# Patient Record
Sex: MALE | Race: WHITE | Employment: OTHER | URBAN - METROPOLITAN AREA
[De-identification: names, ages, dates, MRNs, and addresses within clinical notes are randomized per-mention and may not be internally consistent; named-entity substitution may affect disease eponyms.]

---

## 2017-09-28 ENCOUNTER — HOSPITAL ENCOUNTER (EMERGENCY)
Age: 62
Discharge: HOME | End: 2017-09-28
Attending: EMERGENCY MEDICINE
Payer: COMMERCIAL

## 2017-09-28 ENCOUNTER — APPOINTMENT (OUTPATIENT)
Dept: GENERAL RADIOLOGY | Age: 62
End: 2017-09-28
Attending: NURSE PRACTITIONER
Payer: COMMERCIAL

## 2017-09-28 VITALS
HEART RATE: 95 BPM | RESPIRATION RATE: 26 BRPM | TEMPERATURE: 98.9 F | WEIGHT: 218 LBS | SYSTOLIC BLOOD PRESSURE: 98 MMHG | HEIGHT: 68 IN | OXYGEN SATURATION: 95 % | DIASTOLIC BLOOD PRESSURE: 53 MMHG | BODY MASS INDEX: 33.04 KG/M2

## 2017-09-28 DIAGNOSIS — B34.9 VIRAL SYNDROME: Primary | ICD-10-CM

## 2017-09-28 DIAGNOSIS — M79.10 MYALGIA: ICD-10-CM

## 2017-09-28 LAB
ALBUMIN SERPL-MCNC: 4 G/DL (ref 3.2–4.6)
ALBUMIN/GLOB SERPL: 0.9 {RATIO} (ref 1.2–3.5)
ALP SERPL-CCNC: 92 U/L (ref 50–136)
ALT SERPL-CCNC: 22 U/L (ref 12–65)
ANION GAP SERPL CALC-SCNC: 8 MMOL/L (ref 7–16)
AST SERPL-CCNC: 24 U/L (ref 15–37)
ATRIAL RATE: 101 BPM
BACTERIA URNS QL MICRO: 0 /HPF
BASOPHILS # BLD: 0 K/UL (ref 0–0.2)
BASOPHILS NFR BLD: 0 % (ref 0–2)
BILIRUB SERPL-MCNC: 0.7 MG/DL (ref 0.2–1.1)
BUN SERPL-MCNC: 15 MG/DL (ref 8–23)
CALCIUM SERPL-MCNC: 9.5 MG/DL (ref 8.3–10.4)
CALCULATED P AXIS, ECG09: 47 DEGREES
CALCULATED R AXIS, ECG10: 54 DEGREES
CALCULATED T AXIS, ECG11: 41 DEGREES
CASTS URNS QL MICRO: 0 /LPF
CHLORIDE SERPL-SCNC: 103 MMOL/L (ref 98–107)
CO2 SERPL-SCNC: 29 MMOL/L (ref 21–32)
CREAT SERPL-MCNC: 1.14 MG/DL (ref 0.8–1.5)
DIAGNOSIS, 93000: NORMAL
DIFFERENTIAL METHOD BLD: ABNORMAL
EOSINOPHIL # BLD: 0.5 K/UL (ref 0–0.8)
EOSINOPHIL NFR BLD: 2 % (ref 0.5–7.8)
EPI CELLS #/AREA URNS HPF: NORMAL /HPF
ERYTHROCYTE [DISTWIDTH] IN BLOOD BY AUTOMATED COUNT: 14.2 % (ref 11.9–14.6)
FLUAV AG NPH QL IA: NEGATIVE
FLUBV AG NPH QL IA: NEGATIVE
GLOBULIN SER CALC-MCNC: 4.4 G/DL (ref 2.3–3.5)
GLUCOSE SERPL-MCNC: 100 MG/DL (ref 65–100)
HCT VFR BLD AUTO: 43.1 % (ref 41.1–50.3)
HGB BLD-MCNC: 14 G/DL (ref 13.6–17.2)
IMM GRANULOCYTES # BLD: 0 K/UL (ref 0–0.5)
IMM GRANULOCYTES NFR BLD: 0 % (ref 0–5)
LYMPHOCYTES # BLD: 1.9 K/UL (ref 0.5–4.6)
LYMPHOCYTES NFR BLD: 8 % (ref 13–44)
MCH RBC QN AUTO: 30.5 PG (ref 26.1–32.9)
MCHC RBC AUTO-ENTMCNC: 32.5 G/DL (ref 31.4–35)
MCV RBC AUTO: 93.9 FL (ref 79.6–97.8)
MONOCYTES # BLD: 0 K/UL (ref 0.1–1.3)
MONOCYTES NFR BLD: 0 % (ref 4–12)
NEUTS SEG # BLD: 21.6 K/UL (ref 1.7–8.2)
NEUTS SEG NFR BLD: 90 % (ref 43–78)
P-R INTERVAL, ECG05: 210 MS
PLATELET # BLD AUTO: 252 K/UL (ref 150–450)
PLATELET COMMENTS,PCOM: ADEQUATE
PMV BLD AUTO: 10.2 FL (ref 10.8–14.1)
POTASSIUM SERPL-SCNC: 4.4 MMOL/L (ref 3.5–5.1)
PROT SERPL-MCNC: 8.4 G/DL (ref 6.3–8.2)
Q-T INTERVAL, ECG07: 336 MS
QRS DURATION, ECG06: 76 MS
QTC CALCULATION (BEZET), ECG08: 435 MS
RBC # BLD AUTO: 4.59 M/UL (ref 4.23–5.67)
RBC #/AREA URNS HPF: 0 /HPF
RBC MORPH BLD: ABNORMAL
SODIUM SERPL-SCNC: 140 MMOL/L (ref 136–145)
TROPONIN I SERPL-MCNC: <0.02 NG/ML (ref 0.02–0.05)
VENTRICULAR RATE, ECG03: 101 BPM
WBC # BLD AUTO: 24 K/UL (ref 4.3–11.1)
WBC MORPH BLD: ABNORMAL
WBC URNS QL MICRO: NORMAL /HPF

## 2017-09-28 PROCEDURE — 81015 MICROSCOPIC EXAM OF URINE: CPT | Performed by: NURSE PRACTITIONER

## 2017-09-28 PROCEDURE — 99284 EMERGENCY DEPT VISIT MOD MDM: CPT | Performed by: EMERGENCY MEDICINE

## 2017-09-28 PROCEDURE — 71020 XR CHEST PA LAT: CPT

## 2017-09-28 PROCEDURE — 93005 ELECTROCARDIOGRAM TRACING: CPT | Performed by: NURSE PRACTITIONER

## 2017-09-28 PROCEDURE — 84484 ASSAY OF TROPONIN QUANT: CPT | Performed by: NURSE PRACTITIONER

## 2017-09-28 PROCEDURE — 81003 URINALYSIS AUTO W/O SCOPE: CPT | Performed by: EMERGENCY MEDICINE

## 2017-09-28 PROCEDURE — 85025 COMPLETE CBC W/AUTO DIFF WBC: CPT | Performed by: NURSE PRACTITIONER

## 2017-09-28 PROCEDURE — 87804 INFLUENZA ASSAY W/OPTIC: CPT | Performed by: EMERGENCY MEDICINE

## 2017-09-28 PROCEDURE — 80053 COMPREHEN METABOLIC PANEL: CPT | Performed by: NURSE PRACTITIONER

## 2017-09-28 NOTE — ED PROVIDER NOTES
HPI Comments: 58-year-old white male presents with generalized aching which involves his chest, back, arms onset this morning. Symptoms have resolved at this time. He does report that he has had intermittent chest pain for years. He states the pain usually feels like a muscle cramp. It is not specifically related to activity, eating, breathing or position. Currently he denies chest pain. No shortness of breath. This morning he did have some chills. He has had no measured fever. No nausea or vomiting. Patient is a 58 y.o. male presenting with chest pain. The history is provided by the patient. Chest Pain (Angina)    Associated symptoms include back pain. Pertinent negatives include no abdominal pain, no cough, no fever, no headaches, no nausea, no shortness of breath and no vomiting.         Past Medical History:   Diagnosis Date    Anxiety     takes alprazolam daily    Arthritis     Edema     BLE; takes spironolactone daily    GERD (gastroesophageal reflux disease)     managed w/med    Hypertension     managed w/med    Inguinal hernia     left per pt    PUD (peptic ulcer disease) 40 yrs ago    pt states no current issues    Status post left hip replacement 10/14/2015    [facility] 2002    pt states still limps w/right leg, has problems with speech & problems writing at times       Past Surgical History:   Procedure Laterality Date    HX ADENOIDECTOMY      HX APPENDECTOMY      HX CAROTID ENDARTERECTOMY Bilateral     HX ENDOSCOPY      EGD    HX GI      esophageal dilation    HX ORTHOPAEDIC Right     knee    HX ORTHOPAEDIC Left     thumb; bone removed    HX ORTHOPAEDIC Right     middle finger sx    HX TONSILLECTOMY      HX UROLOGICAL      testicular sx due to injury as a child         Family History:   Problem Relation Age of Onset    Breast Cancer Neg Hx        Social History     Social History    Marital status:      Spouse name: N/A    Number of children: N/A    Years of education: N/A     Occupational History    Not on file. Social History Main Topics    Smoking status: Former Smoker     Packs/day: 1.50     Years: 20.00    Smokeless tobacco: Never Used      Comment: quit smoking in 2002    Alcohol use No      Comment: pt was drinking wine in the evening but stopped last week due to surgery    Drug use: No    Sexual activity: Not on file     Other Topics Concern    Not on file     Social History Narrative         ALLERGIES: Other food    Review of Systems   Constitutional: Positive for chills. Negative for fever. HENT: Negative for congestion. Respiratory: Negative for cough and shortness of breath. Cardiovascular: Positive for chest pain. Gastrointestinal: Negative for abdominal pain, nausea and vomiting. Genitourinary: Negative for dysuria. Musculoskeletal: Positive for back pain and myalgias. Negative for neck pain. Skin: Negative for rash. Neurological: Negative for headaches. Vitals:    09/28/17 0948   BP: 143/52   Pulse: 93   Resp: 26   Temp: 99.3 °F (37.4 °C)   SpO2: 94%   Weight: 98.9 kg (218 lb)   Height: 5' 8\" (1.727 m)            Physical Exam   Constitutional: He is oriented to person, place, and time. He appears well-developed and well-nourished. No distress. HENT:   Head: Normocephalic and atraumatic. Mouth/Throat: Oropharynx is clear and moist.   Eyes: Conjunctivae are normal. Pupils are equal, round, and reactive to light. Neck: Normal range of motion. Neck supple. Cardiovascular: Normal rate and regular rhythm. No murmur heard. Pulmonary/Chest: Effort normal and breath sounds normal. He has no wheezes. He has no rales. He exhibits no tenderness. Abdominal: Soft. There is no tenderness. Musculoskeletal: Normal range of motion. He exhibits no edema. Neurological: He is alert and oriented to person, place, and time. Skin: Skin is warm and dry. Psychiatric: He has a normal mood and affect.    Nursing note and vitals reviewed. MDM  Number of Diagnoses or Management Options  Diagnosis management comments: EKG shows no acute ST changes. Chest x-ray shows no evidence of heart failure or pneumonia. Lab work is unremarkable except for mild leukocytosis 24,000. Urinalysis shows no infection. Reticulocyte is negative. He has a nonsurgical abdominal exam.  Etiology for his chills and leukocytosis is not clear but at this time it appears most likely to be a viral syndrome. No findings to suggest acute coronary syndrome on EKG. Clinically the patient appears very well and does not appear septic. I do not feel antibiotics or further workup or necessary at this time. He has been advised to use Motrin for myalgias and fever. Follow-up with primary care for recheck tomorrow and return if worsening.        Amount and/or Complexity of Data Reviewed  Clinical lab tests: ordered and reviewed  Tests in the radiology section of CPT®: ordered and reviewed  Independent visualization of images, tracings, or specimens: yes    Risk of Complications, Morbidity, and/or Mortality  Presenting problems: moderate  Diagnostic procedures: moderate  Management options: moderate      ED Course       Procedures

## 2017-09-28 NOTE — ED TRIAGE NOTES
Pt states chest pain that comes and goes for the past 14 years. Pt states the pain this morning was like a cramp. Denies any nausea or vomiting.

## 2017-09-28 NOTE — DISCHARGE INSTRUCTIONS
Muscle Aches: Care Instructions  Your Care Instructions  Muscle aches have many possible causes. Some common ones are overuse, tension, and injuries such as a strained muscle. An infection such as the flu can cause muscle aches. Or the aches may be caused by some medicines, such as statins. Muscle aches may also be a symptom of a disease like lupus or fibromyalgia. Myalgia is the medical term for muscle aches. The doctor will do a physical exam and ask questions to try to find what is causing your pain. You may also have tests such as blood tests or imaging tests like X-rays. These can help find or rule out serious problems. The doctor has checked you carefully, but problems can develop later. If you notice any problems or new symptoms, get medical treatment right away. Follow-up care is a key part of your treatment and safety. Be sure to make and go to all appointments, and call your doctor if you are having problems. It's also a good idea to know your test results and keep a list of the medicines you take. How can you care for yourself at home? · Rest the area that hurts. You may need to stop or reduce the activity that causes your symptoms. Then you can return to it slowly. · Put ice or a cold pack on the area for 10 to 20 minutes at a time to ease pain. Put a thin cloth between the ice and your skin. · Take an over-the-counter pain medicine, such as acetaminophen (Tylenol), ibuprofen (Advil, Motrin), or naproxen (Aleve). Be safe with medicines. Read and follow all instructions on the label. When should you call for help? Call your doctor now or seek immediate medical care if:  · Your pain gets worse. · You have new symptoms, such as a fever, swelling, or a rash. Watch closely for changes in your health, and be sure to contact your doctor if:  · You do not get better as expected. Where can you learn more? Go to http://bessy-mimi.info/.   Enter G355 in the search box to learn more about \"Muscle Aches: Care Instructions. \"  Current as of: October 14, 2016  Content Version: 11.3  © 4188-0877 [employer]. Care instructions adapted under license by [employer] (which disclaims liability or warranty for this information). If you have questions about a medical condition or this instruction, always ask your healthcare professional. [address] any warranty or liability for your use of this information. Viral Infections: Care Instructions  Your Care Instructions  You don't feel well, but it's not clear what's causing it. You may have a viral infection. Viruses cause many illnesses, such as the common cold, influenza, fever, rashes, and the diarrhea, nausea, and vomiting that are often called \"stomach flu. \" You may wonder if antibiotic medicines could make you feel better. But antibiotics only treat infections caused by bacteria. They don't work on viruses. The good news is that viral infections usually aren't serious. Most will go away in a few days without medical treatment. In the meantime, there are a few things you can do to make yourself more comfortable. Follow-up care is a key part of your treatment and safety. Be sure to make and go to all appointments, and call your doctor if you are having problems. It's also a good idea to know your test results and keep a list of the medicines you take. How can you care for yourself at home? · Get plenty of rest if you feel tired. · Take an over-the-counter pain medicine if needed, such as acetaminophen (Tylenol), ibuprofen (Advil, Motrin), or naproxen (Aleve). Read and follow all instructions on the label. · Be careful when taking over-the-counter cold or flu medicines and Tylenol at the same time. Many of these medicines have acetaminophen, which is Tylenol. Read the labels to make sure that you are not taking more than the recommended dose.  Too much acetaminophen (Tylenol) can be harmful. · Drink plenty of fluids, enough so that your urine is light yellow or clear like water. If you have kidney, heart, or liver disease and have to limit fluids, talk with your doctor before you increase the amount of fluids you drink. · Stay home from work, school, and other public places while you have a fever. When should you call for help? Call 911 anytime you think you may need emergency care. For example, call if:  · You have severe trouble breathing. · You passed out (lost consciousness). Call your doctor now or seek immediate medical care if:  · You seem to be getting much sicker. · You have a new or higher fever. · You have blood in your stools. · You have new belly pain, or your pain gets worse. · You have a new rash. Watch closely for changes in your health, and be sure to contact your doctor if:  · You start to get better and then get worse. · You do not get better as expected. Where can you learn more? Go to http://bessy-mimi.info/. Enter U062 in the search box to learn more about \"Viral Infections: Care Instructions. \"  Current as of: March 3, 2017  Content Version: 11.3  © 9892-7864 [employer]. Care instructions adapted under license by [employer] (which disclaims liability or warranty for this information). If you have questions about a medical condition or this instruction, always ask your healthcare professional. [place] [address] any warranty or liability for your use of this information.

## 2017-09-28 NOTE — ED TRIAGE NOTES
Micheal Maya is a 58 y.o. male here for chest pain that has been on and off for the past 14 years. Female visitor with patient states patient woke up this morning and had chills and was complaining of chest pain. He states minimal shortness of breath. He denies nausea and vomiting. He is alert and oriented at this time. He is in no acute distress. . Rapid assessment performed. --- Orders were placed. --- Patient will be placed in the waiting room.      Signed By: DEJON Vinson     September 28, 2017

## 2017-11-08 ENCOUNTER — HOSPITAL ENCOUNTER (EMERGENCY)
Age: 62
Discharge: HOME | End: 2017-11-08
Attending: STUDENT IN AN ORGANIZED HEALTH CARE EDUCATION/TRAINING PROGRAM
Payer: COMMERCIAL

## 2017-11-08 VITALS
OXYGEN SATURATION: 98 % | HEIGHT: 68 IN | WEIGHT: 223 LBS | HEART RATE: 69 BPM | BODY MASS INDEX: 33.8 KG/M2 | SYSTOLIC BLOOD PRESSURE: 113 MMHG | DIASTOLIC BLOOD PRESSURE: 56 MMHG | RESPIRATION RATE: 18 BRPM | TEMPERATURE: 98 F

## 2017-11-08 DIAGNOSIS — R19.7 DIARRHEA, UNSPECIFIED TYPE: Primary | ICD-10-CM

## 2017-11-08 LAB
ALBUMIN SERPL-MCNC: 3.6 G/DL (ref 3.2–4.6)
ALBUMIN/GLOB SERPL: 0.7 {RATIO} (ref 1.2–3.5)
ALP SERPL-CCNC: 89 U/L (ref 50–136)
ALT SERPL-CCNC: 24 U/L (ref 12–65)
ANION GAP SERPL CALC-SCNC: 10 MMOL/L (ref 7–16)
AST SERPL-CCNC: 38 U/L (ref 15–37)
BACTERIA SPEC CULT: NEGATIVE
BACTERIA SPEC CULT: NORMAL
BASOPHILS # BLD: 0 K/UL (ref 0–0.2)
BASOPHILS NFR BLD: 0 % (ref 0–2)
BILIRUB SERPL-MCNC: 0.6 MG/DL (ref 0.2–1.1)
BUN SERPL-MCNC: 25 MG/DL (ref 8–23)
CALCIUM SERPL-MCNC: 9.2 MG/DL (ref 8.3–10.4)
CHLORIDE SERPL-SCNC: 105 MMOL/L (ref 98–107)
CO2 SERPL-SCNC: 24 MMOL/L (ref 21–32)
CREAT SERPL-MCNC: 1.02 MG/DL (ref 0.8–1.5)
DIFFERENTIAL METHOD BLD: ABNORMAL
EOSINOPHIL # BLD: 0.3 K/UL (ref 0–0.8)
EOSINOPHIL NFR BLD: 2 % (ref 0.5–7.8)
ERYTHROCYTE [DISTWIDTH] IN BLOOD BY AUTOMATED COUNT: 15.6 % (ref 11.9–14.6)
GLOBULIN SER CALC-MCNC: 5.2 G/DL (ref 2.3–3.5)
GLUCOSE SERPL-MCNC: 94 MG/DL (ref 65–100)
HCT VFR BLD AUTO: 44.2 % (ref 41.1–50.3)
HGB BLD-MCNC: 14.4 G/DL (ref 13.6–17.2)
IMM GRANULOCYTES # BLD: 0 K/UL (ref 0–0.5)
IMM GRANULOCYTES NFR BLD: 0 % (ref 0–5)
LYMPHOCYTES # BLD: 1.6 K/UL (ref 0.5–4.6)
LYMPHOCYTES NFR BLD: 15 % (ref 13–44)
MAGNESIUM SERPL-MCNC: 2.1 MG/DL (ref 1.8–2.4)
MCH RBC QN AUTO: 29.3 PG (ref 26.1–32.9)
MCHC RBC AUTO-ENTMCNC: 32.6 G/DL (ref 31.4–35)
MCV RBC AUTO: 89.8 FL (ref 79.6–97.8)
MONOCYTES # BLD: 0.7 K/UL (ref 0.1–1.3)
MONOCYTES NFR BLD: 7 % (ref 4–12)
NEUTS SEG # BLD: 7.8 K/UL (ref 1.7–8.2)
NEUTS SEG NFR BLD: 76 % (ref 43–78)
PLATELET # BLD AUTO: 301 K/UL (ref 150–450)
PMV BLD AUTO: 9.5 FL (ref 10.8–14.1)
POTASSIUM SERPL-SCNC: 4.7 MMOL/L (ref 3.5–5.1)
PROT SERPL-MCNC: 8.8 G/DL (ref 6.3–8.2)
RBC # BLD AUTO: 4.92 M/UL (ref 4.23–5.67)
SERVICE CMNT-IMP: NORMAL
SODIUM SERPL-SCNC: 139 MMOL/L (ref 136–145)
WBC # BLD AUTO: 10.5 K/UL (ref 4.3–11.1)

## 2017-11-08 PROCEDURE — 96360 HYDRATION IV INFUSION INIT: CPT | Performed by: STUDENT IN AN ORGANIZED HEALTH CARE EDUCATION/TRAINING PROGRAM

## 2017-11-08 PROCEDURE — 81003 URINALYSIS AUTO W/O SCOPE: CPT | Performed by: STUDENT IN AN ORGANIZED HEALTH CARE EDUCATION/TRAINING PROGRAM

## 2017-11-08 PROCEDURE — 80053 COMPREHEN METABOLIC PANEL: CPT | Performed by: STUDENT IN AN ORGANIZED HEALTH CARE EDUCATION/TRAINING PROGRAM

## 2017-11-08 PROCEDURE — 85025 COMPLETE CBC W/AUTO DIFF WBC: CPT | Performed by: STUDENT IN AN ORGANIZED HEALTH CARE EDUCATION/TRAINING PROGRAM

## 2017-11-08 PROCEDURE — 99284 EMERGENCY DEPT VISIT MOD MDM: CPT | Performed by: STUDENT IN AN ORGANIZED HEALTH CARE EDUCATION/TRAINING PROGRAM

## 2017-11-08 PROCEDURE — 87493 C DIFF AMPLIFIED PROBE: CPT | Performed by: STUDENT IN AN ORGANIZED HEALTH CARE EDUCATION/TRAINING PROGRAM

## 2017-11-08 PROCEDURE — 74011250636 HC RX REV CODE- 250/636: Performed by: STUDENT IN AN ORGANIZED HEALTH CARE EDUCATION/TRAINING PROGRAM

## 2017-11-08 PROCEDURE — 83735 ASSAY OF MAGNESIUM: CPT | Performed by: STUDENT IN AN ORGANIZED HEALTH CARE EDUCATION/TRAINING PROGRAM

## 2017-11-08 RX ORDER — LOPERAMIDE HCL 2 MG
2 TABLET ORAL
Qty: 15 TAB | Refills: 0 | Status: SHIPPED | OUTPATIENT
Start: 2017-11-08 | End: 2017-11-18

## 2017-11-08 RX ADMIN — SODIUM CHLORIDE 1000 ML: 900 INJECTION, SOLUTION INTRAVENOUS at 11:13

## 2017-11-08 NOTE — ED PROVIDER NOTES
HPI Comments: 60-year-old male patient presents with reports of 2 days of loose watery diarrhea. Patient reports multiple episodes of loose stool daily for approximate 48 hours. Of note patient suffers from chronic lower extremity pain for which he was prescribed several different narcotic pain medications in the past.  He is struggled for a long time with chronic constipation as a result. He was taking laxatives prior to the onset of his symptoms and his spouse at bedside states she feels he may have taken extra doses inadvertently. In addition however, the patient was recently treated with oral antibiotics for a cough. He finished these antibiotics 2 days prior to the onset of his symptoms. He describes loose, runny stool without evidence of black or bloody stool. Patient denies any quantified fever, chills, chest pain, shortness of breath, significant abdominal pain or distention  Or changes in bladder habits. Patient is a 58 y.o. male presenting with diarrhea. The history is provided by the patient and the spouse. Diarrhea    This is a new problem. The current episode started more than 2 days ago. The problem occurs constantly. The problem has not changed since onset. The pain is located in the generalized abdominal region. The quality of the pain is aching and dull. The pain is mild. Associated symptoms include diarrhea. Pertinent negatives include no anorexia, no fever, no belching, no flatus, no hematochezia, no melena, no nausea, no vomiting, no constipation, no dysuria, no frequency, no hematuria, no headaches, no arthralgias, no myalgias, no trauma, no chest pain, no testicular pain and no back pain.         Past Medical History:   Diagnosis Date    Anxiety     takes alprazolam daily    Arthritis     Edema     BLE; takes spironolactone daily    GERD (gastroesophageal reflux disease)     managed w/med    Hypertension     managed w/med    Inguinal hernia     left per pt    PUD (peptic ulcer disease) 40 yrs ago    pt states no current issues    Status post left hip replacement 10/14/2015    Stroke [facility]) 2002    pt states still limps w/right leg, has problems with speech & problems writing at times       Past Surgical History:   Procedure Laterality Date    HX ADENOIDECTOMY      HX APPENDECTOMY      HX CAROTID ENDARTERECTOMY Bilateral     HX ENDOSCOPY      EGD    HX GI      esophageal dilation    HX ORTHOPAEDIC Right     knee    HX ORTHOPAEDIC Left     thumb; bone removed    HX ORTHOPAEDIC Right     middle finger sx    HX TONSILLECTOMY      HX UROLOGICAL      testicular sx due to injury as a child         Family History:   Problem Relation Age of Onset    Breast Cancer Neg Hx        Social History     Social History    Marital status:      Spouse name: N/A    Number of children: N/A    Years of education: N/A     Occupational History    Not on file. Social History Main Topics    Smoking status: Former Smoker     Packs/day: 1.50     Years: 20.00    Smokeless tobacco: Never Used      Comment: quit smoking in 2002    Alcohol use No      Comment: pt was drinking wine in the evening but stopped last week due to surgery    Drug use: No    Sexual activity: Not on file     Other Topics Concern    Not on file     Social History Narrative         ALLERGIES: Other food    Review of Systems   Constitutional: Negative for chills, diaphoresis and fever. HENT: Negative for congestion, sneezing and sore throat. Eyes: Negative for visual disturbance. Respiratory: Negative for cough, chest tightness, shortness of breath and wheezing. Cardiovascular: Negative for chest pain and leg swelling. Gastrointestinal: Positive for diarrhea. Negative for abdominal pain, anorexia, blood in stool, constipation, flatus, hematochezia, melena, nausea and vomiting. Endocrine: Negative for polyuria.    Genitourinary: Negative for difficulty urinating, dysuria, flank pain, frequency, hematuria, testicular pain and urgency. Musculoskeletal: Negative for arthralgias, back pain, myalgias, neck pain and neck stiffness. Skin: Negative for color change and rash. Neurological: Negative for dizziness, syncope, speech difficulty, weakness, light-headedness, numbness and headaches. Psychiatric/Behavioral: Negative for behavioral problems. All other systems reviewed and are negative. Vitals:    11/08/17 0856 11/08/17 0922 11/08/17 1113 11/08/17 1207   BP: 114/67 126/60 134/70 113/56   Pulse: 78 70 69 69   Resp: 18 20 18 18   Temp: 97.5 °F (36.4 °C)   98 °F (36.7 °C)   SpO2: 98% 96% 98% 98%   Weight: 101.2 kg (223 lb)      Height: 5' 8\" (1.727 m)               Physical Exam   Constitutional: He is oriented to person, place, and time. He appears well-developed and well-nourished. No distress. Alert and oriented to person, place and time. No acute distress. Speaks in slurred though fluent sentences. This is baseline for patient secondary to previous stroke. HENT:   Head: Normocephalic and atraumatic. Nose: Nose normal.   Eyes: Conjunctivae and EOM are normal. Pupils are equal, round, and reactive to light. Neck: Normal range of motion. Neck supple. No JVD present. No tracheal deviation present. Cardiovascular: Normal rate, regular rhythm, S1 normal, S2 normal, normal heart sounds and intact distal pulses. Exam reveals no gallop, no distant heart sounds and no friction rub. No murmur heard. Pulmonary/Chest: Effort normal and breath sounds normal. No accessory muscle usage or stridor. No tachypnea and no bradypnea. No respiratory distress. He has no decreased breath sounds. He has no wheezes. He has no rhonchi. He has no rales. He exhibits no tenderness. Abdominal: Soft. Normal appearance. He exhibits no distension and no mass. There is no hepatosplenomegaly, splenomegaly or hepatomegaly. There is no tenderness.  There is no rigidity, no rebound, no guarding, no CVA tenderness, no tenderness at McBurney's point and negative Gómez's sign. Soft, nontender, no distention. Normal bowel sounds throughout. Musculoskeletal: Normal range of motion. He exhibits no edema, tenderness or deformity. Neurological: He is alert and oriented to person, place, and time. No cranial nerve deficit. Skin: Skin is warm and dry. No rash noted. He is not diaphoretic. Psychiatric: He has a normal mood and affect. His behavior is normal.   Nursing note and vitals reviewed. MDM  Number of Diagnoses or Management Options  Diarrhea, unspecified type: new and requires workup  Diagnosis management comments: Patient's laboratory evaluation is unremarkable,patient's abdominal exam is unremarkable, normal bowel sounds are present and there is no significant distention or pain, I do not feel the patient requires imaging at this time. Patient's C. Difficile screen is negative today. We will treat patient symptomatically and advise close outpatient follow-up and continued by mouth hydration. Advised patient and family to return if symptoms worsen or he is unable to tolerate his medications at home.        Amount and/or Complexity of Data Reviewed  Clinical lab tests: ordered and reviewed  Tests in the radiology section of CPT®: ordered and reviewed  Tests in the medicine section of CPT®: ordered and reviewed  Independent visualization of images, tracings, or specimens: yes    Risk of Complications, Morbidity, and/or Mortality  Presenting problems: moderate  Diagnostic procedures: low  Management options: moderate    Patient Progress  Patient progress: stable    ED Course       Procedures

## 2017-11-08 NOTE — ED TRIAGE NOTES
Pt arrived ambulatory via POV with spouse and stated diarrhea X2 days, pt started imodium last night. Pt has been on a laxative X3 months d/t new pain management medications.

## 2017-11-08 NOTE — ED NOTES
I have reviewed discharge instructions with the patient. The patient verbalized understanding. Patient left ED via Discharge Method: ambulatory to Home with wife). Opportunity for questions and clarification provided. Patient given 1 scripts.

## 2017-11-13 PROBLEM — G89.4 CHRONIC PAIN SYNDROME: Status: ACTIVE | Noted: 2017-11-13

## 2017-12-13 PROBLEM — G89.4 CHRONIC PAIN SYNDROME: Chronic | Status: ACTIVE | Noted: 2017-11-13

## 2018-04-24 PROBLEM — E66.01 SEVERE OBESITY (BMI 35.0-39.9) WITH COMORBIDITY (HCC): Status: ACTIVE | Noted: 2018-04-24

## 2018-04-24 PROBLEM — M48.061 SPINAL STENOSIS OF LUMBAR REGION: Status: ACTIVE | Noted: 2018-04-24

## 2018-04-24 PROBLEM — M48.061 SPINAL STENOSIS OF LUMBAR REGION: Chronic | Status: ACTIVE | Noted: 2018-04-24

## 2018-05-04 ENCOUNTER — HOSPITAL ENCOUNTER (OUTPATIENT)
Dept: MRI IMAGING | Age: 63
Discharge: HOME | End: 2018-05-04
Attending: FAMILY MEDICINE
Payer: COMMERCIAL

## 2018-05-04 DIAGNOSIS — M48.061 SPINAL STENOSIS OF LUMBAR REGION, UNSPECIFIED WHETHER NEUROGENIC CLAUDICATION PRESENT: ICD-10-CM

## 2018-05-04 PROCEDURE — 72148 MRI LUMBAR SPINE W/O DYE: CPT

## 2018-05-07 NOTE — PROGRESS NOTES
MRI Lumbar spine: Heriated disc at L5-S1 with severe central canal stenosis and foraminal narrowing. L4-5 moderate-severe spinal stenosis. Incidental avascular necrosis right femoral head. Will make referral to neurosurgery.   [email or web address]

## 2018-05-07 NOTE — PROGRESS NOTES
Notify patient. MRI shows herniated disc that may be causing patient's pain. Will make referral to neurosurgery. Order has been placed.   [email or web address]

## 2018-05-22 PROBLEM — M48.062 LUMBAR STENOSIS WITH NEUROGENIC CLAUDICATION: Chronic | Status: ACTIVE | Noted: 2018-04-24

## 2018-05-24 ENCOUNTER — HOSPITAL ENCOUNTER (OUTPATIENT)
Dept: SURGERY | Age: 63
Discharge: HOME | End: 2018-05-24
Payer: COMMERCIAL

## 2018-05-24 VITALS
DIASTOLIC BLOOD PRESSURE: 67 MMHG | HEIGHT: 68 IN | TEMPERATURE: 97.7 F | SYSTOLIC BLOOD PRESSURE: 121 MMHG | BODY MASS INDEX: 35.46 KG/M2 | OXYGEN SATURATION: 97 % | RESPIRATION RATE: 18 BRPM | WEIGHT: 234 LBS | HEART RATE: 67 BPM

## 2018-05-24 LAB
ANION GAP SERPL CALC-SCNC: 6 MMOL/L (ref 7–16)
APPEARANCE UR: CLEAR
BACTERIA SPEC CULT: NORMAL
BASOPHILS # BLD: 0.1 K/UL (ref 0–0.2)
BASOPHILS NFR BLD: 1 % (ref 0–2)
BILIRUB UR QL: NEGATIVE
BUN SERPL-MCNC: 51 MG/DL (ref 8–23)
CALCIUM SERPL-MCNC: 9.8 MG/DL (ref 8.3–10.4)
CHLORIDE SERPL-SCNC: 104 MMOL/L (ref 98–107)
CO2 SERPL-SCNC: 30 MMOL/L (ref 21–32)
COLOR UR: YELLOW
CREAT SERPL-MCNC: 1.81 MG/DL (ref 0.8–1.5)
DIFFERENTIAL METHOD BLD: ABNORMAL
EOSINOPHIL # BLD: 0.6 K/UL (ref 0–0.8)
EOSINOPHIL NFR BLD: 6 % (ref 0.5–7.8)
ERYTHROCYTE [DISTWIDTH] IN BLOOD BY AUTOMATED COUNT: 14.5 % (ref 11.9–14.6)
GLUCOSE SERPL-MCNC: 89 MG/DL (ref 65–100)
GLUCOSE UR STRIP.AUTO-MCNC: NEGATIVE MG/DL
HCT VFR BLD AUTO: 39.2 % (ref 41.1–50.3)
HGB BLD-MCNC: 13.1 G/DL (ref 13.6–17.2)
HGB UR QL STRIP: NEGATIVE
IMM GRANULOCYTES # BLD: 0.1 K/UL (ref 0–0.5)
IMM GRANULOCYTES NFR BLD AUTO: 0 % (ref 0–5)
KETONES UR QL STRIP.AUTO: NEGATIVE MG/DL
LEUKOCYTE ESTERASE UR QL STRIP.AUTO: NEGATIVE
LYMPHOCYTES # BLD: 3.7 K/UL (ref 0.5–4.6)
LYMPHOCYTES NFR BLD: 33 % (ref 13–44)
MCH RBC QN AUTO: 31.9 PG (ref 26.1–32.9)
MCHC RBC AUTO-ENTMCNC: 33.4 G/DL (ref 31.4–35)
MCV RBC AUTO: 95.4 FL (ref 79.6–97.8)
MONOCYTES # BLD: 0.5 K/UL (ref 0.1–1.3)
MONOCYTES NFR BLD: 5 % (ref 4–12)
NEUTS SEG # BLD: 6.3 K/UL (ref 1.7–8.2)
NEUTS SEG NFR BLD: 55 % (ref 43–78)
NITRITE UR QL STRIP.AUTO: NEGATIVE
PH UR STRIP: 5 [PH] (ref 5–9)
PLATELET # BLD AUTO: 244 K/UL (ref 150–450)
PMV BLD AUTO: 9.9 FL (ref 10.8–14.1)
POTASSIUM SERPL-SCNC: 4.4 MMOL/L (ref 3.5–5.1)
PROT UR STRIP-MCNC: NEGATIVE MG/DL
RBC # BLD AUTO: 4.11 M/UL (ref 4.23–5.67)
SERVICE CMNT-IMP: NORMAL
SODIUM SERPL-SCNC: 140 MMOL/L (ref 136–145)
SP GR UR REFRACTOMETRY: 1.02 (ref 1–1.02)
UROBILINOGEN UR QL STRIP.AUTO: 0.2 EU/DL (ref 0.2–1)
WBC # BLD AUTO: 11.2 K/UL (ref 4.3–11.1)

## 2018-05-24 PROCEDURE — 87641 MR-STAPH DNA AMP PROBE: CPT | Performed by: NEUROLOGICAL SURGERY

## 2018-05-24 PROCEDURE — 80048 BASIC METABOLIC PNL TOTAL CA: CPT | Performed by: NEUROLOGICAL SURGERY

## 2018-05-24 PROCEDURE — 85025 COMPLETE CBC W/AUTO DIFF WBC: CPT | Performed by: NEUROLOGICAL SURGERY

## 2018-05-24 PROCEDURE — 81003 URINALYSIS AUTO W/O SCOPE: CPT | Performed by: NEUROLOGICAL SURGERY

## 2018-05-24 NOTE — PERIOP NOTES
Patient verified name, , and surgery as listed in [facility]. Patient provided medical/health information and PTA medications to the best of their ability. TYPE  CASE: 2  Orders per surgeon: yes received and dated yes  Labs per surgeon: cbc,bmp,urine mrsa swab. Results: -  Labs per anesthesia protocol: none. Results -  EKG:  na     Nasal Swab collected per MD order and instructions for Mupirocin nasal ointment if required. Patient provided with and instructed on education handouts including Guide to Surgery, blood transfusions, pain management, and hand hygiene for the family and community, and [facility] brochure. Road to Recovery Spine surgery patient guide given. Instructed on incentive spirometry with return demonstration. Long handled prehab sponge given with instructions for use. Patient viewed spine prehab video. Hibiclens and instructions given per hospital policy. Instructed patient to continue previous medications as prescribed prior to surgery unless otherwise directed and to take the following medications the day of surgery according to anesthesia guidelines : xanax,atorvastatin,hydrocodone as needed,metoprolol,prilosec . Instructed patient to hold  the following medications on the day of surgery: multivitamin,naprosyn. Original medication prescription bottles none visualized during patient appointment. Patient teach back successful and patient demonstrates knowledge of instruction.

## 2018-05-25 NOTE — PERIOP NOTES
Labs noted. Dr. Radha Jo has reviewed labs. Reviewed BMP results with Dr. Milton Freire. Per Dr. Milton Freire, have patient come a little earlier on DOS and repeat BMP. Order entered into [facility], signed and held. Notified Ana Major in Main preop. Note placed on front of chart.

## 2018-05-29 ENCOUNTER — ANESTHESIA EVENT (OUTPATIENT)
Dept: SURGERY | Age: 63
End: 2018-05-29
Payer: COMMERCIAL

## 2018-05-30 ENCOUNTER — ANESTHESIA (OUTPATIENT)
Dept: SURGERY | Age: 63
End: 2018-05-30
Payer: COMMERCIAL

## 2018-05-30 ENCOUNTER — HOSPITAL ENCOUNTER (OUTPATIENT)
Age: 63
Setting detail: OUTPATIENT SURGERY
Discharge: HOME | End: 2018-05-30
Attending: NEUROLOGICAL SURGERY | Admitting: NEUROLOGICAL SURGERY
Payer: COMMERCIAL

## 2018-05-30 ENCOUNTER — APPOINTMENT (OUTPATIENT)
Dept: GENERAL RADIOLOGY | Age: 63
End: 2018-05-30
Attending: NEUROLOGICAL SURGERY
Payer: COMMERCIAL

## 2018-05-30 VITALS
HEIGHT: 68 IN | TEMPERATURE: 97.9 F | RESPIRATION RATE: 16 BRPM | HEART RATE: 63 BPM | OXYGEN SATURATION: 100 % | BODY MASS INDEX: 35.68 KG/M2 | WEIGHT: 235.4 LBS | SYSTOLIC BLOOD PRESSURE: 100 MMHG | DIASTOLIC BLOOD PRESSURE: 55 MMHG

## 2018-05-30 DIAGNOSIS — M48.062 LUMBAR STENOSIS WITH NEUROGENIC CLAUDICATION: Primary | Chronic | ICD-10-CM

## 2018-05-30 LAB
ANION GAP SERPL CALC-SCNC: 13 MMOL/L (ref 7–16)
BUN SERPL-MCNC: 45 MG/DL (ref 8–23)
CALCIUM SERPL-MCNC: 9.6 MG/DL (ref 8.3–10.4)
CHLORIDE SERPL-SCNC: 104 MMOL/L (ref 98–107)
CO2 SERPL-SCNC: 28 MMOL/L (ref 21–32)
CREAT SERPL-MCNC: 1.4 MG/DL (ref 0.8–1.5)
GLUCOSE SERPL-MCNC: 97 MG/DL (ref 65–100)
POTASSIUM SERPL-SCNC: 4.1 MMOL/L (ref 3.5–5.1)
SODIUM SERPL-SCNC: 145 MMOL/L (ref 136–145)

## 2018-05-30 PROCEDURE — 74011250636 HC RX REV CODE- 250/636

## 2018-05-30 PROCEDURE — 77030013951 HC SEAL TISS ADH DURASL KT INLC -G1: Performed by: NEUROLOGICAL SURGERY

## 2018-05-30 PROCEDURE — 74011250637 HC RX REV CODE- 250/637: Performed by: ANESTHESIOLOGY

## 2018-05-30 PROCEDURE — 77030020782 HC GWN BAIR PAWS FLX 3M -B: Performed by: ANESTHESIOLOGY

## 2018-05-30 PROCEDURE — 76010000162 HC OR TIME 1.5 TO 2 HR INTENSV-TIER 1: Performed by: NEUROLOGICAL SURGERY

## 2018-05-30 PROCEDURE — 76060000034 HC ANESTHESIA 1.5 TO 2 HR: Performed by: NEUROLOGICAL SURGERY

## 2018-05-30 PROCEDURE — 77030014647 HC SEAL FBRN TISSL BAXT -D: Performed by: NEUROLOGICAL SURGERY

## 2018-05-30 PROCEDURE — 74011000272 HC RX REV CODE- 272: Performed by: NEUROLOGICAL SURGERY

## 2018-05-30 PROCEDURE — 80048 BASIC METABOLIC PNL TOTAL CA: CPT | Performed by: ANESTHESIOLOGY

## 2018-05-30 PROCEDURE — 77030019908 HC STETH ESOPH SIMS -A: Performed by: ANESTHESIOLOGY

## 2018-05-30 PROCEDURE — 77030011640 HC PAD GRND REM COVD -A: Performed by: NEUROLOGICAL SURGERY

## 2018-05-30 PROCEDURE — 76210000006 HC OR PH I REC 0.5 TO 1 HR: Performed by: NEUROLOGICAL SURGERY

## 2018-05-30 PROCEDURE — 77030003029 HC SUT VCRL J&J -B: Performed by: NEUROLOGICAL SURGERY

## 2018-05-30 PROCEDURE — 77030029372 HC ADH SKN CLSR PRINEO J&J -C: Performed by: NEUROLOGICAL SURGERY

## 2018-05-30 PROCEDURE — 77030019940 HC BLNKT HYPOTHRM STRY -B: Performed by: ANESTHESIOLOGY

## 2018-05-30 PROCEDURE — 74011250636 HC RX REV CODE- 250/636: Performed by: NEUROLOGICAL SURGERY

## 2018-05-30 PROCEDURE — 77030032490 HC SLV COMPR SCD KNE COVD -B: Performed by: NEUROLOGICAL SURGERY

## 2018-05-30 PROCEDURE — 77030030163 HC BN WAX J&J -A: Performed by: NEUROLOGICAL SURGERY

## 2018-05-30 PROCEDURE — 77030012935 HC DRSG AQUACEL BMS -B: Performed by: NEUROLOGICAL SURGERY

## 2018-05-30 PROCEDURE — 76210000022 HC REC RM PH II 1.5 TO 2 HR: Performed by: NEUROLOGICAL SURGERY

## 2018-05-30 PROCEDURE — 74011000250 HC RX REV CODE- 250

## 2018-05-30 PROCEDURE — 77030008477 HC STYL SATN SLP COVD -A: Performed by: ANESTHESIOLOGY

## 2018-05-30 PROCEDURE — 74011000250 HC RX REV CODE- 250: Performed by: NEUROLOGICAL SURGERY

## 2018-05-30 PROCEDURE — 74011250636 HC RX REV CODE- 250/636: Performed by: ANESTHESIOLOGY

## 2018-05-30 PROCEDURE — 77030018390 HC SPNG HEMSTAT2 J&J -B: Performed by: NEUROLOGICAL SURGERY

## 2018-05-30 PROCEDURE — 77030019557 HC ELECTRD VES SEAL MEDT -F: Performed by: NEUROLOGICAL SURGERY

## 2018-05-30 PROCEDURE — 77030008703 HC TU ET UNCUF COVD -A: Performed by: ANESTHESIOLOGY

## 2018-05-30 PROCEDURE — 77030002916 HC SUT ETHLN J&J -A: Performed by: NEUROLOGICAL SURGERY

## 2018-05-30 PROCEDURE — 72020 X-RAY EXAM OF SPINE 1 VIEW: CPT

## 2018-05-30 PROCEDURE — 77030018836 HC SOL IRR NACL ICUM -A: Performed by: NEUROLOGICAL SURGERY

## 2018-05-30 RX ORDER — HYDROMORPHONE HYDROCHLORIDE 2 MG/ML
0.5 INJECTION, SOLUTION INTRAMUSCULAR; INTRAVENOUS; SUBCUTANEOUS
Status: DISCONTINUED | OUTPATIENT
Start: 2018-05-30 | End: 2018-05-30 | Stop reason: HOSPADM

## 2018-05-30 RX ORDER — PROPOFOL 10 MG/ML
INJECTION, EMULSION INTRAVENOUS AS NEEDED
Status: DISCONTINUED | OUTPATIENT
Start: 2018-05-30 | End: 2018-05-30 | Stop reason: HOSPADM

## 2018-05-30 RX ORDER — GLYCOPYRROLATE 0.2 MG/ML
INJECTION INTRAMUSCULAR; INTRAVENOUS AS NEEDED
Status: DISCONTINUED | OUTPATIENT
Start: 2018-05-30 | End: 2018-05-30 | Stop reason: HOSPADM

## 2018-05-30 RX ORDER — ACETAMINOPHEN 500 MG
1000 TABLET ORAL ONCE
Status: COMPLETED | OUTPATIENT
Start: 2018-05-30 | End: 2018-05-30

## 2018-05-30 RX ORDER — LIDOCAINE HYDROCHLORIDE 10 MG/ML
0.1 INJECTION INFILTRATION; PERINEURAL AS NEEDED
Status: DISCONTINUED | OUTPATIENT
Start: 2018-05-30 | End: 2018-05-30 | Stop reason: HOSPADM

## 2018-05-30 RX ORDER — DEXAMETHASONE SODIUM PHOSPHATE 4 MG/ML
INJECTION, SOLUTION INTRA-ARTICULAR; INTRALESIONAL; INTRAMUSCULAR; INTRAVENOUS; SOFT TISSUE AS NEEDED
Status: DISCONTINUED | OUTPATIENT
Start: 2018-05-30 | End: 2018-05-30 | Stop reason: HOSPADM

## 2018-05-30 RX ORDER — CEFAZOLIN SODIUM/WATER 2 G/20 ML
2 SYRINGE (ML) INTRAVENOUS
Status: COMPLETED | OUTPATIENT
Start: 2018-05-30 | End: 2018-05-30

## 2018-05-30 RX ORDER — LIDOCAINE HYDROCHLORIDE 20 MG/ML
INJECTION, SOLUTION EPIDURAL; INFILTRATION; INTRACAUDAL; PERINEURAL AS NEEDED
Status: DISCONTINUED | OUTPATIENT
Start: 2018-05-30 | End: 2018-05-30 | Stop reason: HOSPADM

## 2018-05-30 RX ORDER — FLUMAZENIL 0.1 MG/ML
0.2 INJECTION INTRAVENOUS
Status: DISCONTINUED | OUTPATIENT
Start: 2018-05-30 | End: 2018-05-30 | Stop reason: HOSPADM

## 2018-05-30 RX ORDER — FENTANYL CITRATE 50 UG/ML
INJECTION, SOLUTION INTRAMUSCULAR; INTRAVENOUS AS NEEDED
Status: DISCONTINUED | OUTPATIENT
Start: 2018-05-30 | End: 2018-05-30 | Stop reason: HOSPADM

## 2018-05-30 RX ORDER — SODIUM CHLORIDE 0.9 % (FLUSH) 0.9 %
5-10 SYRINGE (ML) INJECTION AS NEEDED
Status: DISCONTINUED | OUTPATIENT
Start: 2018-05-30 | End: 2018-05-30 | Stop reason: HOSPADM

## 2018-05-30 RX ORDER — EPHEDRINE SULFATE 50 MG/ML
INJECTION, SOLUTION INTRAVENOUS AS NEEDED
Status: DISCONTINUED | OUTPATIENT
Start: 2018-05-30 | End: 2018-05-30 | Stop reason: HOSPADM

## 2018-05-30 RX ORDER — ONDANSETRON 2 MG/ML
INJECTION INTRAMUSCULAR; INTRAVENOUS AS NEEDED
Status: DISCONTINUED | OUTPATIENT
Start: 2018-05-30 | End: 2018-05-30 | Stop reason: HOSPADM

## 2018-05-30 RX ORDER — OXYCODONE HYDROCHLORIDE 5 MG/1
5 TABLET ORAL
Status: COMPLETED | OUTPATIENT
Start: 2018-05-30 | End: 2018-05-30

## 2018-05-30 RX ORDER — SODIUM CHLORIDE, SODIUM LACTATE, POTASSIUM CHLORIDE, CALCIUM CHLORIDE 600; 310; 30; 20 MG/100ML; MG/100ML; MG/100ML; MG/100ML
100 INJECTION, SOLUTION INTRAVENOUS CONTINUOUS
Status: DISCONTINUED | OUTPATIENT
Start: 2018-05-30 | End: 2018-05-30 | Stop reason: HOSPADM

## 2018-05-30 RX ORDER — NALBUPHINE HYDROCHLORIDE 20 MG/ML
5 INJECTION, SOLUTION INTRAMUSCULAR; INTRAVENOUS; SUBCUTANEOUS
Status: DISCONTINUED | OUTPATIENT
Start: 2018-05-30 | End: 2018-05-30 | Stop reason: HOSPADM

## 2018-05-30 RX ORDER — SODIUM CHLORIDE, SODIUM LACTATE, POTASSIUM CHLORIDE, CALCIUM CHLORIDE 600; 310; 30; 20 MG/100ML; MG/100ML; MG/100ML; MG/100ML
75 INJECTION, SOLUTION INTRAVENOUS CONTINUOUS
Status: DISCONTINUED | OUTPATIENT
Start: 2018-05-30 | End: 2018-05-30 | Stop reason: HOSPADM

## 2018-05-30 RX ORDER — NALOXONE HYDROCHLORIDE 0.4 MG/ML
0.1 INJECTION, SOLUTION INTRAMUSCULAR; INTRAVENOUS; SUBCUTANEOUS
Status: DISCONTINUED | OUTPATIENT
Start: 2018-05-30 | End: 2018-05-30 | Stop reason: HOSPADM

## 2018-05-30 RX ORDER — SODIUM CHLORIDE 0.9 % (FLUSH) 0.9 %
5-10 SYRINGE (ML) INJECTION EVERY 8 HOURS
Status: DISCONTINUED | OUTPATIENT
Start: 2018-05-30 | End: 2018-05-30 | Stop reason: HOSPADM

## 2018-05-30 RX ORDER — BUPIVACAINE HYDROCHLORIDE AND EPINEPHRINE 2.5; 5 MG/ML; UG/ML
INJECTION, SOLUTION EPIDURAL; INFILTRATION; INTRACAUDAL; PERINEURAL AS NEEDED
Status: DISCONTINUED | OUTPATIENT
Start: 2018-05-30 | End: 2018-05-30 | Stop reason: HOSPADM

## 2018-05-30 RX ORDER — MIDAZOLAM HYDROCHLORIDE 1 MG/ML
2 INJECTION, SOLUTION INTRAMUSCULAR; INTRAVENOUS
Status: DISCONTINUED | OUTPATIENT
Start: 2018-05-30 | End: 2018-05-30 | Stop reason: HOSPADM

## 2018-05-30 RX ORDER — NEOSTIGMINE METHYLSULFATE 1 MG/ML
INJECTION INTRAVENOUS AS NEEDED
Status: DISCONTINUED | OUTPATIENT
Start: 2018-05-30 | End: 2018-05-30 | Stop reason: HOSPADM

## 2018-05-30 RX ORDER — OXYCODONE AND ACETAMINOPHEN 7.5; 325 MG/1; MG/1
1 TABLET ORAL
Qty: 21 TAB | Refills: 0 | Status: SHIPPED | OUTPATIENT
Start: 2018-05-30 | End: 2018-06-08

## 2018-05-30 RX ORDER — ROCURONIUM BROMIDE 10 MG/ML
INJECTION, SOLUTION INTRAVENOUS AS NEEDED
Status: DISCONTINUED | OUTPATIENT
Start: 2018-05-30 | End: 2018-05-30 | Stop reason: HOSPADM

## 2018-05-30 RX ADMIN — ROCURONIUM BROMIDE 10 MG: 10 INJECTION, SOLUTION INTRAVENOUS at 10:33

## 2018-05-30 RX ADMIN — ACETAMINOPHEN 1000 MG: 500 TABLET, FILM COATED ORAL at 09:20

## 2018-05-30 RX ADMIN — LIDOCAINE HYDROCHLORIDE 60 MG: 20 INJECTION, SOLUTION EPIDURAL; INFILTRATION; INTRACAUDAL; PERINEURAL at 09:54

## 2018-05-30 RX ADMIN — Medication 2 G: at 09:49

## 2018-05-30 RX ADMIN — ONDANSETRON 4 MG: 2 INJECTION INTRAMUSCULAR; INTRAVENOUS at 10:35

## 2018-05-30 RX ADMIN — EPHEDRINE SULFATE 10 MG: 50 INJECTION, SOLUTION INTRAVENOUS at 10:55

## 2018-05-30 RX ADMIN — ROCURONIUM BROMIDE 40 MG: 10 INJECTION, SOLUTION INTRAVENOUS at 09:54

## 2018-05-30 RX ADMIN — EPHEDRINE SULFATE 5 MG: 50 INJECTION, SOLUTION INTRAVENOUS at 09:56

## 2018-05-30 RX ADMIN — SODIUM CHLORIDE, SODIUM LACTATE, POTASSIUM CHLORIDE, AND CALCIUM CHLORIDE 100 ML/HR: 600; 310; 30; 20 INJECTION, SOLUTION INTRAVENOUS at 09:20

## 2018-05-30 RX ADMIN — FENTANYL CITRATE 100 MCG: 50 INJECTION, SOLUTION INTRAMUSCULAR; INTRAVENOUS at 09:54

## 2018-05-30 RX ADMIN — SODIUM CHLORIDE, SODIUM LACTATE, POTASSIUM CHLORIDE, AND CALCIUM CHLORIDE: 600; 310; 30; 20 INJECTION, SOLUTION INTRAVENOUS at 10:43

## 2018-05-30 RX ADMIN — PROPOFOL 200 MG: 10 INJECTION, EMULSION INTRAVENOUS at 09:54

## 2018-05-30 RX ADMIN — OXYCODONE HYDROCHLORIDE 5 MG: 5 TABLET ORAL at 12:20

## 2018-05-30 RX ADMIN — NEOSTIGMINE METHYLSULFATE 3 MG: 1 INJECTION INTRAVENOUS at 11:33

## 2018-05-30 RX ADMIN — GLYCOPYRROLATE 0.4 MG: 0.2 INJECTION INTRAMUSCULAR; INTRAVENOUS at 11:33

## 2018-05-30 RX ADMIN — DEXAMETHASONE SODIUM PHOSPHATE 10 MG: 4 INJECTION, SOLUTION INTRA-ARTICULAR; INTRALESIONAL; INTRAMUSCULAR; INTRAVENOUS; SOFT TISSUE at 10:35

## 2018-05-30 NOTE — PERIOP NOTES
Patient's family identified by name and access code and given update with surgical start time. THAIS Vivas @ [id number].

## 2018-05-30 NOTE — ANESTHESIA POSTPROCEDURE EVALUATION
Post-Anesthesia Evaluation and Assessment    Patient: Fabiano Vaca MRN: [id number]  SSN: [id number]    YOB: 1955  Age: 58 y.o. Sex: male       Cardiovascular Function/Vital Signs  Visit Vitals    /55    Pulse 63    Temp 36.6 °C (97.9 °F)    Resp 16    Ht 5' 8\" (1.727 m)    Wt 106.8 kg (235 lb 6.4 oz)    SpO2 100%    BMI 35.79 kg/m2       Patient is status post general anesthesia for Procedure(s):  BILATERAL LAMINECTOMY AND FACECTOMY L4-S1. Nausea/Vomiting: None    Postoperative hydration reviewed and adequate. Pain:  Pain Scale 1: Numeric (0 - 10) (05/30/18 1220)  Pain Intensity 1: 6 (05/30/18 1220)   Managed    Neurological Status:   Neuro (WDL): Within Defined Limits (05/30/18 1220)  Neuro  Neurologic State: Alert (05/30/18 1220)  Orientation Level: Oriented X4 (05/30/18 1220)  Cognition: Follows commands (05/30/18 1220)  LUE Motor Response: Purposeful (05/30/18 1220)  LLE Motor Response: Purposeful (05/30/18 1220)  RUE Motor Response: Purposeful (05/30/18 1220)  RLE Motor Response: Purposeful (05/30/18 1220)   At baseline    Mental Status and Level of Consciousness: Arousable    Pulmonary Status:   O2 Device: Room air (05/30/18 1220)   Adequate oxygenation and airway patent    Complications related to anesthesia: None    Post-anesthesia assessment completed.  No concerns    Signed By: Stephen Shepherd MD     May 30, 2018

## 2018-05-30 NOTE — IP AVS SNAPSHOT
[address] [address] 
[phone] Patient: Shirley Puentes MRN: [id number] KEYSHA:[id number] About your hospitalization You were admitted on:  May 30, 2018 You last received care in the:  [facility] PACU You were discharged on:  May 30, 2018 Why you were hospitalized Your primary diagnosis was:  Not on File Follow-up Information Follow up With Details Comments Contact Info Zakiya Quiros MD Go on 6/13/2018 Follow up appointment at 9:30 am [address] [facility] [address] 
[phone] Your Scheduled Appointments Wednesday June 13, 2018  9:30 AM EDT  
WOUND CHECK with [facility] NURSE  
[facility] ([facility]) [address] [address] [place] [address] 40  
[phone] Wednesday June 20, 2018 10:00 AM EDT Extended Office Visit with Libertad Rg MD  
[facility] ([facility] [address]  
[phone] Discharge Orders None A check jordy indicates which time of day the medication should be taken. My Medications START taking these medications Instructions Each Dose to Equal  
 Morning Noon Evening Bedtime  
 oxyCODONE-acetaminophen 7.5-325 mg per tablet Commonly known as:  PERCOCET 7.5 Your last dose was: Your next dose is: Take 1 Tab by mouth every eight (8) hours as needed for Pain. Max Daily Amount: 3 Tabs. 1 Tab CHANGE how you take these medications Instructions Each Dose to Equal  
 Morning Noon Evening Bedtime  
 atorvastatin 80 mg tablet Commonly known as:  LIPITOR What changed:   
- how much to take 
- how to take this - when to take this 
- additional instructions Your last dose was: Your next dose is:    
   
   
 1 every day for cholesterol  Indications: mixed hyperlipidemia  
     
   
   
   
  
 furosemide 40 mg tablet Commonly known as:  LASIX What changed:   
- how much to take 
- how to take this - when to take this 
- additional instructions Your last dose was: Your next dose is:    
   
   
 1 every day for fluid  
     
   
   
   
  
 lubiPROStone 8 mcg capsule Commonly known as:  Nohemi Saab What changed:   
- how much to take 
- how to take this - when to take this 
- additional instructions Your last dose was: Your next dose is:    
   
   
 1 qam for opioid induced constipation. If another product preferred on formulary please let me know from which alternatives to select. naproxen 500 mg tablet Commonly known as:  NAPROSYN What changed:   
- how much to take 
- how to take this - when to take this 
- additional instructions Your last dose was: Your next dose is:    
   
   
 1 tid for pain  Indications: Pain CONTINUE taking these medications Instructions Each Dose to Equal  
 Morning Noon Evening Bedtime ALPRAZolam 2 mg tablet Commonly known as:  Lukas Thomson Your last dose was: Your next dose is:    
   
   
 1/2 to 1 qid prn anxiety  Indications: anxiety  
     
   
   
   
  
 fluocinoNIDE 0.05 % topical cream  
Commonly known as:  LIDEX Your last dose was: Your next dose is:    
   
   
 Apply  to affected area two (2) times a day. HYDROcodone-acetaminophen 5-325 mg per tablet Commonly known as:  Carlo Plum Your last dose was: Your next dose is:    
   
   
 1 q6h prn severe pain  Indications: Pain  
     
   
   
   
  
 metoprolol succinate 200 mg XL tablet Commonly known as:  TOPROL-XL Your last dose was: Your next dose is:    
   
   
 1 every day for BP  Indications: hypertension multivitamin tablet Commonly known as:  ONE A DAY Your last dose was: Your next dose is: Take 1 Tab by mouth daily. 1 Tab Omeprazole delayed release 20 mg tablet Commonly known as:  PRILOSEC D/R Your last dose was: Your next dose is:    
   
   
 1 bid for stomach acid  
     
   
   
   
  
 valsartan-hydroCHLOROthiazide 320-25 mg per tablet Commonly known as:  DIOVAN-HCT Your last dose was: Your next dose is:    
   
   
 1 every day for BP Where to Get Your Medications Information on where to get these meds will be given to you by the nurse or doctor. ! Ask your nurse or doctor about these medications  
  oxyCODONE-acetaminophen 7.5-325 mg per tablet Opioid Education Prescription Opioids: What You Need to Know: 
 
Prescription opioids can be used to help relieve moderate-to-severe pain and are often prescribed following a surgery or injury, or for certain health conditions. These medications can be an important part of treatment but also come with serious risks. Opioids are strong pain medicines. Examples include hydrocodone, oxycodone, fentanyl, and morphine. Heroin is an example of an illegal opioid. It is important to work with your health care provider to make sure you are getting the safest, most effective care. WHAT ARE THE RISKS AND SIDE EFFECTS OF OPIOID USE? Prescription opioids carry serious risks of addiction and overdose, especially with prolonged use. An opioid overdose, often marked by slow breathing, can cause sudden death. The use of prescription opioids can have a number of side effects as well, even when taken as directed. · Tolerance-meaning you might need to take more of a medication for the same pain relief · Physical dependence-meaning you have symptoms of withdrawal when the medication is stopped. Withdrawal symptoms can include nausea, sweating, chills, diarrhea, stomach cramps, and muscle aches. Withdrawal can last up to several weeks, depending on which drug you took and how long you took it. · Increased sensitivity to pain · Constipation · Nausea, vomiting, and dry mouth · Sleepiness and dizziness · Confusion · Depression · Low levels of testosterone that can result in lower sex drive, energy, and strength · Itching and sweating RISKS ARE GREATER WITH:      
· History of drug misuse, substance use disorder, or overdose · Mental health conditions (such as depression or anxiety) · Sleep apnea · Older age (72 years or older) · Pregnancy Avoid alcohol while taking prescription opioids. Also, unless specifically advised by your health care provider, medications to avoid include: · Benzodiazepines (such as Xanax or Valium) · Muscle relaxants (such as Soma or Flexeril) · Hypnotics (such as Ambien or Lunesta) · Other prescription opioids KNOW YOUR OPTIONS Talk to your health care provider about ways to manage your pain that don't involve prescription opioids. Some of these options may actually work better and have fewer risks and side effects. Options may include: 
· Pain relievers such as acetaminophen, ibuprofen, and naproxen · Some medications that are also used for depression or seizures · Physical therapy and exercise · Counseling to help patients learn how to cope better with triggers of pain and stress. · Application of heat or cold compress · Massage therapy · Relaxation techniques Be Informed Make sure you know the name of your medication, how much and how often to take it, and its potential risks & side effects. IF YOU ARE PRESCRIBED OPIOIDS FOR PAIN: 
· Never take opioids in greater amounts or more often than prescribed. Remember the goal is not to be pain-free but to manage your pain at a tolerable level. · Follow up with your primary care provider to: · Work together to create a plan on how to manage your pain. · Talk about ways to help manage your pain that don't involve prescription opioids. · Talk about any and all concerns and side effects. · Help prevent misuse and abuse. · Never sell or share prescription opioids · Help prevent misuse and abuse. · Store prescription opioids in a secure place and out of reach of others (this may include visitors, children, friends, and family). · Safely dispose of unused/unwanted prescription opioids: Find your community drug take-back program or your pharmacy mail-back program, or flush them down the toilet, following guidance from the Food and Drug Administration (www.fda.gov/Drugs/ResourcesForYou). · Visit www.cdc.gov/drugoverdose to learn about the risks of opioid abuse and overdose. · If you believe you may be struggling with addiction, tell your health care provider and ask for guidance or call 1EQ at 0-160-303-AOTT. Discharge Instructions [facility] 
[address] 
[phone] Postoperative Home Instructions Lumbar (Back) Surgery · Showering: You may shower the first day you are home with the dressing on. If the dressing becomes saturated, change it completely to a similar dressing. Leave the steri-strips or glue in place. After 3 days, you may take the dressing off and leave it off. If you have the brown aquacel dressing, leave it alone for 5 days and then you may remove the dressing. Do not soak in a tub, and use only soap and water on the wound. · Wound Care: A small to moderate amount of reddish drainage on the dressing is normal the first 1-2 days after surgery. If the dressing becomes saturated, change it.   Once the steri-strips begin to peel up on their own, you may gently take them off. Large amounts of clear, watery drainage is not normal and you should call our office immediately. · Signs of Infection: Extreme tenderness at the wound, excessive redness and/or swelling, or ugly yellowish-greenish drainage from the wound. Fever greater than 100.5 may be present. If you think you have a wound infection, call our office immediately. · Driving: You may not begin driving until after your visit to our office for a wound and suture check which is normally 7-10 days after you come home from the hospital. 
 
· Medications: You should take anti-inflammatory medications such as Motrin, Celebrex for 30 days after surgery, every day, on a regular schedule only if prescribed by your physician. The pain medicine prescribed may be taken as needed. You should take a stool softener (Colace) twice a day, drink lots of water and eat high fiber foods to avoid constipation (this is a common problem with pain medicine.) · Deep Breathing Exercises: Continue to do your incentive spirometry and/or deep breathing exercises to prevent risk of pneumonia. · Smoking: YOU MAY NOT SMOKE! Smoking will interfere with your healing. If you smoke, you may end up with having another surgery or more problems! · Activity: No heavy lifting for 4 weeks after surgery. This means anything heavier than a coffee cup or newspaper. After 4 weeks, you may gradually begin lifting heavier things. Avoid bending, stooping, or twisting at the waist.  Do not lie on your stomach to sleep. · You may remove your [place] hose when consistently walking. You may do steps and inclines in moderation. · Sexual Relations: You may resume sexual relations 2 weeks after your surgery. · Walking Program: You should begin walking every day on the first day after surgery.   Start for short distances, then go a little farther each day.  You should eventually walk 1-2 miles every day for the long term. This is very important in your recovery period because walking strengthens the spinal muscles and will help protect your disc and vertebrae. · Symptoms after Surgery: Dont be alarmed if you still have some symptoms after surgery. The nerves often require time to heal after the pressure has been taken off. Be patient, you should see improvement with time. · Follow-up: You will need to call our office for an appointment to see a nurse one week after surgery for a wound check. An appointment will be made then for you to see your surgeon about 4 weeks after surgery. Please call our office if you have any other questions or problems. Listen to your body; it will tell you if you are overdoing it. Use common sense and take care of yourself! After general anesthesia or intravenous sedation, for 24 hours or while taking prescription Narcotics: · Limit your activities · Do not drive and operate hazardous machinery · Do not make important personal or business decisions · Do  not drink alcoholic beverages · If you have not urinated within 8 hours after discharge, please contact your surgeon on call. *  Please give a list of your current medications to your Primary Care Provider. *  Please update this list whenever your medications are discontinued, doses are 
    changed, or new medications (including over-the-counter products) are added. *  Please carry medication information at all times in case of emergency situations. These are general instructions for a healthy lifestyle: No smoking/ No tobacco products/ Avoid exposure to second hand smoke Surgeon General's Warning:  Quitting smoking now greatly reduces serious risk to your health. Obesity, smoking, and sedentary lifestyle greatly increases your risk for illness A healthy diet, regular physical exercise & weight monitoring are important for maintaining a healthy lifestyle Recognize signs and symptoms of STROKE: 
F-face looks uneven A-arms unable to move or move unevenly S-speech slurred or non-existent T-time-call 911 as soon as signs and symptoms begin-DO NOT go Back to bed or wait to see if you get better-TIME IS BRAIN. Introducing [facility]! [facility] introduces Onformonics patient portal. Now you can access parts of your medical record, email your doctor's office, and request medication refills online. 1. In your internet browser, go to [email or web address] 2. Click on the First Time User? Click Here link in the Sign In box. You will see the New Member Sign Up page. 3. Enter your Onformonics Access Code exactly as it appears below. You will not need to use this code after youve completed the sign-up process. If you do not sign up before the expiration date, you must request a new code. · Onform[employer]ics Access Code: [email or web address] Expires: 7/1/2018  4:09 PM 
 
4. Enter the last four digits of your Social Security Number (xxxx) and Date of Birth (mm/dd/yyyy) as indicated and click Submit. You will be taken to the next sign-up page. 5. Create a Onformonics ID. This will be your Onformonics login ID and cannot be changed, so think of one that is secure and easy to remember. 6. Create a Onformonics password. You can change your password at any time. 7. Enter your Password Reset Question and Answer. This can be used at a later time if you forget your password. 8. Enter your e-mail address. You will receive e-mail notification when new information is available in [address] 9. Click Sign Up. You can now view and download portions of your medical record. 10. Click the Download Summary menu link to download a portable copy of your medical information. If you have questions, please visit the Frequently Asked Questions section of the Onformonics website.  Remember, Onformonics is NOT to be used for urgent needs. For medical emergencies, dial 911. Now available from your iPhone and Android! Introducing Lauro Gardiner As a [facility] patient, I wanted to make you aware of our electronic visit tool called Lauro Gardiner. [employer] allows you to connect within minutes with a medical provider 24 hours a day, seven days a week via a mobile device or tablet or logging into a secure website from your computer. You can access Lauro Gardiner from anywhere in the [place]. A virtual visit might be right for you when you have a simple condition and feel like you just dont want to get out of bed, or cant get away from work for an appointment, when your regular [facility] provider is not available (evenings, weekends or holidays), or when youre out of town and need minor care. Electronic visits cost only $49 and if the Joyhound/Capse[employer] provider determines a prescription is needed to treat your condition, one can be electronically transmitted to a nearby pharmacy*. Please take a moment to enroll today if you have not already done so. The enrollment process is free and takes just a few minutes. To enroll, please download the Joyhound/Capseo mary to your tablet or phone, or visit [email or web address] to enroll on your computer. And, as an [address] [place] [address] patient with a [employer] account, the results of your visits will be scanned into your electronic medical record and your primary care provider will be able to view the scanned results. We urge you to continue to see your regular [facility] provider for your ongoing medical care. And while your primary care provider may not be the one available when you seek a Lauro Gardiner virtual visit, the peace of mind you get from getting a real diagnosis real time can be priceless. For more information on Lauro Gardiner, view our Frequently Asked Questions (FAQs) at [email or web address].  
 
Sincerely, 
 
 Lion Corley MD 
Chief Medical Officer [address] *:  certain medications cannot be prescribed via Lauro Gardiner Providers Seen During Your Hospitalization Provider Specialty Primary office phone Ashwin Aguirre MD Neurosurgery [phone] Your Primary Care Physician (PCP) Primary Care Physician Office Phone Office Fax Cori Patino [phone] You are allergic to the following Allergen Reactions Other Food Rash Beets allergy. Recent Documentation Height Weight BMI Smoking Status 1.727 m 106.8 kg 35.79 kg/m2 Former Smoker Emergency Contacts Name Discharge Info Relation Home Work Mobile Collette Cunas  Spouse [3] [phone] Patient Belongings The following personal items are in your possession at time of discharge: 
  Dental Appliances: Lowers, Uppers         Home Medications: None   Jewelry: Other (comment) (instructed patient to remove all jewelry, informed patient hospital is not responsible for personal items, patient understands and gives wife all jewelry)  Clothing: Shirt, Pants, Footwear, Undergarments    Other Valuables: None Please provide this summary of care documentation to your next provider. Signatures-by signing, you are acknowledging that this After Visit Summary has been reviewed with you and you have received a copy. Patient Signature:  ____________________________________________________________ Date:  ____________________________________________________________  
  
[facility] Provider Signature:  ____________________________________________________________ Date:  ____________________________________________________________

## 2018-05-30 NOTE — OP NOTES
[facility] REPORT    Patricia Lopez  MR#: [id number]  : 1955  ACCOUNT #: [de-identified]   DATE OF SERVICE: 2018    PREOPERATIVE DIAGNOSIS:  Lumbar stenosis and neurogenic claudication L4 through S1.    POSTOPERATIVE DIAGNOSIS:  Lumbar stenosis and neurogenic claudication L4 through S1.    OPERATION/PROCEDURES:  Bilateral laminectomy, medial facetectomy, L4-5, L5-S1. SURGEON:  Delphine Lezama MD    FIRST ASSISTANT:  None. ANESTHESIA:  General endotracheal.    ESTIMATED BLOOD LOSS:  Minimal.    PREPARATION:  ChloraPrep. COMPLICATIONS:  None. SPECIMENS REMOVED:  None. IMPLANTS:  None. HISTORY OF PRESENT ILLNESS:  A 57-year-old man with neurogenic claudication refractory to conservative measures. MRI was positive for severe spinal stenosis L4 through S1 with calcified disks ventrally and significant lamina thickening and ligamentous thickening posteriorly. The patient was admitted for surgery as conservative measures have failed. OPERATIVE NOTE:  The patient was brought to the operating room, carefully placed under general endotracheal anesthesia without complications, carefully turned prone on the Cloward frame and the posterior aspect of the back was shaved and prepped in the usual sterile fashion. An incision was made from the top of L4 through the bottom of S1 in the midline. The muscles were stripped in the bilateral subperiosteal plane with cautery and elevators and deep retractors were placed. Lateral lumbar spine x-ray confirmed the instrument correctly pointing at the L4-5 disk space. Next, laminectomy and facetectomy were carried out with Leksell rongeurs and 3 and 4 mm Kerrison rongeurs. Significant bony ligamentous hypertrophy were present throughout. Bone edges were waxed.   After completion of the decompression, a ball-tip probe was passed along the nerve roots rostrally and caudally bilaterally without further compression. The wound was irrigated copiously until clear. DuraSeal was placed for epidural hemostasis and dural protection. FloSeal was placed. The wound was dry, no further bleeding was noted, and the wound was closed. The fascia was closed tightly with interrupted 0 Vicryl. 0.5% Marcaine with epinephrine was used to infiltrate the muscle for postoperative analgesia. Subcutaneous tissues were closed with interrupted 3-0 Vicryl. Skin was closed with running locked 3-0 nylon suture and sterile dressings were placed. The patient tolerated the procedure well and was turned supine, awakened, extubated, and taken to PACU in stable condition. There were no obvious complications. DISCHARGE INSTRUCTIONS:  DIET:  Regular. ACTIVITY:  Up as tolerated. No heavy lifting, bending or automobile driving. Showers are permissible but no baths. The patient will contact the physician if he develops any fevers, drainage, swelling, cellulitis or neurological change. Return visit in 10-14 days for suture removal.    DISCHARGE MEDICATIONS:  Included admission medicines plus Percocet 7.5/325 every 8  hours p.r.n. DISCHARGE CONDITION:  Satisfactory.       MD NURIA Dean / RN  D: 05/30/2018 11:29     T: 05/30/2018 11:53  JOB #: [id number]

## 2018-05-30 NOTE — BRIEF OP NOTE
BRIEF OPERATIVE NOTE    Date of Procedure: 5/30/2018   Preoperative Diagnosis: Bulging lumbar disc [M51.26]  Herniated nucleus pulposus, L5-S1 [M51.27]  Lumbar stenosis with neurogenic claudication [M48.062]  Postoperative Diagnosis: Bulging lumbar disc [M51.26]  Herniated nucleus pulposus, L5-S1 [M51.27]  Lumbar stenosis with neurogenic claudication [M48.062]    Procedure(s):  BILATERAL LAMINECTOMY AND FACECTOMY L4-S1  Surgeon(s) and Role:     * Rodrigue Kilgore MD - Primary         Surgical Assistant: NONE    Surgical Staff:  Circ-1: Manasa Trinidad RN  Radiology Technician: Victorina Bradford RT, R, CT  Scrub Tech-1: Grisel Villarreal  Scrub Private/Assistant: Preethi Hall Time In   Incision Start 1016   Incision Close      Anesthesia: General   Estimated Blood Loss: MINIMAL  Specimens: * No specimens in log *   Findings: STENOSIS   Complications: NONE  Implants: * No implants in log *

## 2018-06-19 ENCOUNTER — HOME HEALTH ADMISSION (OUTPATIENT)
Dept: HOME HEALTH SERVICES | Facility: HOME HEALTH | Age: 63
End: 2018-06-19

## 2018-06-19 ENCOUNTER — HOSPITAL ENCOUNTER (OUTPATIENT)
Dept: LAB | Age: 63
Discharge: HOME | End: 2018-06-19
Payer: COMMERCIAL

## 2018-06-19 DIAGNOSIS — M48.062 LUMBAR STENOSIS WITH NEUROGENIC CLAUDICATION: ICD-10-CM

## 2018-06-19 PROCEDURE — 87186 SC STD MICRODIL/AGAR DIL: CPT | Performed by: NEUROLOGICAL SURGERY

## 2018-06-19 PROCEDURE — 87205 SMEAR GRAM STAIN: CPT | Performed by: NEUROLOGICAL SURGERY

## 2018-06-19 PROCEDURE — 87077 CULTURE AEROBIC IDENTIFY: CPT | Performed by: NEUROLOGICAL SURGERY

## 2018-06-20 ENCOUNTER — HOME CARE VISIT (OUTPATIENT)
Dept: SCHEDULING | Facility: HOME HEALTH | Age: 63
End: 2018-06-20

## 2018-06-21 ENCOUNTER — ANESTHESIA EVENT (OUTPATIENT)
Dept: SURGERY | Age: 63
DRG: 857 | End: 2018-06-21
Payer: COMMERCIAL

## 2018-06-21 PROBLEM — T81.49XA POSTOPERATIVE WOUND INFECTION: Status: ACTIVE | Noted: 2018-06-21

## 2018-06-22 ENCOUNTER — HOSPITAL ENCOUNTER (INPATIENT)
Age: 63
LOS: 5 days | Discharge: HOME HEALTH | DRG: 857 | End: 2018-06-27
Attending: NEUROLOGICAL SURGERY | Admitting: NEUROLOGICAL SURGERY
Payer: COMMERCIAL

## 2018-06-22 ENCOUNTER — ANESTHESIA (OUTPATIENT)
Dept: SURGERY | Age: 63
DRG: 857 | End: 2018-06-22
Payer: COMMERCIAL

## 2018-06-22 LAB
BACTERIA SPEC CULT: ABNORMAL
BACTERIA SPEC CULT: ABNORMAL
GRAM STN SPEC: ABNORMAL
GRAM STN SPEC: ABNORMAL
SERVICE CMNT-IMP: ABNORMAL

## 2018-06-22 PROCEDURE — 87077 CULTURE AEROBIC IDENTIFY: CPT | Performed by: NEUROLOGICAL SURGERY

## 2018-06-22 PROCEDURE — 87186 SC STD MICRODIL/AGAR DIL: CPT | Performed by: NEUROLOGICAL SURGERY

## 2018-06-22 PROCEDURE — 74011250636 HC RX REV CODE- 250/636

## 2018-06-22 PROCEDURE — 76060000032 HC ANESTHESIA 0.5 TO 1 HR: Performed by: NEUROLOGICAL SURGERY

## 2018-06-22 PROCEDURE — 77030018390 HC SPNG HEMSTAT2 J&J -B: Performed by: NEUROLOGICAL SURGERY

## 2018-06-22 PROCEDURE — 76210000016 HC OR PH I REC 1 TO 1.5 HR: Performed by: NEUROLOGICAL SURGERY

## 2018-06-22 PROCEDURE — 77030019940 HC BLNKT HYPOTHRM STRY -B: Performed by: NURSE ANESTHETIST, CERTIFIED REGISTERED

## 2018-06-22 PROCEDURE — 77030012894: Performed by: NEUROLOGICAL SURGERY

## 2018-06-22 PROCEDURE — 74011250636 HC RX REV CODE- 250/636: Performed by: NEUROLOGICAL SURGERY

## 2018-06-22 PROCEDURE — 77030003029 HC SUT VCRL J&J -B: Performed by: NEUROLOGICAL SURGERY

## 2018-06-22 PROCEDURE — 0K9F0ZZ DRAINAGE OF RIGHT TRUNK MUSCLE, OPEN APPROACH: ICD-10-PCS | Performed by: NEUROLOGICAL SURGERY

## 2018-06-22 PROCEDURE — 87075 CULTR BACTERIA EXCEPT BLOOD: CPT | Performed by: NEUROLOGICAL SURGERY

## 2018-06-22 PROCEDURE — 74011250636 HC RX REV CODE- 250/636: Performed by: ANESTHESIOLOGY

## 2018-06-22 PROCEDURE — 77030030163 HC BN WAX J&J -A: Performed by: NEUROLOGICAL SURGERY

## 2018-06-22 PROCEDURE — 77030008703 HC TU ET UNCUF COVD -A: Performed by: NURSE ANESTHETIST, CERTIFIED REGISTERED

## 2018-06-22 PROCEDURE — 77030020782 HC GWN BAIR PAWS FLX 3M -B: Performed by: NURSE ANESTHETIST, CERTIFIED REGISTERED

## 2018-06-22 PROCEDURE — 74011000250 HC RX REV CODE- 250

## 2018-06-22 PROCEDURE — 65270000029 HC RM PRIVATE

## 2018-06-22 PROCEDURE — 77030019908 HC STETH ESOPH SIMS -A: Performed by: NURSE ANESTHETIST, CERTIFIED REGISTERED

## 2018-06-22 PROCEDURE — 87070 CULTURE OTHR SPECIMN AEROBIC: CPT | Performed by: NEUROLOGICAL SURGERY

## 2018-06-22 PROCEDURE — 74011250637 HC RX REV CODE- 250/637: Performed by: NEUROLOGICAL SURGERY

## 2018-06-22 PROCEDURE — 77030008477 HC STYL SATN SLP COVD -A: Performed by: NURSE ANESTHETIST, CERTIFIED REGISTERED

## 2018-06-22 PROCEDURE — 76010000160 HC OR TIME 0.5 TO 1 HR INTENSV-TIER 1: Performed by: NEUROLOGICAL SURGERY

## 2018-06-22 RX ORDER — HYDROMORPHONE HYDROCHLORIDE 2 MG/ML
1 INJECTION, SOLUTION INTRAMUSCULAR; INTRAVENOUS; SUBCUTANEOUS
Status: DISCONTINUED | OUTPATIENT
Start: 2018-06-22 | End: 2018-06-27 | Stop reason: HOSPADM

## 2018-06-22 RX ORDER — SODIUM CHLORIDE 0.9 % (FLUSH) 0.9 %
5-10 SYRINGE (ML) INJECTION AS NEEDED
Status: DISCONTINUED | OUTPATIENT
Start: 2018-06-22 | End: 2018-06-27 | Stop reason: HOSPADM

## 2018-06-22 RX ORDER — OXYCODONE AND ACETAMINOPHEN 7.5; 325 MG/1; MG/1
1 TABLET ORAL
Status: DISCONTINUED | OUTPATIENT
Start: 2018-06-22 | End: 2018-06-27 | Stop reason: HOSPADM

## 2018-06-22 RX ORDER — NALOXONE HYDROCHLORIDE 4 MG/.1ML
1 SPRAY NASAL
Status: DISCONTINUED | OUTPATIENT
Start: 2018-06-22 | End: 2018-06-27 | Stop reason: HOSPADM

## 2018-06-22 RX ORDER — NAPROXEN 250 MG/1
500 TABLET ORAL 2 TIMES DAILY WITH MEALS
Status: DISCONTINUED | OUTPATIENT
Start: 2018-06-22 | End: 2018-06-27 | Stop reason: HOSPADM

## 2018-06-22 RX ORDER — SODIUM CHLORIDE, SODIUM LACTATE, POTASSIUM CHLORIDE, CALCIUM CHLORIDE 600; 310; 30; 20 MG/100ML; MG/100ML; MG/100ML; MG/100ML
75 INJECTION, SOLUTION INTRAVENOUS CONTINUOUS
Status: DISCONTINUED | OUTPATIENT
Start: 2018-06-22 | End: 2018-06-27 | Stop reason: HOSPADM

## 2018-06-22 RX ORDER — ROCURONIUM BROMIDE 10 MG/ML
INJECTION, SOLUTION INTRAVENOUS AS NEEDED
Status: DISCONTINUED | OUTPATIENT
Start: 2018-06-22 | End: 2018-06-22 | Stop reason: HOSPADM

## 2018-06-22 RX ORDER — FLUOCINONIDE 0.5 MG/G
CREAM TOPICAL 2 TIMES DAILY
Status: DISCONTINUED | OUTPATIENT
Start: 2018-06-22 | End: 2018-06-23

## 2018-06-22 RX ORDER — CEFAZOLIN SODIUM/WATER 2 G/20 ML
2 SYRINGE (ML) INTRAVENOUS ONCE
Status: COMPLETED | OUTPATIENT
Start: 2018-06-22 | End: 2018-06-22

## 2018-06-22 RX ORDER — ONDANSETRON 2 MG/ML
INJECTION INTRAMUSCULAR; INTRAVENOUS AS NEEDED
Status: DISCONTINUED | OUTPATIENT
Start: 2018-06-22 | End: 2018-06-22 | Stop reason: HOSPADM

## 2018-06-22 RX ORDER — METOPROLOL SUCCINATE 100 MG/1
100 TABLET, EXTENDED RELEASE ORAL DAILY
Status: DISCONTINUED | OUTPATIENT
Start: 2018-06-23 | End: 2018-06-24

## 2018-06-22 RX ORDER — ALPRAZOLAM 0.5 MG/1
2 TABLET ORAL
Status: DISCONTINUED | OUTPATIENT
Start: 2018-06-22 | End: 2018-06-27 | Stop reason: HOSPADM

## 2018-06-22 RX ORDER — EPHEDRINE SULFATE 50 MG/ML
INJECTION, SOLUTION INTRAVENOUS AS NEEDED
Status: DISCONTINUED | OUTPATIENT
Start: 2018-06-22 | End: 2018-06-22 | Stop reason: HOSPADM

## 2018-06-22 RX ORDER — ZOLPIDEM TARTRATE 5 MG/1
10 TABLET ORAL
Status: DISCONTINUED | OUTPATIENT
Start: 2018-06-22 | End: 2018-06-27 | Stop reason: HOSPADM

## 2018-06-22 RX ORDER — LIDOCAINE HYDROCHLORIDE 10 MG/ML
0.3 INJECTION INFILTRATION; PERINEURAL ONCE
Status: ACTIVE | OUTPATIENT
Start: 2018-06-22 | End: 2018-06-22

## 2018-06-22 RX ORDER — CEFAZOLIN SODIUM/WATER 2 G/20 ML
2 SYRINGE (ML) INTRAVENOUS EVERY 8 HOURS
Status: DISCONTINUED | OUTPATIENT
Start: 2018-06-23 | End: 2018-06-23

## 2018-06-22 RX ORDER — HYDROMORPHONE HYDROCHLORIDE 1 MG/ML
0.5 INJECTION, SOLUTION INTRAMUSCULAR; INTRAVENOUS; SUBCUTANEOUS
Status: DISCONTINUED | OUTPATIENT
Start: 2018-06-22 | End: 2018-06-27 | Stop reason: HOSPADM

## 2018-06-22 RX ORDER — OXYCODONE HYDROCHLORIDE 5 MG/1
10 TABLET ORAL
Status: DISCONTINUED | OUTPATIENT
Start: 2018-06-22 | End: 2018-06-22 | Stop reason: HOSPADM

## 2018-06-22 RX ORDER — ACETAMINOPHEN 325 MG/1
650 TABLET ORAL
Status: DISCONTINUED | OUTPATIENT
Start: 2018-06-22 | End: 2018-06-27 | Stop reason: HOSPADM

## 2018-06-22 RX ORDER — SILDENAFIL 100 MG/1
50-100 TABLET, FILM COATED ORAL DAILY PRN
Status: DISCONTINUED | OUTPATIENT
Start: 2018-06-22 | End: 2018-06-27 | Stop reason: HOSPADM

## 2018-06-22 RX ORDER — GLYCOPYRROLATE 0.2 MG/ML
INJECTION INTRAMUSCULAR; INTRAVENOUS AS NEEDED
Status: DISCONTINUED | OUTPATIENT
Start: 2018-06-22 | End: 2018-06-22 | Stop reason: HOSPADM

## 2018-06-22 RX ORDER — FENTANYL CITRATE 50 UG/ML
INJECTION, SOLUTION INTRAMUSCULAR; INTRAVENOUS AS NEEDED
Status: DISCONTINUED | OUTPATIENT
Start: 2018-06-22 | End: 2018-06-22 | Stop reason: HOSPADM

## 2018-06-22 RX ORDER — SODIUM CHLORIDE, SODIUM LACTATE, POTASSIUM CHLORIDE, CALCIUM CHLORIDE 600; 310; 30; 20 MG/100ML; MG/100ML; MG/100ML; MG/100ML
100 INJECTION, SOLUTION INTRAVENOUS CONTINUOUS
Status: DISPENSED | OUTPATIENT
Start: 2018-06-22 | End: 2018-06-23

## 2018-06-22 RX ORDER — SODIUM CHLORIDE 0.9 % (FLUSH) 0.9 %
5-10 SYRINGE (ML) INJECTION EVERY 8 HOURS
Status: DISCONTINUED | OUTPATIENT
Start: 2018-06-22 | End: 2018-06-24

## 2018-06-22 RX ORDER — SODIUM CHLORIDE, SODIUM LACTATE, POTASSIUM CHLORIDE, CALCIUM CHLORIDE 600; 310; 30; 20 MG/100ML; MG/100ML; MG/100ML; MG/100ML
100 INJECTION, SOLUTION INTRAVENOUS CONTINUOUS
Status: DISCONTINUED | OUTPATIENT
Start: 2018-06-22 | End: 2018-06-22 | Stop reason: HOSPADM

## 2018-06-22 RX ORDER — LIDOCAINE HYDROCHLORIDE 20 MG/ML
INJECTION, SOLUTION EPIDURAL; INFILTRATION; INTRACAUDAL; PERINEURAL AS NEEDED
Status: DISCONTINUED | OUTPATIENT
Start: 2018-06-22 | End: 2018-06-22 | Stop reason: HOSPADM

## 2018-06-22 RX ORDER — FUROSEMIDE 40 MG/1
40 TABLET ORAL DAILY
Status: DISCONTINUED | OUTPATIENT
Start: 2018-06-23 | End: 2018-06-27 | Stop reason: HOSPADM

## 2018-06-22 RX ORDER — ATORVASTATIN CALCIUM 40 MG/1
80 TABLET, FILM COATED ORAL DAILY
Status: DISCONTINUED | OUTPATIENT
Start: 2018-06-23 | End: 2018-06-27 | Stop reason: HOSPADM

## 2018-06-22 RX ORDER — NEOSTIGMINE METHYLSULFATE 1 MG/ML
INJECTION INTRAVENOUS AS NEEDED
Status: DISCONTINUED | OUTPATIENT
Start: 2018-06-22 | End: 2018-06-22 | Stop reason: HOSPADM

## 2018-06-22 RX ORDER — PANTOPRAZOLE SODIUM 40 MG/1
40 TABLET, DELAYED RELEASE ORAL
Status: DISCONTINUED | OUTPATIENT
Start: 2018-06-23 | End: 2018-06-27 | Stop reason: HOSPADM

## 2018-06-22 RX ORDER — LUBIPROSTONE 8 UG/1
8 CAPSULE, GELATIN COATED ORAL
Status: DISCONTINUED | OUTPATIENT
Start: 2018-06-23 | End: 2018-06-27 | Stop reason: HOSPADM

## 2018-06-22 RX ORDER — MIDAZOLAM HYDROCHLORIDE 1 MG/ML
2 INJECTION, SOLUTION INTRAMUSCULAR; INTRAVENOUS
Status: COMPLETED | OUTPATIENT
Start: 2018-06-22 | End: 2018-06-22

## 2018-06-22 RX ORDER — HYDROMORPHONE HYDROCHLORIDE 2 MG/ML
0.5 INJECTION, SOLUTION INTRAMUSCULAR; INTRAVENOUS; SUBCUTANEOUS
Status: DISCONTINUED | OUTPATIENT
Start: 2018-06-22 | End: 2018-06-22 | Stop reason: HOSPADM

## 2018-06-22 RX ORDER — SODIUM CHLORIDE 0.9 % (FLUSH) 0.9 %
5-10 SYRINGE (ML) INJECTION AS NEEDED
Status: DISCONTINUED | OUTPATIENT
Start: 2018-06-22 | End: 2018-06-22 | Stop reason: HOSPADM

## 2018-06-22 RX ORDER — PROPOFOL 10 MG/ML
INJECTION, EMULSION INTRAVENOUS AS NEEDED
Status: DISCONTINUED | OUTPATIENT
Start: 2018-06-22 | End: 2018-06-22 | Stop reason: HOSPADM

## 2018-06-22 RX ORDER — DEXAMETHASONE SODIUM PHOSPHATE 4 MG/ML
INJECTION, SOLUTION INTRA-ARTICULAR; INTRALESIONAL; INTRAMUSCULAR; INTRAVENOUS; SOFT TISSUE AS NEEDED
Status: DISCONTINUED | OUTPATIENT
Start: 2018-06-22 | End: 2018-06-22 | Stop reason: HOSPADM

## 2018-06-22 RX ADMIN — FENTANYL CITRATE 100 MCG: 50 INJECTION, SOLUTION INTRAMUSCULAR; INTRAVENOUS at 15:40

## 2018-06-22 RX ADMIN — DEXAMETHASONE SODIUM PHOSPHATE 10 MG: 4 INJECTION, SOLUTION INTRA-ARTICULAR; INTRALESIONAL; INTRAMUSCULAR; INTRAVENOUS; SOFT TISSUE at 15:55

## 2018-06-22 RX ADMIN — HYDROMORPHONE HYDROCHLORIDE 0.5 MG: 1 INJECTION, SOLUTION INTRAMUSCULAR; INTRAVENOUS; SUBCUTANEOUS at 14:30

## 2018-06-22 RX ADMIN — ONDANSETRON 4 MG: 2 INJECTION INTRAMUSCULAR; INTRAVENOUS at 16:10

## 2018-06-22 RX ADMIN — LIDOCAINE HYDROCHLORIDE 100 MG: 20 INJECTION, SOLUTION EPIDURAL; INFILTRATION; INTRACAUDAL; PERINEURAL at 15:45

## 2018-06-22 RX ADMIN — SODIUM CHLORIDE, SODIUM LACTATE, POTASSIUM CHLORIDE, AND CALCIUM CHLORIDE 75 ML/HR: 600; 310; 30; 20 INJECTION, SOLUTION INTRAVENOUS at 18:22

## 2018-06-22 RX ADMIN — ROCURONIUM BROMIDE 30 MG: 10 INJECTION, SOLUTION INTRAVENOUS at 15:45

## 2018-06-22 RX ADMIN — ALPRAZOLAM 2 MG: 0.5 TABLET ORAL at 19:29

## 2018-06-22 RX ADMIN — MIDAZOLAM HYDROCHLORIDE 2 MG: 1 INJECTION, SOLUTION INTRAMUSCULAR; INTRAVENOUS at 15:17

## 2018-06-22 RX ADMIN — GLYCOPYRROLATE 0.2 MG: 0.2 INJECTION INTRAMUSCULAR; INTRAVENOUS at 16:15

## 2018-06-22 RX ADMIN — NEOSTIGMINE METHYLSULFATE 3 MG: 1 INJECTION INTRAVENOUS at 16:15

## 2018-06-22 RX ADMIN — ZOLPIDEM TARTRATE 10 MG: 5 TABLET ORAL at 23:09

## 2018-06-22 RX ADMIN — HYDROMORPHONE HYDROCHLORIDE 1 MG: 2 INJECTION, SOLUTION INTRAMUSCULAR; INTRAVENOUS; SUBCUTANEOUS at 21:11

## 2018-06-22 RX ADMIN — EPHEDRINE SULFATE 10 MG: 50 INJECTION, SOLUTION INTRAVENOUS at 15:56

## 2018-06-22 RX ADMIN — ROCURONIUM BROMIDE 10 MG: 10 INJECTION, SOLUTION INTRAVENOUS at 16:00

## 2018-06-22 RX ADMIN — GLYCOPYRROLATE 0.2 MG: 0.2 INJECTION INTRAMUSCULAR; INTRAVENOUS at 15:40

## 2018-06-22 RX ADMIN — Medication 10 ML: at 22:50

## 2018-06-22 RX ADMIN — Medication 2 G: at 22:49

## 2018-06-22 RX ADMIN — SODIUM CHLORIDE, SODIUM LACTATE, POTASSIUM CHLORIDE, AND CALCIUM CHLORIDE: 600; 310; 30; 20 INJECTION, SOLUTION INTRAVENOUS at 16:10

## 2018-06-22 RX ADMIN — SODIUM CHLORIDE, SODIUM LACTATE, POTASSIUM CHLORIDE, AND CALCIUM CHLORIDE 100 ML/HR: 600; 310; 30; 20 INJECTION, SOLUTION INTRAVENOUS at 11:47

## 2018-06-22 RX ADMIN — PROPOFOL 150 MG: 10 INJECTION, EMULSION INTRAVENOUS at 15:45

## 2018-06-22 RX ADMIN — Medication 10 ML: at 21:11

## 2018-06-22 RX ADMIN — Medication 2 G: at 15:55

## 2018-06-22 NOTE — PROGRESS NOTES
06/22/18 1800   Dual Skin Pressure Injury Assessment   Dual Skin Pressure Injury Assessment WDL   Second Care Provider (Based on [address] [place] [address]) [facility] RN       Incision lower back from wound debridement

## 2018-06-22 NOTE — BRIEF OP NOTE
BRIEF OPERATIVE NOTE    Date of Procedure: 6/22/2018   Preoperative Diagnosis: Postoperative infection, subsequent encounter [T81. 4XXD]  Postoperative Diagnosis: * No post-op diagnosis entered *    Procedure(s):  INCISION AND DRAINAGE OF LUMBAR WOUND  Surgeon(s) and Role:     * Valarie Dukes MD - Primary         Surgical Assistant: NONE    Surgical Staff:  Circ-1: John Boston RN  Scrub Tech-1: Travis Hernandez  Scrub Private/Assistant: Duong Martin  Event Time In   Incision Start 1600   Incision Close      Anesthesia: General   Estimated Blood Loss: MINIMAL  Specimens:   ID Type Source Tests Collected by Time Destination   1 : lumbar wound  Wound Lumbar CULTURE, ANAEROBIC, CULTURE, WOUND W Vivianne Dakin, GRAM STAIN Valarie Dukes MD 6/22/2018 1602 Microbiology      Findings: INFECTION     Complications: NONE  Implants: * No implants in log *

## 2018-06-22 NOTE — PERIOP NOTES
TRANSFER - OUT REPORT:    Verbal report given to [place] on [facility]  being transferred to  for routine post - op       Report consisted of patients Situation, Background, Assessment and   Recommendations(SBAR). Information from the following report(s) SBAR, OR Summary, Procedure Summary, Intake/Output and Cardiac Rhythm NSR was reviewed with the receiving nurse. Lines:   Peripheral IV 06/22/18 Left Hand (Active)   Site Assessment Clean, dry, & intact 6/22/2018 11:49 AM   Phlebitis Assessment 0 6/22/2018 11:49 AM   Infiltration Assessment 0 6/22/2018 11:49 AM   Dressing Status Clean, dry, & intact 6/22/2018 11:49 AM   Dressing Type Transparent 6/22/2018 11:49 AM   Hub Color/Line Status Infusing 6/22/2018 11:49 AM        Opportunity for questions and clarification was provided. Patient transported with:   Tech    VTE prophylaxis orders have been written for [address] [place] [address]. Patient and family given floor number and nurses name. Family updated re: pt status after security code verified.

## 2018-06-22 NOTE — IP AVS SNAPSHOT
[facility] 
 
 
 [address] [place] [address] 
[phone] Patient: Sharon Bernheim MRN: [id number] AUSTIN:[birth year] About your hospitalization You were admitted on:  June 22, 2018 You last received care in the:  [facility] 7 MED SURG You were discharged on:  June 27, 2018 Why you were hospitalized Your primary diagnosis was:  Not on File Follow-up Information Follow up With Details Comments Contact Info [address]  They will contact you within 48 hours of discharge to schedule initial visit. [address] [facility] [address] [place] [id number] 
[phone] Adams Soler MD Call As needed [address] [facility] [address] 
[phone] Tanya Ureña MD On 7/31/2018 11:15 am [facility] [place] [address] 
[phone] Your Scheduled Appointments Thursday June 28, 2018 11:45 AM EDT Follow Up with [facility] ([facility]) [address] [place] [address] 40  
[phone] Thursday July 05, 2018  8:45 AM EDT Follow Up with MD JENNIFER [facility] ([address] [facility]) [address] [employer], [place] [address] [facility] 40  
[phone] Thursday July 05, 2018  4:00 PM EDT Office Visit with Adams Soler MD  
[facility] ([address]) [address] [place] [address]  
[phone] Tuesday July 31, 2018 11:15 AM EDT Follow Up with MD JENNIFER [facility] ([address] [facility]) [address] [place] [address] 740 [facility] 40  
[phone] Discharge Orders None A check jordy indicates which time of day the medication should be taken. My Medications START taking these medications Instructions Each Dose to Equal  
 Morning Noon Evening Bedtime  
 oxyCODONE-acetaminophen 7.5-325 mg per tablet Commonly known as:  PERCOCET 7.5 Your last dose was:  6/27 at 5:26 a.m. Your next dose is:  1:26 p.m. Take 1 Tab by mouth every eight (8) hours as needed for Pain. Max Daily Amount: 3 Tabs. 1 Tab CONTINUE taking these medications Instructions Each Dose to Equal  
 Morning Noon Evening Bedtime ALPRAZolam 2 mg tablet Commonly known as:  Ace Sa Your next dose is: Take on as needed schedule 1/2 to 1 qid prn anxiety  Indications: anxiety AMITIZA 8 mcg capsule Generic drug:  lubiPROStone Your next dose is:  Tomorrow Morning Take 8 mcg by mouth daily (with breakfast). 8 mcg  
    
  
   
   
   
  
 atorvastatin 80 mg tablet Commonly known as:  LIPITOR Your next dose is:  Tomorrow Morning Take 80 mg by mouth daily. 80 mg  
    
  
   
   
   
  
 cephALEXin 500 mg capsule Commonly known as:  Mary Screws Your next dose is:  TODAY, afternoon, evening, and bedtime Notes to Patient:  Continue taking 4 x a day until you have taken all of the medication Take 1 Cap by mouth four (4) times daily. 500 mg HYDROcodone-acetaminophen 5-325 mg per tablet Commonly known as:  Unique Oyster Your next dose is: Take on as needed schedule, use caution as you are also prescribed Percocet (see above instructions) 1 q6h prn severe pain  Indications: Pain LASIX 40 mg tablet Generic drug:  furosemide Your next dose is:  Tomorrow Morning Take 40 mg by mouth daily. 40 mg  
    
  
   
   
   
  
 metoprolol succinate 200 mg XL tablet Commonly known as:  TOPROL-XL Your next dose is:  Tomorrow Morning Take 100 mg by mouth daily.   
 100 mg  
    
  
   
 multivitamin tablet Commonly known as:  ONE A DAY Your next dose is:  Tomorrow Morning Take 1 Tab by mouth daily. 1 Tab  
    
  
   
   
   
  
 naloxone 4 mg/actuation nasal spray Commonly known as:  [employer] Use 1 spray intranasally into 1 nostril. Use a new Narcan nasal spray for subsequent doses and administer into alternating nostrils. May repeat every 2 to 3 minutes as needed. Indications: Opioid Toxicity NAPROSYN 500 mg tablet Generic drug:  naproxen Your next dose is: This evening Take 500 mg by mouth two (2) times daily (with meals). 500 mg Omeprazole delayed release 20 mg tablet Commonly known as:  PRILOSEC D/R Your next dose is: This evening 1 bid for stomach acid  
     
  
   
   
  
   
  
 sildenafil citrate 100 mg tablet Commonly known as:  VIAGRA Your next dose is: Take on as needed schedule 1/2 to 1 q 24h prn  
     
   
   
   
  
 valsartan-hydroCHLOROthiazide 320-12.5 mg per tablet Commonly known as:  DIOVAN-HCT Your next dose is:  Tomorrow Morning Take 0.5 Tabs by mouth daily. 0.5 Tab Where to Get Your Medications Information on where to get these meds will be given to you by the nurse or doctor. ! Ask your nurse or doctor about these medications  
  oxyCODONE-acetaminophen 7.5-325 mg per tablet Opioid Education Prescription Opioids: What You Need to Know: 
 
Prescription opioids can be used to help relieve moderate-to-severe pain and are often prescribed following a surgery or injury, or for certain health conditions. These medications can be an important part of treatment but also come with serious risks. Opioids are strong pain medicines. Examples include hydrocodone, oxycodone, fentanyl, and morphine. Heroin is an example of an illegal opioid.   It is important to work with your health care provider to make sure you are getting the safest, most effective care. WHAT ARE THE RISKS AND SIDE EFFECTS OF OPIOID USE? Prescription opioids carry serious risks of addiction and overdose, especially with prolonged use. An opioid overdose, often marked by slow breathing, can cause sudden death. The use of prescription opioids can have a number of side effects as well, even when taken as directed. · Tolerance-meaning you might need to take more of a medication for the same pain relief · Physical dependence-meaning you have symptoms of withdrawal when the medication is stopped. Withdrawal symptoms can include nausea, sweating, chills, diarrhea, stomach cramps, and muscle aches. Withdrawal can last up to several weeks, depending on which drug you took and how long you took it. · Increased sensitivity to pain · Constipation · Nausea, vomiting, and dry mouth · Sleepiness and dizziness · Confusion · Depression · Low levels of testosterone that can result in lower sex drive, energy, and strength · Itching and sweating RISKS ARE GREATER WITH:      
· History of drug misuse, substance use disorder, or overdose · Mental health conditions (such as depression or anxiety) · Sleep apnea · Older age (72 years or older) · Pregnancy Avoid alcohol while taking prescription opioids. Also, unless specifically advised by your health care provider, medications to avoid include: · Benzodiazepines (such as Xanax or Valium) · Muscle relaxants (such as Soma or Flexeril) · Hypnotics (such as Ambien or Lunesta) · Other prescription opioids KNOW YOUR OPTIONS Talk to your health care provider about ways to manage your pain that don't involve prescription opioids. Some of these options may actually work better and have fewer risks and side effects. Options may include: 
· Pain relievers such as acetaminophen, ibuprofen, and naproxen · Some medications that are also used for depression or seizures · Physical therapy and exercise · Counseling to help patients learn how to cope better with triggers of pain and stress. · Application of heat or cold compress · Massage therapy · Relaxation techniques Be Informed Make sure you know the name of your medication, how much and how often to take it, and its potential risks & side effects. IF YOU ARE PRESCRIBED OPIOIDS FOR PAIN: 
· Never take opioids in greater amounts or more often than prescribed. Remember the goal is not to be pain-free but to manage your pain at a tolerable level. · Follow up with your primary care provider to: · Work together to create a plan on how to manage your pain. · Talk about ways to help manage your pain that don't involve prescription opioids. · Talk about any and all concerns and side effects. · Help prevent misuse and abuse. · Never sell or share prescription opioids · Help prevent misuse and abuse. · Store prescription opioids in a secure place and out of reach of others (this may include visitors, children, friends, and family). · Safely dispose of unused/unwanted prescription opioids: Find your community drug take-back program or your pharmacy mail-back program, or flush them down the toilet, following guidance from the Food and Drug Administration (www.fda.gov/Drugs/ResourcesForYou). · Visit www.cdc.gov/drugoverdose to learn about the risks of opioid abuse and overdose. · If you believe you may be struggling with addiction, tell your health care provider and ask for guidance or call [address] [place] [address] at [phone]. Discharge Instructions Infection After Surgery: Care Instructions Your Care Instructions After surgery, an infection is always possible. It doesn't mean that the surgery didn't go well. Because an infection can be serious, your doctor has taken steps to manage it. Your doctor checked the infection and cleaned it if necessary. He or she may have made an opening in the area so that the pus can drain out. You may have gauze in the cut so that the area will stay open and keep draining. You may need antibiotics. You will need to follow up with your doctor to make sure the infection has gone away. Follow-up care is a key part of your treatment and safety. Be sure to make and go to all appointments, and call your doctor if you are having problems. It's also a good idea to know your test results and keep a list of the medicines you take. How can you care for yourself at home? · Make sure your surgeon knows that you saw a doctor about the infection. · If your doctor prescribed antibiotics, take them as directed. Do not stop taking them just because you feel better. You need to take the full course of antibiotics. · Ask your doctor if you can take an over-the-counter pain medicine, such as acetaminophen (Tylenol), ibuprofen (Advil, Motrin), or naproxen (Aleve). Be safe with medicines. Read and follow all instructions on the label. · Do not take two or more pain medicines at the same time unless the doctor told you to. Many pain medicines have acetaminophen, which is Tylenol. Too much acetaminophen (Tylenol) can be harmful. · Prop up the area on a pillow anytime you sit or lie down during the next 3 days. Try to keep it above the level of your heart. This will help reduce swelling. · Keep the skin clean and dry. · If you have a bandage, keep it clean and dry. · You may have a dressing over the cut (incision). A dressing helps the incision heal and protects it. Your doctor will tell you how to take care of this. You can expect drainage from the wound. · If your doctor told you how to care for your incision, follow your doctor's instructions.  If you did not get instructions, follow this general advice: ¨ Wash around the incision with clean water 2 times a day. Don't use hydrogen peroxide or alcohol, which can slow healing. When should you call for help? Call your doctor now or seek immediate medical care if: 
? · You have signs that your infection is getting worse, such as: 
¨ Increased pain, swelling, warmth, or redness in the area. ¨ Red streaks leading from the area. ¨ Pus draining from the wound. ¨ A new or higher fever. ? Watch closely for changes in your health, and be sure to contact your doctor if you have any problems. Where can you learn more? Go to http://bessy-mimi.info/. Enter C340 in the search box to learn more about \"Infection After Surgery: Care Instructions. \" Current as of: March 20, 2017 Content Version: 11.4 © 3886-6431 [employer]. Care instructions adapted under license by [employer] (which disclaims liability or warranty for this information). If you have questions about a medical condition or this instruction, always ask your healthcare professional. [place] [address] any warranty or liability for your use of this information. DISCHARGE SUMMARY from Nurse PATIENT INSTRUCTIONS: 
 
 
F-face looks uneven A-arms unable to move or move unevenly S-speech slurred or non-existent T-time-call 911 as soon as signs and symptoms begin-DO NOT go Back to bed or wait to see if you get better-TIME IS BRAIN. Warning Signs of HEART ATTACK Call 911 if you have these symptoms: 
? Chest discomfort. Most heart attacks involve discomfort in the center of the chest that lasts more than a few minutes, or that goes away and comes back. It can feel like uncomfortable pressure, squeezing, fullness, or pain. ? Discomfort in other areas of the upper body. Symptoms can include pain or discomfort in one or both arms, the back, neck, jaw, or stomach. ? Shortness of breath with or without chest discomfort. ? Other signs may include breaking out in a cold sweat, nausea, or lightheadedness. Don't wait more than five minutes to call [address]! Fast action can save your life. Calling 911 is almost always the fastest way to get lifesaving treatment. Emergency Medical Services staff can begin treatment when they arrive  up to an hour sooner than if someone gets to the hospital by car. The discharge information has been reviewed with the patient. The patient verbalized understanding. Discharge medications reviewed with the patient and appropriate educational materials and side effects teaching were provided. ___________________________________________________________________________________________________________________________________ Introducing [facility]! Cony Wilde introduces Ge.tt patient portal. Now you can access parts of your medical record, email your doctor's office, and request medication refills online. 1. In your internet browser, go to [email or web address] 2. Click on the First Time User? Click Here link in the Sign In box. You will see the New Member Sign Up page. 3. Enter your Ge.tt Access Code exactly as it appears below. You will not need to use this code after youve completed the sign-up process. If you do not sign up before the expiration date, you must request a new code. · Ge.tt Access Code: [email or web address] Expires: 7/1/2018  4:09 PM 
 
4. Enter the last four digits of your Social Security Number (xxxx) and Date of Birth (mm/dd/yyyy) as indicated and click Submit. You will be taken to the next sign-up page. 5. Create a MyChart ID.  This will be your Vaxartt login ID and cannot be changed, so think of one that is secure and easy to remember. 6. Create a Fangcang password. You can change your password at any time. 7. Enter your Password Reset Question and Answer. This can be used at a later time if you forget your password. 8. Enter your e-mail address. You will receive e-mail notification when new information is available in [address] 9. Click Sign Up. You can now view and download portions of your medical record. 10. Click the Download Summary menu link to download a portable copy of your medical information. If you have questions, please visit the Frequently Asked Questions section of the Fangcang website. Remember, Fangcang is NOT to be used for urgent needs. For medical emergencies, dial 911. Now available from your iPhone and Android! Introducing Lauro Gardiner As a [facility]son patient, I wanted to make you aware of our electronic visit tool called Lauro Karlodeepakrose mary. [facility]ul[facility] Hopson [employer] allows you to connect within minutes with a medical provider 24 hours a day, seven days a week via a mobile device or tablet or logging into a secure website from your computer. You can access Lauro Zuleyka from anywhere in the [place]. A virtual visit might be right for you when you have a simple condition and feel like you just dont want to get out of bed, or cant get away from work for an appointment, when your regular [facility] Hopson provider is not available (evenings, weekends or holidays), or when youre out of town and need minor care. Electronic visits cost only $49 and if the Zulay Hopson Pivto/Bing provider determines a prescription is needed to treat your condition, one can be electronically transmitted to a nearby pharmacy*. Please take a moment to enroll today if you have not already done so. The enrollment process is free and takes just a few minutes.   To enroll, please download the Zulay Hopson [employer] mary to your tablet or phone, or visit [email or web address] to enroll on your computer. And, as an [address] [place] [address] patient with a Yagomart account, the results of your visits will be scanned into your electronic medical record and your primary care provider will be able to view the scanned results. We urge you to continue to see your regular [facility] provider for your ongoing medical care. And while your primary care provider may not be the one available when you seek a Lauro Dietrichdeepakfin virtual visit, the peace of mind you get from getting a real diagnosis real time can be priceless. For more information on Avalon Clonesdeepakfin, view our Frequently Asked Questions (FAQs) at [email or web address]. Sincerely, 
 
Reba Malik MD 
Chief Medical Officer [facility] Ena Loera *:  certain medications cannot be prescribed via Avalon Clonesdeepak"[employer] Unresulted tests-please follow up with your PCP on these results Procedure/Test Authorizing Provider C REACTIVE PROTEIN, QT Maida Dust, NP  
 CBC WITH AUTOMATED DIFF Maida Dust, NP  
 CULTURE, ANAEROBIC Claudene Monarch, MD  
 CULTURE, WOUND W GRAM STAIN Claudene Monarch, MD  
 METABOLIC PANEL, COMPREHENSIVE Maida Dust, NP  
 SED RATE, AUTOMATED Maida Dust, NP Providers Seen During Your Hospitalization Provider Specialty Primary office phone Claudene Monarch, MD Neurosurgery [phone] Your Primary Care Physician (PCP) Primary Care Physician Office Phone Office Fax Brenda Emery [phone] You are allergic to the following Allergen Reactions Beet Rash Recent Documentation Height Weight BMI Smoking Status 1.727 m 101.5 kg 34.01 kg/m2 Former Smoker Emergency Contacts Name Discharge Info Relation Home Work Mobile Maximiliano Gunn  Spouse [3] [phone] Patient Belongings The following personal items are in your possession at time of discharge: 
  Dental Appliances: Uppers, Lowers  Visual Aid: Glasses   Hearing Aids/Status: Does not own  Home Medications: None   Jewelry: None  Clothing: Footwear, Pants, Shirt, Undergarments    Other Valuables: None Please provide this summary of care documentation to your next provider. Signatures-by signing, you are acknowledging that this After Visit Summary has been reviewed with you and you have received a copy. Patient Signature:  ____________________________________________________________ Date:  ____________________________________________________________  
  
[facility] Provider Signature:  ____________________________________________________________ Date:  ____________________________________________________________

## 2018-06-22 NOTE — ANESTHESIA PREPROCEDURE EVALUATION
Anesthetic History   No history of anesthetic complications            Review of Systems / Medical History  Patient summary reviewed and pertinent labs reviewed    Pulmonary    COPD: mild               Neuro/Psych       CVA (slightly weak right arm and slurred speech)       Cardiovascular    Hypertension: well controlled              Exercise tolerance: <4 METS: Limited by back pain and leg pain  Comments: Denies CP, SOB or changes in functional status and is limited only by lower back pain/leg pain   GI/Hepatic/Renal     GERD: well controlled           Endo/Other        Obesity and arthritis     Other Findings              Physical Exam    Airway  Mallampati: III  TM Distance: < 4 cm  Neck ROM: short neck   Mouth opening: Diminished (comment)    Comments: Full beard Cardiovascular    Rhythm: regular  Rate: normal         Dental    Dentition: Full lower dentures and Full upper dentures     Pulmonary  Breath sounds clear to auscultation               Abdominal         Other Findings            Anesthetic Plan    ASA: 3, emergent  Anesthesia type: general          Induction: Intravenous  Anesthetic plan and risks discussed with: Patient and Spouse

## 2018-06-23 PROCEDURE — 36569 INSJ PICC 5 YR+ W/O IMAGING: CPT | Performed by: INTERNAL MEDICINE

## 2018-06-23 PROCEDURE — 97530 THERAPEUTIC ACTIVITIES: CPT

## 2018-06-23 PROCEDURE — 74011000250 HC RX REV CODE- 250: Performed by: NEUROLOGICAL SURGERY

## 2018-06-23 PROCEDURE — 74011250636 HC RX REV CODE- 250/636: Performed by: NEUROLOGICAL SURGERY

## 2018-06-23 PROCEDURE — 97161 PT EVAL LOW COMPLEX 20 MIN: CPT

## 2018-06-23 PROCEDURE — 74011250637 HC RX REV CODE- 250/637: Performed by: NEUROLOGICAL SURGERY

## 2018-06-23 PROCEDURE — 74011250636 HC RX REV CODE- 250/636: Performed by: NURSE PRACTITIONER

## 2018-06-23 PROCEDURE — 65270000029 HC RM PRIVATE

## 2018-06-23 RX ORDER — CEFAZOLIN SODIUM/WATER 2 G/20 ML
2 SYRINGE (ML) INTRAVENOUS EVERY 8 HOURS
Status: DISCONTINUED | OUTPATIENT
Start: 2018-06-23 | End: 2018-06-27 | Stop reason: HOSPADM

## 2018-06-23 RX ADMIN — Medication 10 ML: at 23:58

## 2018-06-23 RX ADMIN — Medication 5 ML: at 17:00

## 2018-06-23 RX ADMIN — Medication 10 ML: at 23:59

## 2018-06-23 RX ADMIN — NAPROXEN 500 MG: 250 TABLET ORAL at 08:07

## 2018-06-23 RX ADMIN — Medication 10 ML: at 05:44

## 2018-06-23 RX ADMIN — ALPRAZOLAM 2 MG: 0.5 TABLET ORAL at 16:59

## 2018-06-23 RX ADMIN — FUROSEMIDE 40 MG: 40 TABLET ORAL at 08:07

## 2018-06-23 RX ADMIN — OXYCODONE HYDROCHLORIDE AND ACETAMINOPHEN 1 TABLET: 7.5; 325 TABLET ORAL at 11:25

## 2018-06-23 RX ADMIN — PANTOPRAZOLE SODIUM 40 MG: 40 TABLET, DELAYED RELEASE ORAL at 05:40

## 2018-06-23 RX ADMIN — METOPROLOL SUCCINATE 100 MG: 100 TABLET, EXTENDED RELEASE ORAL at 08:07

## 2018-06-23 RX ADMIN — NAPROXEN 500 MG: 250 TABLET ORAL at 16:54

## 2018-06-23 RX ADMIN — PROMETHAZINE HYDROCHLORIDE 12.5 MG: 25 INJECTION INTRAMUSCULAR; INTRAVENOUS at 01:35

## 2018-06-23 RX ADMIN — HYDROMORPHONE HYDROCHLORIDE 1 MG: 2 INJECTION, SOLUTION INTRAMUSCULAR; INTRAVENOUS; SUBCUTANEOUS at 01:34

## 2018-06-23 RX ADMIN — Medication 2 G: at 08:16

## 2018-06-23 RX ADMIN — Medication 2 G: at 16:54

## 2018-06-23 RX ADMIN — ATORVASTATIN CALCIUM 80 MG: 40 TABLET, FILM COATED ORAL at 08:07

## 2018-06-23 RX ADMIN — Medication 2 G: at 23:57

## 2018-06-23 RX ADMIN — MULTIPLE VITAMINS W/ MINERALS TAB 1 TABLET: TAB at 08:07

## 2018-06-23 NOTE — PROGRESS NOTES
Admit Date: 6/22/2018    NS  POD 1    Afeb. VSS. Surgical cx in progress. He's up walking in room. Alert. Dressing intact. He can stand on his toes. A: s/p I and D of lumbar wound. P: Abx per I.D. Activity as tolerated. Mechanical DVT prophy.

## 2018-06-23 NOTE — PROGRESS NOTES
Problem: Falls - Risk of  Goal: *Absence of Falls  Document Toni Fall Risk and appropriate interventions in the flowsheet. Outcome: Progressing Towards Goal  Fall Risk Interventions:  Mobility Interventions: Bed/chair exit alarm, Patient to call before getting OOB    Mentation Interventions: Bed/chair exit alarm, Adequate sleep, hydration, pain control    Medication Interventions: Bed/chair exit alarm, Patient to call before getting OOB    Elimination Interventions: Bed/chair exit alarm, Call light in reach, Patient to call for help with toileting needs    History of Falls Interventions: Bed/chair exit alarm        Problem: Pressure Injury - Risk of  Goal: *Prevention of pressure injury  Document Barrera Scale and appropriate interventions in the flowsheet.   Outcome: Progressing Towards Goal  Pressure Injury Interventions:  Sensory Interventions: Assess changes in LOC    Moisture Interventions: Absorbent underpads    Activity Interventions: Increase time out of bed, Pressure redistribution bed/mattress(bed type), PT/OT evaluation    Mobility Interventions: HOB 30 degrees or less, Pressure redistribution bed/mattress (bed type), PT/OT evaluation    Nutrition Interventions: Document food/fluid/supplement intake

## 2018-06-23 NOTE — PROGRESS NOTES
Problem: Mobility Impaired (Adult and Pediatric)  Goal: *Therapy Goal (Edit Goal, Insert Text)  STG:  (1.)Mr. Jona Sacks will move from supine to sit and sit to supine , scoot up and down and roll side to side with MODIFIED INDEPENDENCE within 4 treatment day(s). (2.)Mr. Jona Sacks will transfer from bed to chair and chair to bed with MODIFIED INDEPENDENCE using the least restrictive device within 4 treatment day(s). (3.)Mr. Jona Sacks will ambulate with MODIFIED INDEPENDENCE for 500 feet with the least restrictive device within 4 treatment day(s). LTG:  (1.)Mr. Jona Sacks will move from supine to sit and sit to supine , scoot up and down and roll side to side in bed with INDEPENDENT within 7 treatment day(s). (2.)Mr. Jona Sacks will transfer from bed to chair and chair to bed with INDEPENDENT using the least restrictive device within 7 treatment day(s). (3.)Mr. Jona Sacks will ambulate with INDEPENDENT for 1000 feet with the least restrictive device within 7 treatment day(s). ________________________________________________________________________________________________       PHYSICAL THERAPY: Initial Assessment, Treatment Day: Day of Assessment 6/23/2018  INPATIENT: Hospital Day: 2  Payor: Jane Ott / Plan: [address] [place] [address] / Product Type: PPO /      NAME/AGE/GENDER: James Buck is a 58 y.o. male   PRIMARY DIAGNOSIS: Postoperative infection, subsequent encounter [T81. 4XXD] Postoperative wound infection Postoperative wound infection  Procedure(s) (LRB):  INCISION AND DRAINAGE OF LUMBAR WOUND (N/A)  1 Day Post-Op  ICD-10: Treatment Diagnosis:    · Generalized Muscle Weakness (M62.81)  · Other lack of cordination (R27.8)  · Difficulty in walking, Not elsewhere classified (R26.2)  · Other abnormalities of gait and mobility (R26.89)   Precaution/Allergies:  Beet      ASSESSMENT:     Mr. Jona Sacks presents with decreased transfers, ambulation and mobility with above diagnosis.     He demonstrates transfers of SBA x 1 to CGA x 1 out of chair to stand and then SBA x 1 to CGA x 1 for ambulation. Then, he ambulates for 350 feet without assistive device and without complaints of increased pain and mild SOB then returned to room and is seated in bedside chair up to CGA x 1. Skilled PT is indicated for patient safety and mobility progression during current acute care episode. This section established at most recent assessment   PROBLEM LIST (Impairments causing functional limitations):  1. Decreased Strength  2. Decreased ADL/Functional Activities  3. Decreased Transfer Abilities  4. Decreased Ambulation Ability/Technique  5. Decreased Balance  6. Decreased Activity Tolerance  7. Decreased Pacing Skills  8. Decreased Flexibility/Joint Mobility  9. Decreased [place] with Home Exercise Program   INTERVENTIONS PLANNED: (Benefits and precautions of physical therapy have been discussed with the patient.)  1. Balance Exercise  2. Bed Mobility  3. Family Education  4. Gait Training  5. Home Exercise Program (HEP)  6. Range of Motion (ROM)  7. Therapeutic Activites  8. Therapeutic Exercise/Strengthening  9. Transfer Training     TREATMENT PLAN: Frequency/Duration: daily for duration of hospital stay  Rehabilitation Potential For Stated Goals: Good     RECOMMENDED REHABILITATION/EQUIPMENT: (at time of discharge pending progress): Due to the probability of continued deficits (see above) this patient will likely need continued skilled physical therapy after discharge. Equipment:    None at this time              HISTORY:   History of Present Injury/Illness (Reason for Referral):  Patient is post operative for lumbar incision revision secondary to prior post operative infection. Past Medical History/Comorbidities:   Mr. Rosa Azar  has a past medical history of Adverse effect of anesthesia; Anxiety; Arthritis; Edema; GERD (gastroesophageal reflux disease); Hypertension;  Inguinal hernia; PUD (peptic ulcer disease) (36 yrs ago); Severe obesity (BMI 35.0-39. 9) with comorbidity ([address] (4/24/2018); Status post left hip replacement (10/14/2015); and Stroke [facility]) (2002). Mr. Klaudia Youngblood  has a past surgical history that includes hx endoscopy; hx gi; hx carotid endarterectomy (Bilateral); hx appendectomy; hx urological; hx hernia repair; hx tonsillectomy; hx adenoidectomy; hx hip replacement; hx orthopaedic (Right); hx orthopaedic (Left); hx orthopaedic (Right); and hx orthopaedic (05/30/2018). Social History/Living Environment:   Home Environment: Private residence  # Steps to Enter: 4  One/Two Story Residence: One story  Living Alone: No  Support Systems: Spouse/Significant Other/Partner  Patient Expects to be Discharged to[de-identified] Private residence  Current DME Used/Available at Home: None  Prior Level of Function/Work/Activity:  Had been independent with all functional mobility. Dominant Side:         RIGHT  Personal Factors:          Sex:  male        Age:  58 y.o.    Number of Personal Factors/Comorbidities that affect the Plan of Care: 0: LOW COMPLEXITY   EXAMINATION:   Most Recent Physical Functioning:   Gross Assessment:  AROM: Generally decreased, functional  Strength: Generally decreased, functional  Coordination: Generally decreased, functional  Tone: Normal  Sensation: Intact               Posture:  Posture (WDL): Exceptions to WDL  Posture Assessment: Cervical, Rounded shoulders  Balance:  Sitting: Impaired  Sitting - Static: Good (unsupported)  Sitting - Dynamic: Fair (occasional)  Standing: Impaired  Standing - Static: Fair  Standing - Dynamic : Fair Bed Mobility:  Rolling: Stand-by assistance;Contact guard assistance  Supine to Sit: Stand-by assistance;Contact guard assistance  Sit to Supine: Stand-by assistance;Contact guard assistance  Scooting: Stand-by assistance;Contact guard assistance  Wheelchair Mobility:     Transfers:  Sit to Stand: Stand-by assistance;Contact guard assistance  Stand to Sit: Stand-by assistance;Contact guard assistance  Bed to Chair: Stand-by assistance;Contact guard assistance  Gait:     Base of Support: Center of gravity altered  Speed/Rukhsana: Delayed; Fluctuations; Pace decreased (<100 feet/min); Shuffled; Slow  Step Length: Left shortened;Right shortened  Swing Pattern: Left asymmetrical;Right asymmetrical  Stance: Left decreased;Right decreased;Time;Weight shift  Gait Abnormalities: Decreased step clearance;Trunk sway increased  Distance (ft): 350 Feet (ft)  Assistive Device:  (no assistive device required to safely ambulate\)  Ambulation - Level of Assistance: Stand-by assistance;Contact guard assistance  Interventions: Manual cues; Safety awareness training; Tactile cues; Verbal cues; Visual/Demos      Body Structures Involved:  1. Joints  2. Muscles  3. Ligaments Body Functions Affected:  1. Neuromusculoskeletal  2. Movement Related  3. Skin Related  4. Metobolic/Endocrine Activities and Participation Affected:  1. General Tasks and Demands  2. Communication  3. Mobility  4. Community, Social and [place]   Number of elements that affect the Plan of Care: 1-2: LOW COMPLEXITY   CLINICAL PRESENTATION:   Presentation: Stable and uncomplicated: LOW COMPLEXITY   CLINICAL DECISION MAKIN [facility] Inpatient Short Form  How much difficulty does the patient currently have. .. Unable A Lot A Little None   1. Turning over in bed (including adjusting bedclothes, sheets and blankets)? [] 1   [] 2   [x] 3   [] 4   2. Sitting down on and standing up from a chair with arms ( e.g., wheelchair, bedside commode, etc.)   [] 1   [] 2   [x] 3   [] 4   3. Moving from lying on back to sitting on the side of the bed? [] 1   [] 2   [x] 3   [] 4   How much help from another person does the patient currently need. .. Total A Lot A Little None   4. Moving to and from a bed to a chair (including a wheelchair)? [] 1   [] 2   [x] 3   [] 4   5.   Need to walk in hospital room?   [] 1   [] 2   [x] 3   [] 4   6. Climbing 3-5 steps with a railing? [] 1   [] 2   [x] 3   [] 4   © 2007, Trustees of [address] [place] [address] [address] [address], under license to [employer]. All rights reserved      Score:  Initial: 18 Most Recent: X (Date: -- )    Interpretation of Tool:  Represents activities that are increasingly more difficult (i.e. Bed mobility, Transfers, Gait). Score 24 23 22-20 19-15 14-10 9-7 6     Modifier CH CI CJ CK CL CM CN      ? Mobility - Walking and Moving Around:     - CURRENT STATUS: CK - 40%-59% impaired, limited or restricted    - GOAL STATUS: CJ - 20%-39% impaired, limited or restricted    - D/C STATUS:  ---------------To be determined---------------  Payor: HARLEY / Plan: Yaron Iverson / Product Type: PPO /      Medical Necessity:     · Patient demonstrates good rehab potential due to higher previous functional level. Reason for Services/Other Comments:  · Patient continues to require skilled intervention due to medical complications, patient unable to attend/participate in therapy as expected and secondary surgery to clear infection. Use of outcome tool(s) and clinical judgement create a POC that gives a: Clear prediction of patient's progress: LOW COMPLEXITY            TREATMENT:   (In addition to Assessment/Re-Assessment sessions the following treatments were rendered)   Pre-treatment Symptoms/Complaints:  0/10 no specific pain complaints. Pain: Initial:   Pain Intensity 1: 0 (no specific pain complaints at this time)  Post Session:  0/10 no specific pain complaints. Therapeutic Activity: (    8 mins): Therapeutic activities including Bed transfers, Chair transfers, Ambulation on level ground and dynamic safety cuing to improve mobility, strength, balance and dynamic movement of leg - bilateral to improve functional dynamic mobility. Required minimal Manual cues; Safety awareness training; Tactile cues; Verbal cues; Visual/Demos to promote coordination of bilateral, lower extremity(s). Date:   Date:   Date:     Activity/Exercise Parameters Parameters Parameters                                               Braces/Orthotics/Lines/Etc:   · O2 Device: Room air  Treatment/Session Assessment:    · Response to Treatment:  Evaluation and therapeutic activities. · Interdisciplinary Collaboration:   o Physical Therapist  o Registered Nurse  · After treatment position/precautions:   o Up in chair  o Bed alarm/tab alert on  o Bed/Chair-wheels locked  o Bed in low position  o Call light within reach   · Compliance with Program/Exercises: Will assess as treatment progresses. · Recommendations/Intent for next treatment session: \"Next visit will focus on advancements to more challenging activities, reduction in assistance provided and transfers, ambulation and mobility with increased safety. \".   Total Treatment Duration:  PT Patient Time In/Time Out  Time In: 4951  Time Out: [place], [place]

## 2018-06-23 NOTE — PROGRESS NOTES
Problem: Patient Education: Go to Patient Education Activity  Goal: Patient/Family Education  Outcome: Not Progressing Towards Goal  Educated patient and family about bed alarm and using the call light to ask for assistance.  Agreed but have been non-complaint

## 2018-06-23 NOTE — PROGRESS NOTES
Spoke with patient and wife concerning  fluocinonide cream. Does not use it anymore. Spoke to pharmacy and they will remove.

## 2018-06-23 NOTE — CONSULTS
Infectious Disease Consult    Today's Date: 6/23/2018   Admit Date: 6/22/2018    Impression:   · Postop lumbar wound infection s/p I/D (6/22/18), OP cx pending. Wound swab + staph lugdenensis  · S/p bilateral laminectomy with medial fasciotomy (L4-S1) (5/30/18)  · Hx of L JABIER and CVA (speech and R sided deficits)  · Chronic pain    Plan:   · Continue Ancef 2g IV q8h. Duration 6 weeks  · Follow OP cx and OP report      Anti-infectives:     Inpat Anti-Infectives     Start     Ordered Stop    06/23/18 0000  ceFAZolin (ANCEF) 2 g/20 mL in sterile water IV syringe  2 g,   IntraVENous,   EVERY 8 HOURS         06/22/18 4173 06/25/18 3596          Subjective:   Date of Consultation:  June 23, 2018  Referring Physician: Dusty Castillo wound infection    Patient is a 58 y.o. male admitted on 6/22 for increasing redness and drainage from postop lumbar incision site. Originally underwent bilateral laminectomy with medial fasciotomy (L4-S1) on May 30 due to lumbar stenosis and neurogenic claudication. From wound checks, site noted to be draining on June 13--this increasingly progressed. Patient was seen with primary care physician on June 20 and was given a prescription for Keflex, incision site swabbed. Wound swab positive for staph  lugdenensis. Patient was then seen in Dr. Chikis Anderson office on June 21--operative debridement performed on June 22. Full operative report not available. Operative cultures pending Patient denies fevers/chills/sweats. No recorded fevers. No recent CBC or CMP. Started on Ancef postoperatively.      Patient Active Problem List   Diagnosis Code    Bilateral lower extremity edema R60.0    Esophageal reflux K21.9    Esophageal stricture K22.2    H/O: CVA (cerebrovascular accident) Z86.73    Bilateral carotid artery stenosis I65.23    Hyperlipidemia E78.5    Claudication (HCC) I73.9    Gynecomastia, male N58    Status post left hip replacement Z96.642    Anxiety F41.9    Benign essential HTN I10    Chronic pain syndrome G89.4    Severe obesity (BMI 35.0-39. 9) with comorbidity ([facility] E66.01    Lumbar stenosis with neurogenic claudication M48.062    Postoperative wound infection T81. 4XXA     Past Medical History:   Diagnosis Date    Adverse effect of anesthesia     wife states had delayed awaken    Anxiety     takes alprazolam daily    Arthritis     Edema     BLE; takes spironolactone daily    GERD (gastroesophageal reflux disease)     managed w/med    Hypertension     managed w/med    Inguinal hernia     left per pt    PUD (peptic ulcer disease) 40 yrs ago    pt states no current issues    Severe obesity (BMI 35.0-39. 9) with comorbidity ([address] 4/24/2018    Status post left hip replacement 10/14/2015    Stroke [facility]) 2002    pt states still limps w/right leg, has problems with speech & problems writing at times      Family History   Problem Relation Age of Onset    Arthritis-osteo Mother     Diabetes Mother     Diabetes Father     Arthritis-osteo Father     Cancer Maternal Grandmother     Breast Cancer Neg Hx       Social History   Substance Use Topics    Smoking status: Former Smoker     Packs/day: 1.50     Years: 20.00     Quit date: 5/24/2002    Smokeless tobacco: Never Used      Comment: quit smoking in 2002    Alcohol use No      Comment: pt was drinking wine in the evening but stopped last week due to surgery     Past Surgical History:   Procedure Laterality Date    HX ADENOIDECTOMY      HX APPENDECTOMY      HX CAROTID ENDARTERECTOMY Bilateral     HX ENDOSCOPY      multiple with dilation    HX GI      esophageal dilation    HX HERNIA REPAIR      HX HIP REPLACEMENT      HX ORTHOPAEDIC Right     knee    HX ORTHOPAEDIC Left     thumb; bone removed    HX ORTHOPAEDIC Right     middle finger sx    HX ORTHOPAEDIC  05/30/2018    lumbar surgery, bilateral laminectomy    HX TONSILLECTOMY      HX UROLOGICAL      testicular sx due to injury as a child      Prior to Admission medications    Medication Sig Start Date End Date Taking? Authorizing Provider   atorvastatin (LIPITOR) 80 mg tablet Take 80 mg by mouth daily. Yes Historical Provider   furosemide (LASIX) 40 mg tablet Take 40 mg by mouth daily. Yes Historical Provider   lubiPROStone (AMITIZA) 8 mcg capsule Take 8 mcg by mouth daily (with breakfast). Yes Historical Provider   metoprolol succinate (TOPROL-XL) 200 mg XL tablet Take 100 mg by mouth daily. Yes Historical Provider   valsartan-hydroCHLOROthiazide (DIOVAN-HCT) 320-12.5 mg per tablet Take 0.5 Tabs by mouth daily. Yes Historical Provider   cephALEXin (KEFLEX) 500 mg capsule Take 1 Cap by mouth four (4) times daily. 6/19/18  Yes Khushbu Pena MD   HYDROcodone-acetaminophen [facility]) 5-325 mg per tablet 1 q6h prn severe pain  Indications: Pain 6/8/18  Yes Bharat Mattson MD   multivitamin (ONE A DAY) tablet Take 1 Tab by mouth daily. Yes Historical Provider   ALPRAZolam Neyda Abo) 2 mg tablet 1/2 to 1 qid prn anxiety  Indications: anxiety 4/24/18  Yes Bharat Mattson MD   Omeprazole delayed release (PRILOSEC D/R) 20 mg tablet 1 bid for stomach acid 4/24/18  Yes Bharat Mattson MD   naproxen (NAPROSYN) 500 mg tablet Take 500 mg by mouth two (2) times daily (with meals). Historical Provider   sildenafil citrate (VIAGRA) 100 mg tablet 1/2 to 1 q 24h prn 6/20/18   Bharat Mattson MD   naloxone [facility]) 4 mg/actuation nasal spray Use 1 spray intranasally into 1 nostril. Use a new Narcan nasal spray for subsequent doses and administer into alternating nostrils. May repeat every 2 to 3 minutes as needed. Indications: Opioid Toxicity 6/8/18   Bharat Mattson MD   fluocinoNIDE (LIDEX) 0.05 % topical cream Apply  to affected area two (2) times a day.  4/24/18   Bharat Mattson MD       Allergies   Allergen Reactions    Beet Rash        Review of Systems:  A comprehensive review of systems was negative except for that written in the History of Present Illness. Objective:     Visit Vitals    /81 (BP 1 Location: Right arm, BP Patient Position: Sitting)    Pulse 100    Temp 98.3 °F (36.8 °C)    Resp 19    Ht 5' 8\" (1.727 m)    Wt 101.5 kg (223 lb 11.2 oz)    SpO2 97%    BMI 34.01 kg/m2     Temp (24hrs), Av.3 °F (36.8 °C), Min:97.9 °F (36.6 °C), Max:98.8 °F (37.1 °C)       Lines:  Peripheral IV:            Physical Exam:    General:  Alert, cooperative, well noursished, well developed, appears stated age   Eyes:  Sclera anicteric. Pupils equally round and reactive to light. Mouth/Throat: Mucous membranes normal, oral pharynx clear, slurred speech    Neck: Supple   Lungs:   Clear to auscultation bilaterally, good effort   CV:  Regular rate and rhythm,no murmur, click, rub or gallop   Abdomen:   Soft, non-tender. bowel sounds normal. non-distended   Extremities: No cyanosis or edema   Skin: Lumbar incision with dsg saturated in serous fluid, no cellulitis    Lymph nodes: Cervical and supraclavicular normal   Musculoskeletal: No swelling or deformity. R sided weakness   Lines/Devices:  Intact, no erythema, drainage or tenderness   Psych: Alert and oriented, normal mood affect given the setting       Data Review:     CBC:  No results for input(s): WBC, GRANS, MONOS, EOS, ANEU, ABL, HGB, HCT, PLT, HGBEXT, HCTEXT, PLTEXT in the last 72 hours. No lab exists for component: LYMPHS,  BARBY    BMP:  No results for input(s): CREA, BUN, NA, K, CL, CO2, AGAP, GLU in the last 72 hours. LFTS:  No results for input(s): TBILI, ALT, SGOT, AP, TP, ALB in the last 72 hours. Microbiology:     All Micro Results     Procedure Component Value Units Date/Time    CULTURE, Gosia Campos [[id number]] Collected:  18 1602    Order Status:  Completed Specimen:  Lumbar Updated:  18 9121     Special Requests: PENDING     GRAM STAIN PENDING     Culture result:         NO GROWTH AFTER SHORT PERIOD OF INCUBATION.  FURTHER RESULTS TO FOLLOW AFTER OVERNIGHT INCUBATION.     Tavia Wolf [[id number]] Collected:  06/22/18 1602    Order Status:  Completed Updated:  06/22/18 [id number]          Imaging:   n/a    Signed By: Rd Contreras NP     June 23, 2018

## 2018-06-23 NOTE — PROGRESS NOTES
Patient was calm with loving family at bedside  In good spirits  Humor  Assurance of prayers    Lili Mackenzie, staff [address] [facility]  /   [email or web address]

## 2018-06-24 PROCEDURE — 02HV33Z INSERTION OF INFUSION DEVICE INTO SUPERIOR VENA CAVA, PERCUTANEOUS APPROACH: ICD-10-PCS | Performed by: INTERNAL MEDICINE

## 2018-06-24 PROCEDURE — 74011250637 HC RX REV CODE- 250/637: Performed by: NEUROLOGICAL SURGERY

## 2018-06-24 PROCEDURE — 77030027138 HC INCENT SPIROMETER -A

## 2018-06-24 PROCEDURE — 77030018719 HC DRSG PTCH ANTIMIC J&J -A

## 2018-06-24 PROCEDURE — 97165 OT EVAL LOW COMPLEX 30 MIN: CPT

## 2018-06-24 PROCEDURE — 77030018846 HC SOL IRR STRL H20 ICUM -A

## 2018-06-24 PROCEDURE — 74011250636 HC RX REV CODE- 250/636: Performed by: NURSE PRACTITIONER

## 2018-06-24 PROCEDURE — 76937 US GUIDE VASCULAR ACCESS: CPT

## 2018-06-24 PROCEDURE — 97530 THERAPEUTIC ACTIVITIES: CPT

## 2018-06-24 PROCEDURE — 77030018786 HC NDL GD F/USND BARD -B

## 2018-06-24 PROCEDURE — C1751 CATH, INF, PER/CENT/MIDLINE: HCPCS

## 2018-06-24 PROCEDURE — 65270000029 HC RM PRIVATE

## 2018-06-24 PROCEDURE — 74011250636 HC RX REV CODE- 250/636: Performed by: INTERNAL MEDICINE

## 2018-06-24 RX ORDER — SODIUM CHLORIDE 0.9 % (FLUSH) 0.9 %
20 SYRINGE (ML) INJECTION AS NEEDED
Status: DISCONTINUED | OUTPATIENT
Start: 2018-06-24 | End: 2018-06-27 | Stop reason: HOSPADM

## 2018-06-24 RX ORDER — HEPARIN 100 UNIT/ML
600 SYRINGE INTRAVENOUS EVERY 8 HOURS
Status: DISCONTINUED | OUTPATIENT
Start: 2018-06-24 | End: 2018-06-27 | Stop reason: HOSPADM

## 2018-06-24 RX ORDER — SODIUM CHLORIDE 0.9 % (FLUSH) 0.9 %
20 SYRINGE (ML) INJECTION EVERY 8 HOURS
Status: DISCONTINUED | OUTPATIENT
Start: 2018-06-24 | End: 2018-06-27 | Stop reason: HOSPADM

## 2018-06-24 RX ORDER — HEPARIN 100 UNIT/ML
600 SYRINGE INTRAVENOUS AS NEEDED
Status: DISCONTINUED | OUTPATIENT
Start: 2018-06-24 | End: 2018-06-27 | Stop reason: HOSPADM

## 2018-06-24 RX ORDER — METOPROLOL SUCCINATE 100 MG/1
200 TABLET, EXTENDED RELEASE ORAL DAILY
Status: DISCONTINUED | OUTPATIENT
Start: 2018-06-25 | End: 2018-06-27 | Stop reason: HOSPADM

## 2018-06-24 RX ADMIN — SODIUM CHLORIDE, PRESERVATIVE FREE 600 UNITS: 5 INJECTION INTRAVENOUS at 17:52

## 2018-06-24 RX ADMIN — Medication 10 ML: at 06:38

## 2018-06-24 RX ADMIN — SODIUM CHLORIDE, PRESERVATIVE FREE 600 UNITS: 5 INJECTION INTRAVENOUS at 23:49

## 2018-06-24 RX ADMIN — Medication 20 ML: at 23:48

## 2018-06-24 RX ADMIN — ALPRAZOLAM 2 MG: 0.5 TABLET ORAL at 13:38

## 2018-06-24 RX ADMIN — PANTOPRAZOLE SODIUM 40 MG: 40 TABLET, DELAYED RELEASE ORAL at 06:26

## 2018-06-24 RX ADMIN — Medication 5 ML: at 16:06

## 2018-06-24 RX ADMIN — OXYCODONE HYDROCHLORIDE AND ACETAMINOPHEN 1 TABLET: 7.5; 325 TABLET ORAL at 23:54

## 2018-06-24 RX ADMIN — FUROSEMIDE 40 MG: 40 TABLET ORAL at 08:41

## 2018-06-24 RX ADMIN — VALSARTAN: 320 TABLET, FILM COATED ORAL at 13:38

## 2018-06-24 RX ADMIN — ZOLPIDEM TARTRATE 10 MG: 5 TABLET ORAL at 23:54

## 2018-06-24 RX ADMIN — NAPROXEN 500 MG: 250 TABLET ORAL at 08:41

## 2018-06-24 RX ADMIN — NAPROXEN 500 MG: 250 TABLET ORAL at 17:52

## 2018-06-24 RX ADMIN — ALPRAZOLAM 2 MG: 0.5 TABLET ORAL at 23:54

## 2018-06-24 RX ADMIN — MULTIPLE VITAMINS W/ MINERALS TAB 1 TABLET: TAB at 08:41

## 2018-06-24 RX ADMIN — Medication 2 G: at 23:49

## 2018-06-24 RX ADMIN — ATORVASTATIN CALCIUM 80 MG: 40 TABLET, FILM COATED ORAL at 08:41

## 2018-06-24 RX ADMIN — ALPRAZOLAM 1 MG: 0.5 TABLET ORAL at 06:22

## 2018-06-24 RX ADMIN — Medication 2 G: at 16:02

## 2018-06-24 RX ADMIN — OXYCODONE HYDROCHLORIDE AND ACETAMINOPHEN 1 TABLET: 7.5; 325 TABLET ORAL at 08:41

## 2018-06-24 RX ADMIN — OXYCODONE HYDROCHLORIDE AND ACETAMINOPHEN 1 TABLET: 7.5; 325 TABLET ORAL at 00:14

## 2018-06-24 RX ADMIN — METOPROLOL SUCCINATE 100 MG: 100 TABLET, EXTENDED RELEASE ORAL at 08:40

## 2018-06-24 RX ADMIN — ALPRAZOLAM 2 MG: 0.5 TABLET ORAL at 00:13

## 2018-06-24 NOTE — PROGRESS NOTES
1. Patient will complete functional mobility for household distances independently. - MET  4. Patient will complete functional transfers with MOD I and adaptive equipment as needed. Timeframe: 7 visits       OCCUPATIONAL THERAPY: Initial Assessment, Daily Note, Discharge, Treatment Day: 1st and AM 6/24/2018  INPATIENT: Hospital Day: 3  Payor: Boo Ramirez / Plan: [address] [place] [address] Avenue / Product Type: PPO /      NAME/AGE/GENDER: Catherine Sanchez is a 58 y.o. male   PRIMARY DIAGNOSIS:  Postoperative infection, subsequent encounter [T81. 4XXD] Postoperative wound infection Postoperative wound infection  Procedure(s) (LRB):  INCISION AND DRAINAGE OF LUMBAR WOUND (N/A)  2 Days Post-Op  ICD-10: Treatment Diagnosis:    · Generalized Muscle Weakness (M62.81)  · Other lack of cordination (R27.8)   Precautions/Allergies:     Beet      ASSESSMENT:     Mr. Luis Ruiz presents for the above. Upon arrival, pt supine in bed with RN in room. Pt is alert and oriented x 4 however presents with some confusion. Pt is very talkative and needs constant re-direction. Pt reports living with wife in 1-story home with 4 steps to enter and bilateral rails. Pt reports independence in all ADLs, IADLs, and functional mobility at baseline. Pt reports no access to DME. Pt able to perform bed mobility and supine to sit transfer with SBA today. Pt's BUE strength is WFL. Pt ambulated in hallway for 750 ft and no AE. Pt reports feeling weak in his legs and would like some exercises to complete to improve his strength. At this time, pt is functioning at baseline and no longer requires OT services. This section established at most recent assessment   PROBLEM LIST (Impairments causing functional limitations):  1. Decreased Strength  2. Decreased Ambulation Ability/Technique   INTERVENTIONS PLANNED: (Benefits and precautions of occupational therapy have been discussed with the patient.)  1.  None       TREATMENT PLAN: Frequency/Duration:   Rehabilitation Potential For Stated Goals:      RECOMMENDED REHABILITATION/EQUIPMENT: (at time of discharge pending progress): Due to the probability of continued deficits (see above) this patient will not likely need continued skilled occupational therapy after discharge. Equipment:    None at this time              OCCUPATIONAL PROFILE AND HISTORY:   History of Present Injury/Illness (Reason for Referral):  See H&P  Past Medical History/Comorbidities:   Mr. Milagros Leo  has a past medical history of Adverse effect of anesthesia; Anxiety; Arthritis; Edema; GERD (gastroesophageal reflux disease); Hypertension; Inguinal hernia; PUD (peptic ulcer disease) (40 yrs ago); Severe obesity (BMI 35.0-39. 9) with comorbidity ([address] (4/24/2018); Status post left hip replacement (10/14/2015); and Stroke [facility]) (2002). Mr. Milagros Leo  has a past surgical history that includes hx endoscopy; hx gi; hx carotid endarterectomy (Bilateral); hx appendectomy; hx urological; hx hernia repair; hx tonsillectomy; hx adenoidectomy; hx hip replacement; hx orthopaedic (Right); hx orthopaedic (Left); hx orthopaedic (Right); and hx orthopaedic (05/30/2018). Social History/Living Environment:   Home Environment: Private residence  # Steps to Enter: 4  Rails to Enter: Yes  Hand Rails : Bilateral  One/Two Story Residence: One story  Living Alone: No  Support Systems: Spouse/Significant Other/Partner  Patient Expects to be Discharged to[de-identified] Private residence  Current DME Used/Available at Home: None  Tub or Shower Type: Shower  Prior Level of Function/Work/Activity:  Independent in all ADLs, IADLs, and fx mobility. Personal Factors:          Sex:  male        Age:  58 y.o.         Other factors that influence how disability is experienced by the patient:  Multiple co-morbidities    Number of Personal Factors/Comorbidities that affect the Plan of Care: Brief history (0):  LOW COMPLEXITY   ASSESSMENT OF OCCUPATIONAL PERFORMANCE[de-identified]   Activities of Daily Living:   Basic ADLs (From Assessment) Complex ADLs (From Assessment)   Feeding: Independent  Oral Facial Hygiene/Grooming: Independent  Bathing: Minimum assistance  Upper Body Dressing: Independent  Lower Body Dressing: Independent  Toileting: Minimum assistance Instrumental ADL  Meal Preparation: Moderate assistance  Homemaking: Moderate assistance   Grooming/Bathing/Dressing Activities of Daily Living     Cognitive Retraining  Safety/Judgement: Home safety; Awareness of environment                       Bed/Mat Mobility  Rolling: Stand-by assistance  Supine to Sit: Stand-by assistance  Sit to Supine: Stand-by assistance  Sit to Stand: Stand-by assistance  Scooting: Stand-by assistance       Most Recent Physical Functioning:   Gross Assessment:  AROM: Within functional limits  Strength: Within functional limits  Coordination: Grossly decreased, non-functional  Sensation: Intact               Posture:  Posture (WDL): Exceptions to WDL  Posture Assessment: Cervical, Rounded shoulders  Balance:  Sitting: Intact  Standing: Impaired  Standing - Static: Good  Standing - Dynamic : Fair Bed Mobility:  Rolling: Stand-by assistance  Supine to Sit: Stand-by assistance  Sit to Supine: Stand-by assistance  Scooting: Stand-by assistance  Wheelchair Mobility:     Transfers:  Sit to Stand: Stand-by assistance  Stand to Sit: Modified independent            Patient Vitals for the past 6 hrs:   BP BP Patient Position SpO2 Pulse   06/24/18 0402 138/71 At rest 96 % 74       Mental Status  Neurologic State: Alert  Orientation Level: Oriented X4  Cognition: Follows commands  Perception: Appears intact  Perseveration: Perseverates during conversation  Safety/Judgement: Home safety, Awareness of environment                          Physical Skills Involved:  1. Strength Cognitive Skills Affected (resulting in the inability to perform in a timely and safe manner):  1. Short Term Recall  2. Sustained Attention  3.  Divided Attention Psychosocial Skills Affected:  1. Habits/Routines  2. Environmental Adaptation   Number of elements that affect the Plan of Care: 5+:  HIGH COMPLEXITY   CLINICAL DECISION MAKING:   [facility] AM-PAC 6 Clicks   Daily Activity Inpatient Short Form  How much help from another person does the patient currently need. .. Total A Lot A Little None   1. Putting on and taking off regular lower body clothing? [] 1   [] 2   [] 3   [x] 4   2. Bathing (including washing, rinsing, drying)? [] 1   [] 2   [x] 3   [] 4   3. Toileting, which includes using toilet, bedpan or urinal?   [] 1   [] 2   [x] 3   [] 4   4. Putting on and taking off regular upper body clothing? [] 1   [] 2   [] 3   [x] 4   5. Taking care of personal grooming such as brushing teeth? [] 1   [] 2   [] 3   [x] 4   6. Eating meals? [] 1   [] 2   [] 3   [x] 4   © 2007, Trustees of [facility], under license to [employer]. All rights reserved      Score:  Initial: 22 Most Recent: X (Date: -- )    Interpretation of Tool:  Represents activities that are increasingly more difficult (i.e. Bed mobility, Transfers, Gait). Score 24 23 22-20 19-15 14-10 9-7 6     Modifier CH CI CJ CK CL CM CN      ? Self Care:     - CURRENT STATUS: CJ - 20%-39% impaired, limited or restricted    - GOAL STATUS: CJ - 20%-39% impaired, limited or restricted    - D/C STATUS:  CJ - 20%-39% impaired, limited or restricted  Payor: Boo Ramirez / Plan: [address] [place] [address] / Product Type: PPO /      Medical Necessity:     ·   Reason for Services/Other Comments:  ·    Use of outcome tool(s) and clinical judgement create a POC that gives a: LOW COMPLEXITY         TREATMENT:   (In addition to Assessment/Re-Assessment sessions the following treatments were rendered)     Pre-treatment Symptoms/Complaints:  \" I want to get my leg strength back up. \"  Pain: Initial:   Pain Intensity 1: 6  Pain Location 1: Back  Pain Orientation 1: Lower  Pain Intervention(s) 1: Ambulation/Increased Activity  Post Session:  same     Therapeutic Activity: (    10 min): Therapeutic activities including Bed transfers, Ambulation on level ground to improve mobility, strength and balance. Required minimal   to promote dynamic balance in standing. Ambulated 750 ft in hallway independently.     Braces/Orthotics/Lines/Etc:   · O2 Device: Room air  Treatment/Session Assessment:    · Response to Treatment:  Good Participation  · Interdisciplinary Collaboration:   o Occupational Therapist  o Registered Nurse  · After treatment position/precautions:   o Supine in bed  o Bed alarm/tab alert on  o Bed/Chair-wheels locked  o Call light within reach   · Compliance with Program/Exercises:  · Recommendations/Intent for next treatment session:    Total Treatment Duration:  OT Patient Time In/Time Out  Time In: [address]  Time Out: [address] OT

## 2018-06-24 NOTE — ROUTINE PROCESS
Patient confused about amount of xanax dose. Nurse explained PTA / ordered diose. Patient requested 1 mg xanax.

## 2018-06-24 NOTE — PROGRESS NOTES
Problem: Mobility Impaired (Adult and Pediatric)  Goal: *Therapy Goal (Edit Goal, Insert Text)  STG:  (1.)Mr. Aditya Mims will move from supine to sit and sit to supine , scoot up and down and roll side to side with MODIFIED INDEPENDENCE within 4 treatment day(s). (2.)Mr. Aditya Mims will transfer from bed to chair and chair to bed with MODIFIED INDEPENDENCE using the least restrictive device within 4 treatment day(s). (3.)Mr. Aditya Mims will ambulate with MODIFIED INDEPENDENCE for 500 feet with the least restrictive device within 4 treatment day(s). LTG:  (1.)Mr. Aditya Mims will move from supine to sit and sit to supine , scoot up and down and roll side to side in bed with INDEPENDENT within 7 treatment day(s). (2.)Mr. Aditya Mims will transfer from bed to chair and chair to bed with INDEPENDENT using the least restrictive device within 7 treatment day(s). (3.)Mr. Aditya Mims will ambulate with INDEPENDENT for 1000 feet with the least restrictive device within 7 treatment day(s). ________________________________________________________________________________________________       PHYSICAL THERAPY: Daily Note, Treatment Day: 1st 6/24/2018  INPATIENT: Hospital Day: 3  Payor: Carol Ann Tatumer / Plan: [address] [place] [address] / Product Type: PPO /      NAME/AGE/GENDER: Sharon Bernheim is a 58 y.o. male   PRIMARY DIAGNOSIS: Postoperative infection, subsequent encounter [T81. 4XXD] Postoperative wound infection Postoperative wound infection  Procedure(s) (LRB):  INCISION AND DRAINAGE OF LUMBAR WOUND (N/A)  2 Days Post-Op  ICD-10: Treatment Diagnosis:    · Generalized Muscle Weakness (M62.81)  · Other lack of cordination (R27.8)  · Difficulty in walking, Not elsewhere classified (R26.2)  · Other abnormalities of gait and mobility (R26.89)   Precaution/Allergies:  Beet      ASSESSMENT:     Mr. Aditya Mims presents with decreased transfers, ambulation and mobility with above diagnosis.     He was supine in bed upon physical therapist entry to room, but was able to transfer to sitting and standing with supervision only. He ambulated 500 feet no AD within hallway with SBA (pt impulsive and confused so stand by assist needed for safety) before returning to sitting on bed in room. He then performed exercises listed below with therapist guidance for slow movement and was left in supine position with all needs met. Skilled PT is indicated for patient safety and mobility progression during current acute care episode. This section established at most recent assessment   PROBLEM LIST (Impairments causing functional limitations):  1. Decreased Strength  2. Decreased ADL/Functional Activities  3. Decreased Transfer Abilities  4. Decreased Ambulation Ability/Technique  5. Decreased Balance  6. Decreased Activity Tolerance  7. Decreased Pacing Skills  8. Decreased Flexibility/Joint Mobility  9. Decreased [place] with Home Exercise Program   INTERVENTIONS PLANNED: (Benefits and precautions of physical therapy have been discussed with the patient.)  1. Balance Exercise  2. Bed Mobility  3. Family Education  4. Gait Training  5. Home Exercise Program (HEP)  6. Range of Motion (ROM)  7. Therapeutic Activites  8. Therapeutic Exercise/Strengthening  9. Transfer Training     TREATMENT PLAN: Frequency/Duration: daily for duration of hospital stay  Rehabilitation Potential For Stated Goals: Good     RECOMMENDED REHABILITATION/EQUIPMENT: (at time of discharge pending progress): Due to the probability of continued deficits (see above) this patient will likely need continued skilled physical therapy after discharge. Equipment:    None at this time              HISTORY:   History of Present Injury/Illness (Reason for Referral):  Patient is post operative for lumbar incision revision secondary to prior post operative infection. Past Medical History/Comorbidities:   Mr. Melodie Macario  has a past medical history of Adverse effect of anesthesia;  Anxiety; Arthritis; Edema; GERD (gastroesophageal reflux disease); Hypertension; Inguinal hernia; PUD (peptic ulcer disease) (40 yrs ago); Severe obesity (BMI 35.0-39. 9) with comorbidity ([address] (4/24/2018); Status post left hip replacement (10/14/2015); and Stroke [facility]) (2002). Mr. Rosa zAar  has a past surgical history that includes hx endoscopy; hx gi; hx carotid endarterectomy (Bilateral); hx appendectomy; hx urological; hx hernia repair; hx tonsillectomy; hx adenoidectomy; hx hip replacement; hx orthopaedic (Right); hx orthopaedic (Left); hx orthopaedic (Right); and hx orthopaedic (05/30/2018). Social History/Living Environment:   Home Environment: Private residence  # Steps to Enter: 4  Rails to Enter: Yes  Hand Rails : Bilateral  One/Two Story Residence: One story  Living Alone: No  Support Systems: Spouse/Significant Other/Partner  Patient Expects to be Discharged to[de-identified] Private residence  Current DME Used/Available at Home: None  Tub or Shower Type: Shower  Prior Level of Function/Work/Activity:  Had been independent with all functional mobility. Dominant Side:         RIGHT  Personal Factors:          Sex:  male        Age:  58 y.o. Number of Personal Factors/Comorbidities that affect the Plan of Care: 0: LOW COMPLEXITY   EXAMINATION:   Most Recent Physical Functioning:   Gross Assessment:                  Posture:     Balance:  Sitting: Intact  Standing: Impaired  Standing - Static: Good  Standing - Dynamic : Fair Bed Mobility:  Rolling: Stand-by assistance  Supine to Sit: Stand-by assistance  Sit to Supine: Stand-by assistance  Scooting: Stand-by assistance  Wheelchair Mobility:     Transfers:  Sit to Stand: Stand-by assistance  Stand to Sit: Modified independent  Gait:     Base of Support: Widened  Speed/Rukhsana: Fluctuations  Step Length: Left shortened;Right shortened  Gait Abnormalities: Altered arm swing;Decreased step clearance;Trunk sway increased; Shuffling gait (decreased step clearance at R LE)  Distance (ft): 500 Feet (ft) (within hallway)  Assistive Device:  (none)  Ambulation - Level of Assistance: Supervision  Interventions: Safety awareness training;Verbal cues (cues for avoiding fast walking)      Body Structures Involved:  1. Joints  2. Muscles  3. Ligaments Body Functions Affected:  1. Neuromusculoskeletal  2. Movement Related  3. Skin Related  4. Metobolic/Endocrine Activities and Participation Affected:  1. General Tasks and Demands  2. Communication  3. Mobility  4. Community, Social and [place]   Number of elements that affect the Plan of Care: 1-2: LOW COMPLEXITY   CLINICAL PRESENTATION:   Presentation: Stable and uncomplicated: LOW COMPLEXITY   CLINICAL DECISION MAKIN [facility] Inpatient Short Form  How much difficulty does the patient currently have. .. Unable A Lot A Little None   1. Turning over in bed (including adjusting bedclothes, sheets and blankets)? [] 1   [] 2   [x] 3   [] 4   2. Sitting down on and standing up from a chair with arms ( e.g., wheelchair, bedside commode, etc.)   [] 1   [] 2   [x] 3   [] 4   3. Moving from lying on back to sitting on the side of the bed? [] 1   [] 2   [x] 3   [] 4   How much help from another person does the patient currently need. .. Total A Lot A Little None   4. Moving to and from a bed to a chair (including a wheelchair)? [] 1   [] 2   [x] 3   [] 4   5. Need to walk in hospital room? [] 1   [] 2   [x] 3   [] 4   6. Climbing 3-5 steps with a railing? [] 1   [] 2   [x] 3   [] 4   © , Trustees of [facility], under license to [employer]. All rights reserved      Score:  Initial: 18 Most Recent: X (Date: -- )    Interpretation of Tool:  Represents activities that are increasingly more difficult (i.e. Bed mobility, Transfers, Gait). Score 24 23 22-20 19-15 14-10 9-7 6     Modifier CH CI CJ CK CL CM CN      ?  Mobility - Walking and Moving Around:     - CURRENT STATUS: CK - 40%-59% impaired, limited or restricted    - GOAL STATUS: CJ - 20%-39% impaired, limited or restricted    - D/C STATUS:  ---------------To be determined---------------  Payor: HARLEY / Plan: [address] [place] [address] Avenue / Product Type: PPO /      Medical Necessity:     · Patient demonstrates good rehab potential due to higher previous functional level. Reason for Services/Other Comments:  · Patient continues to require skilled intervention due to medical complications, patient unable to attend/participate in therapy as expected and secondary surgery to clear infection. Use of outcome tool(s) and clinical judgement create a POC that gives a: Clear prediction of patient's progress: LOW COMPLEXITY            TREATMENT:   (In addition to Assessment/Re-Assessment sessions the following treatments were rendered)   Pre-treatment Symptoms/Complaints:  0/10 no specific pain complaints. He is very confused  Pain: Initial:   Pain Intensity 1:  (no pain verbalized)  Post Session:  0/10 no specific pain complaints. Therapeutic Activity: (    15 mins): Therapeutic activities including Bed transfers, Chair transfers, Ambulation on level ground and dynamic safety cuing to improve mobility, strength, balance and dynamic movement of leg - bilateral to improve functional dynamic mobility. Required minimal Safety awareness training;Verbal cues (cues for avoiding fast walking) to promote coordination of bilateral, lower extremity(s).       Date:  6/24/2018 Date:   Date:     Activity/Exercise Parameters Parameters Parameters   Sitting LAQ 15 reps/1 set each LE; cues for slower movement and increased AROM     Sitting marching 15 reps/1 set each LE     Supine hip abduction/adduction 20 reps/1 set B LEs     Supine heel slides 20 reps/1 set each LEs     Supine SLR 10 reps/1 set each LE; cues to maintain knee extension throughout                   Braces/Orthotics/Lines/Etc:   · O2 Device: Room air  Treatment/Session Assessment:    · Response to Treatment:  Pt was able to ambulate further distance with only stand by assistance, but continues to be very confused and impulsive which is decreasing his safety. · Interdisciplinary Collaboration:   o Physical Therapist  o Registered Nurse  · After treatment position/precautions:   o Supine in bed  o Bed alarm/tab alert on  o Bed/Chair-wheels locked  o Bed in low position  o Call light within reach  o Family at bedside   · Compliance with Program/Exercises: Will assess as treatment progresses. · Recommendations/Intent for next treatment session: \"Next visit will focus on advancements to more challenging activities, reduction in assistance provided and transfers, ambulation and mobility with increased safety. \".   Total Treatment Duration:  PT Patient Time In/Time Out  Time In: 1042  Time Out: [address], MANUEL

## 2018-06-24 NOTE — PROGRESS NOTES
Problem: Falls - Risk of  Goal: *Absence of Falls  Document Toni Fall Risk and appropriate interventions in the flowsheet. Outcome: Progressing Towards Goal  Fall Risk Interventions:  Mobility Interventions: Bed/chair exit alarm    Mentation Interventions: Bed/chair exit alarm    Medication Interventions: Bed/chair exit alarm    Elimination Interventions: Call light in reach, Elevated toilet seat    History of Falls Interventions: Bed/chair exit alarm        Problem: Pressure Injury - Risk of  Goal: *Prevention of pressure injury  Document Barrera Scale and appropriate interventions in the flowsheet.    Outcome: Progressing Towards Goal  Pressure Injury Interventions:  Sensory Interventions: Assess changes in LOC    Moisture Interventions: Absorbent underpads    Activity Interventions: Increase time out of bed    Mobility Interventions: PT/OT evaluation    Nutrition Interventions: Document food/fluid/supplement intake

## 2018-06-24 NOTE — PROGRESS NOTES
PICC Placement Note    PRE-PROCEDURE VERIFICATION  Correct Procedure: yes. Time out completed with assistant Agustin Kemp RN and all persons present in agreement with time out. Correct Site:  yes  Temperature: Temp: 98.3 °F (36.8 °C), Temperature Source: Temp Source: Oral  No results for input(s): BUN, CREA, PLT, INR, WBC, PLTEXT in the last 72 hours. No lab exists for component: APTHR, INREXT  Allergies: Beet  Education materials for [facility] Care given to patient or family. PROCEDURE DETAIL  A double lumen PICC line was started for antibiotic therapy. The following documentation is in addition to the PICC properties in the lines/airways flowsheet :  Lot #: [id number]  xylocaine used: yes  Mid-Arm Circumference: 36 (cm)  Internal Catheter Length: 42 (cm)  Internal Catheter Total Length: 42 (cm)  Vein Selection for PICC:right basilic  Central Line Bundle followed yes  Complication Related to Insertion: none  Both the insertion guidewire and sherlock guidewire were removed intact all ports have positive blood return and were flush well with normal saline. The placement was verified by sapiens ecg. The ECG results state the tip overlies the lower superior vena cava.          Line is okay to use: yes    Vickie Powers RN

## 2018-06-24 NOTE — PROGRESS NOTES
Admit Date: 6/22/2018     NS  POD 2    Afeb. VSS. No new complaints. Ambulatory. Has lost third IV; PICC line pending for today. Wound is packed. No surrounding erythema, no discharge. A: s/p I&D of lumbar wound. On Ancef for now. P: PICC line. Mechanical DVT prophy. Restarted/modified two BP meds based on home rx bottles supplied by RN.

## 2018-06-25 PROCEDURE — 74011250636 HC RX REV CODE- 250/636: Performed by: INTERNAL MEDICINE

## 2018-06-25 PROCEDURE — 74011250637 HC RX REV CODE- 250/637: Performed by: NEUROLOGICAL SURGERY

## 2018-06-25 PROCEDURE — 65270000029 HC RM PRIVATE

## 2018-06-25 PROCEDURE — 74750000023 HC WOUND THERAPY

## 2018-06-25 PROCEDURE — 77030019952 HC CANSTR VAC ASST KCON -B

## 2018-06-25 PROCEDURE — 74011250636 HC RX REV CODE- 250/636: Performed by: NURSE PRACTITIONER

## 2018-06-25 PROCEDURE — 97530 THERAPEUTIC ACTIVITIES: CPT

## 2018-06-25 PROCEDURE — 77030018836 HC SOL IRR NACL ICUM -A

## 2018-06-25 PROCEDURE — 97605 NEG PRS WND THER DME<=50SQCM: CPT

## 2018-06-25 PROCEDURE — 77030019934 HC DRSG VAC ASST KCON -B

## 2018-06-25 RX ADMIN — Medication 2 G: at 15:41

## 2018-06-25 RX ADMIN — ATORVASTATIN CALCIUM 80 MG: 40 TABLET, FILM COATED ORAL at 09:44

## 2018-06-25 RX ADMIN — PANTOPRAZOLE SODIUM 40 MG: 40 TABLET, DELAYED RELEASE ORAL at 04:29

## 2018-06-25 RX ADMIN — NAPROXEN 500 MG: 250 TABLET ORAL at 09:43

## 2018-06-25 RX ADMIN — SODIUM CHLORIDE, PRESERVATIVE FREE 600 UNITS: 5 INJECTION INTRAVENOUS at 04:30

## 2018-06-25 RX ADMIN — SODIUM CHLORIDE, PRESERVATIVE FREE 600 UNITS: 5 INJECTION INTRAVENOUS at 22:31

## 2018-06-25 RX ADMIN — SODIUM CHLORIDE, PRESERVATIVE FREE 600 UNITS: 5 INJECTION INTRAVENOUS at 14:25

## 2018-06-25 RX ADMIN — FUROSEMIDE 40 MG: 40 TABLET ORAL at 09:43

## 2018-06-25 RX ADMIN — Medication 2 G: at 09:55

## 2018-06-25 RX ADMIN — OXYCODONE HYDROCHLORIDE AND ACETAMINOPHEN 1 TABLET: 7.5; 325 TABLET ORAL at 15:34

## 2018-06-25 RX ADMIN — NAPROXEN 500 MG: 250 TABLET ORAL at 17:59

## 2018-06-25 RX ADMIN — ALPRAZOLAM 2 MG: 0.5 TABLET ORAL at 15:34

## 2018-06-25 RX ADMIN — ALPRAZOLAM 2 MG: 0.5 TABLET ORAL at 04:29

## 2018-06-25 RX ADMIN — ALPRAZOLAM 2 MG: 0.5 TABLET ORAL at 09:45

## 2018-06-25 RX ADMIN — Medication 20 ML: at 04:30

## 2018-06-25 RX ADMIN — VALSARTAN: 320 TABLET, FILM COATED ORAL at 09:43

## 2018-06-25 RX ADMIN — Medication 20 ML: at 22:31

## 2018-06-25 RX ADMIN — MULTIPLE VITAMINS W/ MINERALS TAB 1 TABLET: TAB at 09:44

## 2018-06-25 RX ADMIN — Medication 20 ML: at 14:41

## 2018-06-25 RX ADMIN — Medication 2 G: at 22:34

## 2018-06-25 RX ADMIN — ALPRAZOLAM 2 MG: 0.5 TABLET ORAL at 22:29

## 2018-06-25 RX ADMIN — METOPROLOL SUCCINATE 200 MG: 100 TABLET, EXTENDED RELEASE ORAL at 09:43

## 2018-06-25 RX ADMIN — LUBIPROSTONE 8 MCG: 8 CAPSULE, GELATIN COATED ORAL at 09:54

## 2018-06-25 RX ADMIN — OXYCODONE HYDROCHLORIDE AND ACETAMINOPHEN 1 TABLET: 7.5; 325 TABLET ORAL at 22:29

## 2018-06-25 RX ADMIN — ZOLPIDEM TARTRATE 10 MG: 5 TABLET ORAL at 22:29

## 2018-06-25 RX ADMIN — OXYCODONE HYDROCHLORIDE AND ACETAMINOPHEN 1 TABLET: 7.5; 325 TABLET ORAL at 09:45

## 2018-06-25 NOTE — PROGRESS NOTES
Problem: Mobility Impaired (Adult and Pediatric)  Goal: *Therapy Goal (Edit Goal, Insert Text)  STG:  (1.)Mr. Fiorella Blake will move from supine to sit and sit to supine , scoot up and down and roll side to side with MODIFIED INDEPENDENCE within 4 treatment day(s). (2.)Mr. Fiorella Blake will transfer from bed to chair and chair to bed with MODIFIED INDEPENDENCE using the least restrictive device within 4 treatment day(s). (3.)Mr. Fiorella Blake will ambulate with MODIFIED INDEPENDENCE for 500 feet with the least restrictive device within 4 treatment day(s). (Met 6/25/18)    LTG:  (1.)Mr. Fiorella Blake will move from supine to sit and sit to supine , scoot up and down and roll side to side in bed with INDEPENDENT within 7 treatment day(s). (2.)Mr. Fiorella Blake will transfer from bed to chair and chair to bed with INDEPENDENT using the least restrictive device within 7 treatment day(s). (3.)Mr. Fiorella Blake will ambulate with INDEPENDENT for 1000 feet with the least restrictive device within 7 treatment day(s). ________________________________________________________________________________________________       PHYSICAL THERAPY: Daily Note, Treatment Day: 2nd 6/25/2018  INPATIENT: Hospital Day: 4  Payor: Nickie Vora / Plan: [address] [place] [address] / Product Type: PPO /      NAME/AGE/GENDER: Cristhian Rodriguez is a 58 y.o. male   PRIMARY DIAGNOSIS: Postoperative infection, subsequent encounter [T81. 4XXD] Postoperative wound infection Postoperative wound infection  Procedure(s) (LRB):  INCISION AND DRAINAGE OF LUMBAR WOUND (N/A)  3 Days Post-Op  ICD-10: Treatment Diagnosis:    · Generalized Muscle Weakness (M62.81)  · Other lack of cordination (R27.8)  · Difficulty in walking, Not elsewhere classified (R26.2)  · Other abnormalities of gait and mobility (R26.89)   Precaution/Allergies:  Beet      ASSESSMENT:     Mr. Fiorella Blake presents with decreased transfers, ambulation and mobility with above diagnosis.     He was seen in [facility] ambulating with visitor earlier and up in room ambulating on contact. He ambulated 500 feet with no AD within hallway Independently with fluctuating speed, talking and turning around looking behind him most of the time . He also ascended and descended 20 steps with supervision. Skilled PT is indicated for patient safety and mobility progression during current acute care episode. This section established at most recent assessment   PROBLEM LIST (Impairments causing functional limitations):  1. Decreased Strength  2. Decreased ADL/Functional Activities  3. Decreased Transfer Abilities  4. Decreased Ambulation Ability/Technique  5. Decreased Balance  6. Decreased Activity Tolerance  7. Decreased Pacing Skills  8. Decreased Flexibility/Joint Mobility  9. Decreased [place] with Home Exercise Program   INTERVENTIONS PLANNED: (Benefits and precautions of physical therapy have been discussed with the patient.)  1. Balance Exercise  2. Bed Mobility  3. Family Education  4. Gait Training  5. Home Exercise Program (HEP)  6. Range of Motion (ROM)  7. Therapeutic Activites  8. Therapeutic Exercise/Strengthening  9. Transfer Training     TREATMENT PLAN: Frequency/Duration: daily for duration of hospital stay  Rehabilitation Potential For Stated Goals: Good     RECOMMENDED REHABILITATION/EQUIPMENT: (at time of discharge pending progress): Due to the probability of continued deficits (see above) this patient will likely need continued skilled physical therapy after discharge. Equipment:    None at this time              HISTORY:   History of Present Injury/Illness (Reason for Referral):  Patient is post operative for lumbar incision revision secondary to prior post operative infection. Past Medical History/Comorbidities:   Mr. Laurie Ward  has a past medical history of Adverse effect of anesthesia; Anxiety; Arthritis; Edema; GERD (gastroesophageal reflux disease); Hypertension;  Inguinal hernia; PUD (peptic ulcer disease) (40 yrs ago); Severe obesity (BMI 35.0-39. 9) with comorbidity ([address] (4/24/2018); Status post left hip replacement (10/14/2015); and Stroke [facility]) (2002). Mr. Fiorella Blake  has a past surgical history that includes hx endoscopy; hx gi; hx carotid endarterectomy (Bilateral); hx appendectomy; hx urological; hx hernia repair; hx tonsillectomy; hx adenoidectomy; hx hip replacement; hx orthopaedic (Right); hx orthopaedic (Left); hx orthopaedic (Right); and hx orthopaedic (05/30/2018). Social History/Living Environment:   Home Environment: Private residence  # Steps to Enter: 4  Rails to Enter: Yes  Hand Rails : Bilateral  One/Two Story Residence: One story  Living Alone: No  Support Systems: Spouse/Significant Other/Partner  Patient Expects to be Discharged to[de-identified] Private residence  Current DME Used/Available at Home: None  Tub or Shower Type: Shower  Prior Level of Function/Work/Activity:  Had been independent with all functional mobility. Dominant Side:         RIGHT  Personal Factors:          Sex:  male        Age:  58 y.o. Number of Personal Factors/Comorbidities that affect the Plan of Care: 0: LOW COMPLEXITY   EXAMINATION:   Most Recent Physical Functioning:   Gross Assessment:                  Posture:     Balance:  Sitting: Intact  Standing: Intact  Standing - Static: Good  Standing - Dynamic : Good Bed Mobility:     Wheelchair Mobility:     Transfers:     Gait:     Base of Support: Widened  Speed/Rukhsana: Fluctuations  Step Length: Left shortened;Right shortened  Gait Abnormalities: Altered arm swing;Decreased step clearance;Trunk sway increased  Distance (ft): 500 Feet (ft)  Assistive Device:  (none)  Ambulation - Level of Assistance: Independent  Number of Stairs Trained: 20  Stairs - Level of Assistance: Supervision  Rail Use: Both  Interventions: Safety awareness training      Body Structures Involved:  1. Joints  2. Muscles  3.  Ligaments Body Functions Affected:  1. Neuromusculoskeletal  2. Movement Related  3. Skin Related  4. Metobolic/Endocrine Activities and Participation Affected:  1. General Tasks and Demands  2. Communication  3. Mobility  4. Community, Social and [place]   Number of elements that affect the Plan of Care: 1-2: LOW COMPLEXITY   CLINICAL PRESENTATION:   Presentation: Stable and uncomplicated: LOW COMPLEXITY   CLINICAL DECISION MAKIN [facility] Mobility Inpatient Short Form  How much difficulty does the patient currently have. .. Unable A Lot A Little None   1. Turning over in bed (including adjusting bedclothes, sheets and blankets)? [] 1   [] 2   [x] 3   [] 4   2. Sitting down on and standing up from a chair with arms ( e.g., wheelchair, bedside commode, etc.)   [] 1   [] 2   [x] 3   [] 4   3. Moving from lying on back to sitting on the side of the bed? [] 1   [] 2   [x] 3   [] 4   How much help from another person does the patient currently need. .. Total A Lot A Little None   4. Moving to and from a bed to a chair (including a wheelchair)? [] 1   [] 2   [x] 3   [] 4   5. Need to walk in hospital room? [] 1   [] 2   [x] 3   [] 4   6. Climbing 3-5 steps with a railing? [] 1   [] 2   [x] 3   [] 4   © , Trustees of [facility] [employer], under license to [employer]. All rights reserved      Score:  Initial: 18 Most Recent: X (Date: -- )    Interpretation of Tool:  Represents activities that are increasingly more difficult (i.e. Bed mobility, Transfers, Gait). Score 24 23 22-20 19-15 14-10 9-7 6     Modifier CH CI CJ CK CL CM CN      ?  Mobility - Walking and Moving Around:     - CURRENT STATUS: CK - 40%-59% impaired, limited or restricted    - GOAL STATUS: CJ - 20%-39% impaired, limited or restricted    - D/C STATUS:  ---------------To be determined---------------  Payor: HARLEY / Plan: Yaron Iverson / Product Type: PPO /      Medical Necessity:     · Patient demonstrates good rehab potential due to higher previous functional level. Reason for Services/Other Comments:  · Patient continues to require skilled intervention due to medical complications, patient unable to attend/participate in therapy as expected and secondary surgery to clear infection. Use of outcome tool(s) and clinical judgement create a POC that gives a: Clear prediction of patient's progress: LOW COMPLEXITY            TREATMENT:   (In addition to Assessment/Re-Assessment sessions the following treatments were rendered)   Pre-treatment Symptoms/Complaints:  0/10 no specific pain complaints. Pain: Initial:      Post Session:  0/10 no specific pain complaints. Therapeutic Activity: (    24 mins): Therapeutic activities including Chair transfers, Ambulation on level ground, stair training and dynamic safety cuing to improve mobility, strength, balance and dynamic movement of leg - bilateral to improve functional dynamic mobility. Required minimal Safety awareness training to promote coordination of bilateral, lower extremity(s). Date:  6/24/2018 Date:   Date:     Activity/Exercise Parameters Parameters Parameters   Sitting LAQ 15 reps/1 set each LE; cues for slower movement and increased AROM     Sitting marching 15 reps/1 set each LE     Supine hip abduction/adduction 20 reps/1 set B LEs     Supine heel slides 20 reps/1 set each LEs     Supine SLR 10 reps/1 set each LE; cues to maintain knee extension throughout                   Braces/Orthotics/Lines/Etc:   · O2 Device: Room air  Treatment/Session Assessment:    · Response to Treatment:  See above  · Interdisciplinary Collaboration:   o Physical Therapy Assistant  o Registered Nurse  · After treatment position/precautions:   o Bed in low position  o Call light within reach  o Family at bedside  o pt up in room with vistor (?spouse)   · Compliance with Program/Exercises: Will assess as treatment progresses.   · Recommendations/Intent for next treatment session: \"Next visit will focus on advancements to more challenging activities, reduction in assistance provided and transfers, ambulation and mobility with increased safety. \".   Total Treatment Duration:  PT Patient Time In/Time Out  Time In: 1038  Time Out: 310 [facility], hospitals

## 2018-06-25 NOTE — PROGRESS NOTES
Patient is expected to have IV antibiotics for 6 weeks, and will need [facility] services following discharge for PT / RN for PICC care and lab work as well as wound care. Patient is in agreement with [facility] for these services. Patient will also need a wound vac. Wound vac papers were filled out, and faxed to Dr. Ashlyn Lamar to review. CM contacted Carmen Alvarez with [facility] regarding home infusion for IV antibiotics. Carmen Alvarez agreed to review information and run patient's insurance to determine cost to patient. She anticipates 100% coverage due to patient having [employer]. Carmen Alvarez will need final orders for IV antibiotics for home infusions through [facility]. Wound Vac has not yet been submitted, awaiting physician review. [facility] ordered for RN (wound care, picc care, labs) and PT. Order, referral, and facetoface note completed on this date.

## 2018-06-25 NOTE — PROGRESS NOTES
Problem: Falls - Risk of  Goal: *Absence of Falls  Document Toni Fall Risk and appropriate interventions in the flowsheet. Outcome: Progressing Towards Goal  Fall Risk Interventions:  Mobility Interventions: Bed/chair exit alarm, Patient to call before getting OOB    Mentation Interventions: Bed/chair exit alarm, Evaluate medications/consider consulting pharmacy    Medication Interventions: Bed/chair exit alarm, Evaluate medications/consider consulting pharmacy    Elimination Interventions: Call light in reach, Patient to call for help with toileting needs    History of Falls Interventions: Evaluate medications/consider consulting pharmacy        Problem: Pressure Injury - Risk of  Goal: *Prevention of pressure injury  Document Barrera Scale and appropriate interventions in the flowsheet.    Outcome: Progressing Towards Goal  Pressure Injury Interventions:  Sensory Interventions: Assess changes in LOC    Moisture Interventions: Absorbent underpads    Activity Interventions: Increase time out of bed, Pressure redistribution bed/mattress(bed type), PT/OT evaluation    Mobility Interventions: HOB 30 degrees or less, Pressure redistribution bed/mattress (bed type), PT/OT evaluation    Nutrition Interventions: Document food/fluid/supplement intake

## 2018-06-25 NOTE — PROGRESS NOTES
NEUROSURGERY    POD #3  S/P I & D LUMBAR WOUND  CULTURES PENDING/PRELIM GRAM +COCCI  AMBULATING AND WORKING WELL WITH PI  LUMBAR DRESSING C/D/I--PACKED WITH KERLIX  NO FEVERS    A/P  AWAIT FINAL CULTURES, ANCEF FOR NOW  CONTINUE PT    BRANDAN MULLIGANP/DR. Salo Franco

## 2018-06-25 NOTE — WOUND CARE
Mid lower lumbar 8x4x8.5cm, bone and muscle exposed, slough over some areas undermined form 12-4 o'clock of 3 cm, undermined from 6-9 o'clock 2cm, cleansed with NS wound vac foam x3 placed in wound, to be changed 3 times per week. Will monitor.

## 2018-06-25 NOTE — PROGRESS NOTES
[facility]  Face to Face Encounter    Patients Name: José Holbrook    YOB: 1955    Ordering Physician: Dr. Calli Marr    Primary Diagnosis: Postoperative infection, subsequent encounter [T81. 4XXD]    Date of Face to Face:   6/25/2018                                  Face to Face Encounter findings are related to primary reason for home care:   yes. 1. I certify that the patient needs intermittent care as follows: skilled nursing care:  skilled observation/assessment, patient education, PICC care and antibiotic administration and wound care  physical therapy: strengthening, stretching/ROM, transfer training, gait/stair training, balance training and pt/caregiver education    2. I certify that this patient is homebound, that is: 1) patient requires the use of a cane device, special transportation, or assistance of another to leave the home; or 2) patient's condition makes leaving the home medically contraindicated; and 3) patient has a normal inability to leave the home and leaving the home requires considerable and taxing effort. Patient may leave the home for infrequent and short duration for medical reasons, and occasional absences for non-medical reasons. Homebound status is due to the following functional limitations: Patient with strength deficits limiting the performance of all ADL's without caregiver assistance or the use of an assistive device. Patient with poor safety awareness and is at risk for falls without assistance of another person and the use of an assistive device. Patient with poor ambulation endurance limiting their safe ability to ascend/descend the required number of steps to leave the home.     3. I certify that this patient is under my care and that I, or a nurse practitioner or [phone], or clinical nurse specialist, or certified nurse midwife, working with me, had a Face-to-Face Encounter that meets the physician Face-to-Face Encounter requirements. The following are the clinical findings from the [address] [place] [address] encounter that support the need for skilled services and is a summary of the encounter: see hospital chart. See hospital chart. Viraj Diego  6/25/2018      THE FOLLOWING TO BE COMPLETED BY THE COMMUNITY PHYSICIAN:    I concur with the findings described above from the F2F encounter that this patient is homebound and in need of a skilled service.     Certifying Physician: _____________________________________      Printed Certifying Physician Name: _____________________________________    Date: _________________

## 2018-06-26 LAB
ALBUMIN SERPL-MCNC: 2.8 G/DL (ref 3.2–4.6)
ALBUMIN/GLOB SERPL: 0.7 {RATIO} (ref 1.2–3.5)
ALP SERPL-CCNC: 84 U/L (ref 50–136)
ALT SERPL-CCNC: 24 U/L (ref 12–65)
ANION GAP SERPL CALC-SCNC: 11 MMOL/L (ref 7–16)
AST SERPL-CCNC: 27 U/L (ref 15–37)
BACTERIA SPEC CULT: ABNORMAL
BASOPHILS # BLD: 0.1 K/UL (ref 0–0.2)
BASOPHILS NFR BLD: 1 % (ref 0–2)
BILIRUB SERPL-MCNC: 0.2 MG/DL (ref 0.2–1.1)
BUN SERPL-MCNC: 47 MG/DL (ref 8–23)
CALCIUM SERPL-MCNC: 8.7 MG/DL (ref 8.3–10.4)
CHLORIDE SERPL-SCNC: 106 MMOL/L (ref 98–107)
CO2 SERPL-SCNC: 26 MMOL/L (ref 21–32)
CREAT SERPL-MCNC: 1.75 MG/DL (ref 0.8–1.5)
CRP SERPL-MCNC: 5.7 MG/DL (ref 0–0.9)
DIFFERENTIAL METHOD BLD: ABNORMAL
EOSINOPHIL # BLD: 0.6 K/UL (ref 0–0.8)
EOSINOPHIL NFR BLD: 6 % (ref 0.5–7.8)
ERYTHROCYTE [DISTWIDTH] IN BLOOD BY AUTOMATED COUNT: 15.4 % (ref 11.9–14.6)
ERYTHROCYTE [SEDIMENTATION RATE] IN BLOOD: 99 MM/HR (ref 0–20)
GLOBULIN SER CALC-MCNC: 4 G/DL (ref 2.3–3.5)
GLUCOSE SERPL-MCNC: 109 MG/DL (ref 65–100)
GRAM STN SPEC: ABNORMAL
GRAM STN SPEC: ABNORMAL
HCT VFR BLD AUTO: 31.5 % (ref 41.1–50.3)
HGB BLD-MCNC: 10.2 G/DL (ref 13.6–17.2)
IMM GRANULOCYTES # BLD: 0.1 K/UL (ref 0–0.5)
IMM GRANULOCYTES NFR BLD AUTO: 1 % (ref 0–5)
LYMPHOCYTES # BLD: 3.1 K/UL (ref 0.5–4.6)
LYMPHOCYTES NFR BLD: 31 % (ref 13–44)
MCH RBC QN AUTO: 30.7 PG (ref 26.1–32.9)
MCHC RBC AUTO-ENTMCNC: 32.4 G/DL (ref 31.4–35)
MCV RBC AUTO: 94.9 FL (ref 79.6–97.8)
MONOCYTES # BLD: 0.6 K/UL (ref 0.1–1.3)
MONOCYTES NFR BLD: 6 % (ref 4–12)
NEUTS SEG # BLD: 5.7 K/UL (ref 1.7–8.2)
NEUTS SEG NFR BLD: 55 % (ref 43–78)
PLATELET # BLD AUTO: 258 K/UL (ref 150–450)
PMV BLD AUTO: 9.4 FL (ref 10.8–14.1)
POTASSIUM SERPL-SCNC: 4 MMOL/L (ref 3.5–5.1)
PROT SERPL-MCNC: 6.8 G/DL (ref 6.3–8.2)
RBC # BLD AUTO: 3.32 M/UL (ref 4.23–5.67)
SERVICE CMNT-IMP: ABNORMAL
SODIUM SERPL-SCNC: 143 MMOL/L (ref 136–145)
WBC # BLD AUTO: 10.1 K/UL (ref 4.3–11.1)

## 2018-06-26 PROCEDURE — 97530 THERAPEUTIC ACTIVITIES: CPT

## 2018-06-26 PROCEDURE — 74011250636 HC RX REV CODE- 250/636: Performed by: INTERNAL MEDICINE

## 2018-06-26 PROCEDURE — 85025 COMPLETE CBC W/AUTO DIFF WBC: CPT | Performed by: NURSE PRACTITIONER

## 2018-06-26 PROCEDURE — 85652 RBC SED RATE AUTOMATED: CPT | Performed by: NURSE PRACTITIONER

## 2018-06-26 PROCEDURE — 74011250636 HC RX REV CODE- 250/636: Performed by: NURSE PRACTITIONER

## 2018-06-26 PROCEDURE — 36592 COLLECT BLOOD FROM PICC: CPT

## 2018-06-26 PROCEDURE — 65270000029 HC RM PRIVATE

## 2018-06-26 PROCEDURE — 77030020263 HC SOL INJ SOD CL0.9% LFCR 1000ML

## 2018-06-26 PROCEDURE — 74011250637 HC RX REV CODE- 250/637: Performed by: NEUROLOGICAL SURGERY

## 2018-06-26 PROCEDURE — 74750000023 HC WOUND THERAPY

## 2018-06-26 PROCEDURE — 80053 COMPREHEN METABOLIC PANEL: CPT | Performed by: NURSE PRACTITIONER

## 2018-06-26 PROCEDURE — 74011250636 HC RX REV CODE- 250/636: Performed by: NEUROLOGICAL SURGERY

## 2018-06-26 PROCEDURE — 86140 C-REACTIVE PROTEIN: CPT | Performed by: NURSE PRACTITIONER

## 2018-06-26 RX ADMIN — ALPRAZOLAM 2 MG: 0.5 TABLET ORAL at 02:41

## 2018-06-26 RX ADMIN — Medication 20 ML: at 20:29

## 2018-06-26 RX ADMIN — Medication 2 G: at 08:53

## 2018-06-26 RX ADMIN — OXYCODONE HYDROCHLORIDE AND ACETAMINOPHEN 1 TABLET: 7.5; 325 TABLET ORAL at 20:39

## 2018-06-26 RX ADMIN — LUBIPROSTONE 8 MCG: 8 CAPSULE, GELATIN COATED ORAL at 08:52

## 2018-06-26 RX ADMIN — MULTIPLE VITAMINS W/ MINERALS TAB 1 TABLET: TAB at 08:52

## 2018-06-26 RX ADMIN — Medication 20 ML: at 14:30

## 2018-06-26 RX ADMIN — FUROSEMIDE 40 MG: 40 TABLET ORAL at 08:53

## 2018-06-26 RX ADMIN — ALPRAZOLAM 2 MG: 0.5 TABLET ORAL at 16:12

## 2018-06-26 RX ADMIN — NAPROXEN 500 MG: 250 TABLET ORAL at 08:52

## 2018-06-26 RX ADMIN — Medication 2 G: at 16:12

## 2018-06-26 RX ADMIN — SODIUM CHLORIDE 500 ML: 900 INJECTION, SOLUTION INTRAVENOUS at 14:30

## 2018-06-26 RX ADMIN — SODIUM CHLORIDE, PRESERVATIVE FREE 600 UNITS: 5 INJECTION INTRAVENOUS at 20:29

## 2018-06-26 RX ADMIN — ALPRAZOLAM 2 MG: 0.5 TABLET ORAL at 09:01

## 2018-06-26 RX ADMIN — PANTOPRAZOLE SODIUM 40 MG: 40 TABLET, DELAYED RELEASE ORAL at 02:41

## 2018-06-26 RX ADMIN — Medication 20 ML: at 02:40

## 2018-06-26 RX ADMIN — OXYCODONE HYDROCHLORIDE AND ACETAMINOPHEN 1 TABLET: 7.5; 325 TABLET ORAL at 02:42

## 2018-06-26 RX ADMIN — SODIUM CHLORIDE, PRESERVATIVE FREE 300 UNITS: 5 INJECTION INTRAVENOUS at 16:12

## 2018-06-26 RX ADMIN — NAPROXEN 500 MG: 250 TABLET ORAL at 16:12

## 2018-06-26 RX ADMIN — ATORVASTATIN CALCIUM 80 MG: 40 TABLET, FILM COATED ORAL at 10:16

## 2018-06-26 RX ADMIN — ACETAMINOPHEN 650 MG: 325 TABLET ORAL at 16:13

## 2018-06-26 RX ADMIN — SODIUM CHLORIDE, PRESERVATIVE FREE 600 UNITS: 5 INJECTION INTRAVENOUS at 02:40

## 2018-06-26 RX ADMIN — METOPROLOL SUCCINATE 200 MG: 100 TABLET, EXTENDED RELEASE ORAL at 08:52

## 2018-06-26 NOTE — PROGRESS NOTES
Problem: Falls - Risk of  Goal: *Absence of Falls  Document Toni Fall Risk and appropriate interventions in the flowsheet. Outcome: Progressing Towards Goal  Fall Risk Interventions:  Mobility Interventions: Bed/chair exit alarm, Communicate number of staff needed for ambulation/transfer, PT Consult for assist device competence, PT Consult for mobility concerns    Mentation Interventions: Adequate sleep, hydration, pain control, Bed/chair exit alarm, Door open when patient unattended    Medication Interventions: Bed/chair exit alarm, Evaluate medications/consider consulting pharmacy, Patient to call before getting OOB, Teach patient to arise slowly    Elimination Interventions:  Toileting schedule/hourly rounds, Toilet paper/wipes in reach, Patient to call for help with toileting needs, Elevated toilet seat, Call light in reach, Bed/chair exit alarm    History of Falls Interventions: Bed/chair exit alarm, Consult care management for discharge planning, Door open when patient unattended

## 2018-06-26 NOTE — PROGRESS NOTES
NEUROSURGERY    POD #4  S/P I & D LUMBAR WOUND  CULTURES PENDING  I.D.FOLLOWING  WOUND VAC IN PLACE  NO FEVERS  MOBILIZING WITH PT    A/P  POSSIBLE D/C HOME TODAY  ENCOURAGE PO INTAKE, MOBILIZATION, PT  I.D. TO SEE FOR ABX RECOMMENDATIONS FOR HOME    THOR MULLIGAN/DR. Jez Addison

## 2018-06-26 NOTE — PROGRESS NOTES
Infectious Disease Consult    Today's Date: 6/26/2018   Admit Date: 6/22/2018    Impression:   · Postop lumbar wound infection s/p I/D (6/22/18), op culture with Ox-S staph lugdunensis. Wound swab with staph lugdunensis  · S/p bilateral laminectomy with medial fasciotomy (L4-S1) (5/30/18)  · Hx of L JABIER and CVA (speech and R sided deficits)  · Chronic pain    Plan:   · Continue cefazolin 2g IV Q 8 hrs to complete six week course; operative note not yet available. ID Plan of care:   Routine PICC care  Continue cefazolin 2g IV Q 8hrs through 8/3/18  Q Monday labs:   CBC with diff, serum creatinine, LFTs, ESR, inflammatory CRP  Please fax labs to [phone], [facility]  Follow up with ID at mid-therapy and EOT: 7/17 at 2:20 pm and 8/3/18 at 10 am    No baseline labs this hospitalization - will check prior to discharge. Anti-infectives:     Cefazolin (6/23/18 -    Interval History:   Denies nausea, vomiting, diarrhea, fevers, chills or sweats. Has some back pain, denies weakness. Patient Active Problem List   Diagnosis Code    Bilateral lower extremity edema R60.0    Esophageal reflux K21.9    Esophageal stricture K22.2    H/O: CVA (cerebrovascular accident) Z86.73    Bilateral carotid artery stenosis I65.23    Hyperlipidemia E78.5    Claudication (HCC) I73.9    Gynecomastia, male N58    Status post left hip replacement Z96.642    Anxiety F41.9    Benign essential HTN I10    Chronic pain syndrome G89.4    Severe obesity (BMI 35.0-39. 9) with comorbidity ([facility] E66.01    Lumbar stenosis with neurogenic claudication M48.062    Postoperative wound infection T81. 4XXA     Past Medical History:   Diagnosis Date    Adverse effect of anesthesia     wife states had delayed awaken    Anxiety     takes alprazolam daily    Arthritis     Edema     BLE; takes spironolactone daily    GERD (gastroesophageal reflux disease)     managed w/med    Hypertension     managed w/med    Inguinal hernia left per pt    PUD (peptic ulcer disease) 40 yrs ago    pt states no current issues    Severe obesity (BMI 35.0-39. 9) with comorbidity ([address] 4/24/2018    Status post left hip replacement 10/14/2015    Stroke [facility]) 2002    pt states still limps w/right leg, has problems with speech & problems writing at times      Family History   Problem Relation Age of Onset    Arthritis-osteo Mother     Diabetes Mother     Diabetes Father     Arthritis-osteo Father     Cancer Maternal Grandmother     Breast Cancer Neg Hx       Social History   Substance Use Topics    Smoking status: Former Smoker     Packs/day: 1.50     Years: 20.00     Quit date: 5/24/2002    Smokeless tobacco: Never Used      Comment: quit smoking in 2002    Alcohol use No      Comment: pt was drinking wine in the evening but stopped last week due to surgery     Past Surgical History:   Procedure Laterality Date    HX ADENOIDECTOMY      HX APPENDECTOMY      HX CAROTID ENDARTERECTOMY Bilateral     HX ENDOSCOPY      multiple with dilation    HX GI      esophageal dilation    HX HERNIA REPAIR      HX HIP REPLACEMENT      HX ORTHOPAEDIC Right     knee    HX ORTHOPAEDIC Left     thumb; bone removed    HX ORTHOPAEDIC Right     middle finger sx    HX ORTHOPAEDIC  05/30/2018    lumbar surgery, bilateral laminectomy    HX TONSILLECTOMY      HX UROLOGICAL      testicular sx due to injury as a child      Prior to Admission medications    Medication Sig Start Date End Date Taking? Authorizing Provider   atorvastatin (LIPITOR) 80 mg tablet Take 80 mg by mouth daily. Yes Historical Provider   furosemide (LASIX) 40 mg tablet Take 40 mg by mouth daily. Yes Historical Provider   lubiPROStone (AMITIZA) 8 mcg capsule Take 8 mcg by mouth daily (with breakfast). Yes Historical Provider   metoprolol succinate (TOPROL-XL) 200 mg XL tablet Take 100 mg by mouth daily.    Yes Historical Provider   valsartan-hydroCHLOROthiazide (DIOVAN-HCT) 320-12.5 mg per tablet Take 0.5 Tabs by mouth daily. Yes Historical Provider   cephALEXin (KEFLEX) 500 mg capsule Take 1 Cap by mouth four (4) times daily. 18  Yes Christiano Giron MD   HYDROcodone-acetaminophen [facility]) 5-325 mg per tablet 1 q6h prn severe pain  Indications: Pain 18  Yes Nikole Singleton MD   multivitamin (ONE A DAY) tablet Take 1 Tab by mouth daily. Yes Historical Provider   ALPRAZolam Marjean Pepper) 2 mg tablet 1/2 to 1 qid prn anxiety  Indications: anxiety 18  Yes Nikole Singleton MD   Omeprazole delayed release (PRILOSEC D/R) 20 mg tablet 1 bid for stomach acid 18  Yes Nikole Singleton MD   naproxen (NAPROSYN) 500 mg tablet Take 500 mg by mouth two (2) times daily (with meals). Historical Provider   sildenafil citrate (VIAGRA) 100 mg tablet 1/2 to 1 q 24h prn 18   Nikole Singleton MD   naloxone [facility]) 4 mg/actuation nasal spray Use 1 spray intranasally into 1 nostril. Use a new Narcan nasal spray for subsequent doses and administer into alternating nostrils. May repeat every 2 to 3 minutes as needed. Indications: Opioid Toxicity 18   Nikole Singleton MD       Allergies   Allergen Reactions    Beet Rash        Review of Systems:  A comprehensive review of systems was negative except for that written in the History of Present Illness. Objective:     Visit Vitals    BP (!) 83/44    Pulse 72    Temp 98.1 °F (36.7 °C)    Resp 16    Ht 5' 8\" (1.727 m)    Wt 101.5 kg (223 lb 11.2 oz)    SpO2 92%    BMI 34.01 kg/m2     Temp (24hrs), Av °F (36.7 °C), Min:97.4 °F (36.3 °C), Max:98.5 °F (36.9 °C)       Lines:  Peripheral IV:          Physical Exam:    General:  Alert, cooperative, well nourished, overweight, well developed, appears stated age   Eyes:  Sclera anicteric. Pupils equally round and reactive to light.    Mouth/Throat: Mucous membranes normal, oral pharynx clear, mildly slurred and slow speech    Neck: Supple   Lungs:   Clear to auscultation bilaterally, good effort, no increased work of breathing on room air   CV:  Distant heart sounds, regular rate and rhythm, no audible murmur, click, rub or gallop   Abdomen:   Soft, non-tender. bowel sounds normal. non-distended   Extremities: No cyanosis or edema   Skin: Lumbar incision with wound vac in place, no erythema of surrounding skin   Lymph nodes: Cervical and supraclavicular normal   Musculoskeletal: No swelling, no deformity, mild R-sided weakness   Lines/Devices:  Intact, no erythema, drainage or tenderness   Psych: Alert and oriented, normal mood affect given the setting       Data Review:     CBC:  No results for input(s): WBC, GRANS, MONOS, EOS, ANEU, ABL, HGB, HCT, PLT, HGBEXT, HCTEXT, PLTEXT, HGBEXT, HCTEXT, PLTEXT in the last 72 hours. No lab exists for component: LYMPHS,  BARBY    BMP:  No results for input(s): CREA, BUN, NA, K, CL, CO2, AGAP, GLU in the last 72 hours. LFTS:  No results for input(s): TBILI, ALT, SGOT, AP, TP, ALB in the last 72 hours. Microbiology:     All Micro Results     Procedure Component Value Units Date/Time    [facility][id number]]  (Abnormal)  (Susceptibility) Collected:  06/22/18 1602    Order Status:  Completed Specimen:  Lumbar Updated:  06/26/18 0648     Special Requests: LUMBAR        GRAM STAIN 2 TO 8 WBC'S/OIF      FEW GRAM POSITIVE COCCI        Culture result:         LIGHT STAPHYLOCOCCUS LUGDUNENSIS (A)    CULTURE, ANAEROBIC [[id number]] Collected:  06/22/18 1602    Order Status:  Completed Specimen:  Lumbar Updated:  06/25/18 0955     Special Requests: NO SPECIAL REQUESTS        Culture result:         SUBCULTURE IS NECESSARY TO DETERMINE PRESENCE OR ABSENCE OF ANAEROBIC BACTERIA IN THIS CULTURE. FURTHER REPORT TO FOLLOW AFTER INCUBATION OF SUBCULTURE.           Imaging:   n/a    Signed By: Ava Goetz NP     June 26, 2018

## 2018-06-26 NOTE — PROGRESS NOTES
Patient is expected to discharge this afternoon. Wound vac was delivered today. Patient and spouse understand they will meet with Amedeo Solders with [employer] for education about managing IV antibiotics with home infusions. Patient's RN informed that he is having low BP, which might delay discharge until tomorrow.

## 2018-06-26 NOTE — PROGRESS NOTES
Problem: Mobility Impaired (Adult and Pediatric)  Goal: *Therapy Goal (Edit Goal, Insert Text)  STG:  (1.)Mr. Fiorella Blake will move from supine to sit and sit to supine , scoot up and down and roll side to side with MODIFIED INDEPENDENCE within 4 treatment day(s). (2.)Mr. Fiorella Blake will transfer from bed to chair and chair to bed with MODIFIED INDEPENDENCE using the least restrictive device within 4 treatment day(s). (Met 6/26/2018)  (3.)Mr. Fiorella Blake will ambulate with MODIFIED INDEPENDENCE for 500 feet with the least restrictive device within 4 treatment day(s). (Met 6/25/18)    LTG:  (1.)Mr. Fiorella Blake will move from supine to sit and sit to supine , scoot up and down and roll side to side in bed with INDEPENDENT within 7 treatment day(s). (2.)Mr. Fiorella Blake will transfer from bed to chair and chair to bed with INDEPENDENT using the least restrictive device within 7 treatment day(s). (3.)Mr. Fiorella Blake will ambulate with INDEPENDENT for 1000 feet with the least restrictive device within 7 treatment day(s). ________________________________________________________________________________________________       PHYSICAL THERAPY: Daily Note, Treatment Day: 3rd 6/26/2018  INPATIENT: Hospital Day: 5  Payor: Nickie Vora / Plan: [address] [place] [address] Avenue / Product Type: PPO /      NAME/AGE/GENDER: Cristhian Rodriguez is a 58 y.o. male   PRIMARY DIAGNOSIS: Postoperative infection, subsequent encounter [T81. 4XXD] Postoperative wound infection Postoperative wound infection  Procedure(s) (LRB):  INCISION AND DRAINAGE OF LUMBAR WOUND (N/A)  4 Days Post-Op  ICD-10: Treatment Diagnosis:    · Generalized Muscle Weakness (M62.81)  · Other lack of cordination (R27.8)  · Difficulty in walking, Not elsewhere classified (R26.2)  · Other abnormalities of gait and mobility (R26.89)   Precaution/Allergies:  Beet      ASSESSMENT:     Mr. Fiorella Blake  Is sitting in bedside chair with friend at bedside upon contact, and agreeable to PT treatment.  Pt reports 7/10 pain in L hip, and moderate pain in lumbar incision site (~5/10) Pt performed STS independently and demonstrated good standing balance, but did not demonstrate good safety awareness with wound vac management and environmental awareness. Pt ambulated 750ft in hallway with supervision, with frequent verbal cuing for safety awareness, and demonstrated fluctuating, shuffling gait with decreased L stance time due to L hip pain. Pt was very talkative and would occasionally stop in the middle of the hallway while talking. Pt perseverated on his medical history throughout treatment, and required frequent verbal cuing to attend to task. Pt returned to bedside chair with all needs met and within reach and friend at bedside. Educated pt on importance of pacing and taking his time with transitions while he is recovering. Pt met transfer goal today, but still requires supervision during ambulation for safety awareness. Will assess carryover next session. This section established at most recent assessment   PROBLEM LIST (Impairments causing functional limitations):  1. Decreased Strength  2. Decreased ADL/Functional Activities  3. Decreased Transfer Abilities  4. Decreased Ambulation Ability/Technique  5. Decreased Balance  6. Decreased Activity Tolerance  7. Decreased Pacing Skills  8. Decreased Flexibility/Joint Mobility  9. Decreased [place] with Home Exercise Program   INTERVENTIONS PLANNED: (Benefits and precautions of physical therapy have been discussed with the patient.)  1. Balance Exercise  2. Bed Mobility  3. Family Education  4. Gait Training  5. Home Exercise Program (HEP)  6. Range of Motion (ROM)  7. Therapeutic Activites  8. Therapeutic Exercise/Strengthening  9.  Transfer Training     TREATMENT PLAN: Frequency/Duration: daily for duration of hospital stay  Rehabilitation Potential For Stated Goals: Good     RECOMMENDED REHABILITATION/EQUIPMENT: (at time of discharge pending progress): Due to the probability of continued deficits (see above) this patient will likely need continued skilled physical therapy after discharge. Equipment:    None at this time              HISTORY:   History of Present Injury/Illness (Reason for Referral):  Patient is post operative for lumbar incision revision secondary to prior post operative infection. Past Medical History/Comorbidities:   Mr. Claudene Malone  has a past medical history of Adverse effect of anesthesia; Anxiety; Arthritis; Edema; GERD (gastroesophageal reflux disease); Hypertension; Inguinal hernia; PUD (peptic ulcer disease) (40 yrs ago); Severe obesity (BMI 35.0-39. 9) with comorbidity ([address] (4/24/2018); Status post left hip replacement (10/14/2015); and Stroke [facility]) (2002). Mr. Claudene Malone  has a past surgical history that includes hx endoscopy; hx gi; hx carotid endarterectomy (Bilateral); hx appendectomy; hx urological; hx hernia repair; hx tonsillectomy; hx adenoidectomy; hx hip replacement; hx orthopaedic (Right); hx orthopaedic (Left); hx orthopaedic (Right); and hx orthopaedic (05/30/2018). Social History/Living Environment:   Home Environment: Private residence  # Steps to Enter: 4  Rails to Enter: Yes  Hand Rails : Bilateral  One/Two Story Residence: One story  Living Alone: No  Support Systems: Spouse/Significant Other/Partner  Patient Expects to be Discharged to[de-identified] Private residence  Current DME Used/Available at Home: None  Tub or Shower Type: Shower  Prior Level of Function/Work/Activity:  Had been independent with all functional mobility. Dominant Side:         RIGHT  Personal Factors:          Sex:  male        Age:  58 y.o.    Number of Personal Factors/Comorbidities that affect the Plan of Care: 0: LOW COMPLEXITY   EXAMINATION:   Most Recent Physical Functioning:   Gross Assessment:                  Posture:     Balance:  Sitting: Intact  Sitting - Static: Good (unsupported)  Sitting - Dynamic: Good (unsupported)  Standing: Intact  Standing - Static: Good  Standing - Dynamic : Good Bed Mobility:     Wheelchair Mobility:     Transfers:  Sit to Stand: Modified independent  Stand to Sit: Modified independent  Gait:     Base of Support: Widened  Speed/Rukhsana: Fluctuations  Step Length: Left shortened;Right shortened  Stance: Left decreased;Weight shift  Gait Abnormalities: Altered arm swing;Decreased step clearance;Trunk sway increased  Distance (ft): 750 Feet (ft)  Ambulation - Level of Assistance: Supervision  Interventions: Safety awareness training; Tactile cues; Verbal cues; Visual/Demos      Body Structures Involved:  1. Joints  2. Muscles  3. Ligaments Body Functions Affected:  1. Neuromusculoskeletal  2. Movement Related  3. Skin Related  4. Metobolic/Endocrine Activities and Participation Affected:  1. General Tasks and Demands  2. Communication  3. Mobility  4. Community, Social and [place]   Number of elements that affect the Plan of Care: 1-2: LOW COMPLEXITY   CLINICAL PRESENTATION:   Presentation: Stable and uncomplicated: LOW COMPLEXITY   CLINICAL DECISION MAKIN [facility] Inpatient Short Form  How much difficulty does the patient currently have. .. Unable A Lot A Little None   1. Turning over in bed (including adjusting bedclothes, sheets and blankets)? [] 1   [] 2   [x] 3   [] 4   2. Sitting down on and standing up from a chair with arms ( e.g., wheelchair, bedside commode, etc.)   [] 1   [] 2   [x] 3   [] 4   3. Moving from lying on back to sitting on the side of the bed? [] 1   [] 2   [x] 3   [] 4   How much help from another person does the patient currently need. .. Total A Lot A Little None   4. Moving to and from a bed to a chair (including a wheelchair)? [] 1   [] 2   [x] 3   [] 4   5. Need to walk in hospital room? [] 1   [] 2   [x] 3   [] 4   6. Climbing 3-5 steps with a railing?    [] 1   [] 2   [x] 3   [] 4   © , Trustees of [facility] [employer], under license to [employer]. All rights reserved      Score:  Initial: 18 Most Recent: X (Date: -- )    Interpretation of Tool:  Represents activities that are increasingly more difficult (i.e. Bed mobility, Transfers, Gait). Score 24 23 22-20 19-15 14-10 9-7 6     Modifier CH CI CJ CK CL CM CN      ? Mobility - Walking and Moving Around:     - CURRENT STATUS: CK - 40%-59% impaired, limited or restricted    - GOAL STATUS: CJ - 20%-39% impaired, limited or restricted    - D/C STATUS:  ---------------To be determined---------------  Payor: HARLEY / Plan: Redd Saeed / Product Type: PPO /      Medical Necessity:     · Patient demonstrates good rehab potential due to higher previous functional level. Reason for Services/Other Comments:  · Patient continues to require skilled intervention due to medical complications, patient unable to attend/participate in therapy as expected and secondary surgery to clear infection. Use of outcome tool(s) and clinical judgement create a POC that gives a: Clear prediction of patient's progress: LOW COMPLEXITY            TREATMENT:   (In addition to Assessment/Re-Assessment sessions the following treatments were rendered)   Pre-treatment Symptoms/Complaints:  \"I want to walk\"  Pain: Initial:   Pain Intensity 1: 7  Pain Location 1: Hip  Pain Orientation 1: Left  Post Session:  Increased with walking, decreased with sitting 7/10 L hip         Therapeutic Activity: (   17 minutes ):  Therapeutic activities including Chair transfers, Ambulation on level ground, and education and verbal cuing for safety awareness and pacing to improve mobility, strength, balance and dynamic movement of leg - bilateral to improve functional dynamic mobility. Required minimal Safety awareness training; Tactile cues; Verbal cues; Visual/Demos to promote coordination of bilateral, lower extremity(s).       Date:  6/24/2018 Date:   Date:     Activity/Exercise Parameters Parameters Parameters Sitting LAQ 15 reps/1 set each LE; cues for slower movement and increased AROM     Sitting marching 15 reps/1 set each LE     Supine hip abduction/adduction 20 reps/1 set B LEs     Supine heel slides 20 reps/1 set each LEs     Supine SLR 10 reps/1 set each LE; cues to maintain knee extension throughout                   Braces/Orthotics/Lines/Etc:   · wound vac  · O2 Device: Room air  Treatment/Session Assessment:    · Response to Treatment:  See above  · Interdisciplinary Collaboration:   o Physical Therapy Assistant  o Registered Nurse  · After treatment position/precautions:   o Up in chair  o Bed/Chair-wheels locked  o Bed in low position  o Call light within reach  o Visitors at bedside   · Compliance with Program/Exercises: Will assess as treatment progresses. · Recommendations/Intent for next treatment session: \"Next visit will focus on advancements to more challenging activities, reduction in assistance provided and transfers, ambulation and mobility with increased safety. \".   Total Treatment Duration:  PT Patient Time In/Time Out  Time In: 1145  Time Out: [place]

## 2018-06-26 NOTE — PROGRESS NOTES
CM faxed Wound Vac referral to [facility] on this date. Awaiting response.

## 2018-06-26 NOTE — PROGRESS NOTES
Problem: Falls - Risk of  Goal: *Absence of Falls  Document Toni Fall Risk and appropriate interventions in the flowsheet. Outcome: Progressing Towards Goal  Fall Risk Interventions:  Mobility Interventions: Bed/chair exit alarm, Patient to call before getting OOB, PT Consult for mobility concerns    Mentation Interventions: Bed/chair exit alarm, Increase mobility, More frequent rounding    Medication Interventions: Bed/chair exit alarm, Evaluate medications/consider consulting pharmacy, Patient to call before getting OOB    Elimination Interventions: Call light in reach, Patient to call for help with toileting needs    History of Falls Interventions: Bed/chair exit alarm        Problem: Pressure Injury - Risk of  Goal: *Prevention of pressure injury  Document Barrera Scale and appropriate interventions in the flowsheet.    Outcome: Progressing Towards Goal  Pressure Injury Interventions:  Sensory Interventions: Assess changes in LOC, Check visual cues for pain, Discuss PT/OT consult with provider    Moisture Interventions: Absorbent underpads, Limit adult briefs    Activity Interventions: Increase time out of bed, Pressure redistribution bed/mattress(bed type), PT/OT evaluation    Mobility Interventions: HOB 30 degrees or less, Pressure redistribution bed/mattress (bed type), PT/OT evaluation    Nutrition Interventions: Document food/fluid/supplement intake

## 2018-06-27 ENCOUNTER — HOME HEALTH ADMISSION (OUTPATIENT)
Dept: HOME HEALTH SERVICES | Facility: HOME HEALTH | Age: 63
End: 2018-06-27
Payer: COMMERCIAL

## 2018-06-27 VITALS
SYSTOLIC BLOOD PRESSURE: 97 MMHG | HEIGHT: 68 IN | WEIGHT: 223.7 LBS | TEMPERATURE: 98.4 F | RESPIRATION RATE: 18 BRPM | HEART RATE: 88 BPM | OXYGEN SATURATION: 95 % | BODY MASS INDEX: 33.9 KG/M2 | DIASTOLIC BLOOD PRESSURE: 65 MMHG

## 2018-06-27 PROCEDURE — 74011250637 HC RX REV CODE- 250/637: Performed by: NEUROLOGICAL SURGERY

## 2018-06-27 PROCEDURE — 74011250636 HC RX REV CODE- 250/636: Performed by: NURSE PRACTITIONER

## 2018-06-27 PROCEDURE — 74011250636 HC RX REV CODE- 250/636: Performed by: INTERNAL MEDICINE

## 2018-06-27 PROCEDURE — 77030019934 HC DRSG VAC ASST KCON -B

## 2018-06-27 RX ORDER — OXYCODONE AND ACETAMINOPHEN 7.5; 325 MG/1; MG/1
1 TABLET ORAL
Qty: 15 TAB | Refills: 0 | Status: SHIPPED | OUTPATIENT
Start: 2018-06-27 | End: 2018-07-02

## 2018-06-27 RX ADMIN — Medication 2 G: at 08:45

## 2018-06-27 RX ADMIN — Medication 2 G: at 01:06

## 2018-06-27 RX ADMIN — NAPROXEN 500 MG: 250 TABLET ORAL at 08:45

## 2018-06-27 RX ADMIN — MULTIPLE VITAMINS W/ MINERALS TAB 1 TABLET: TAB at 08:45

## 2018-06-27 RX ADMIN — SODIUM CHLORIDE, PRESERVATIVE FREE 600 UNITS: 5 INJECTION INTRAVENOUS at 05:25

## 2018-06-27 RX ADMIN — PANTOPRAZOLE SODIUM 40 MG: 40 TABLET, DELAYED RELEASE ORAL at 05:26

## 2018-06-27 RX ADMIN — ATORVASTATIN CALCIUM 80 MG: 40 TABLET, FILM COATED ORAL at 08:45

## 2018-06-27 RX ADMIN — OXYCODONE HYDROCHLORIDE AND ACETAMINOPHEN 1 TABLET: 7.5; 325 TABLET ORAL at 01:20

## 2018-06-27 RX ADMIN — OXYCODONE HYDROCHLORIDE AND ACETAMINOPHEN 1 TABLET: 7.5; 325 TABLET ORAL at 05:26

## 2018-06-27 RX ADMIN — Medication 20 ML: at 05:25

## 2018-06-27 RX ADMIN — ALPRAZOLAM 2 MG: 0.5 TABLET ORAL at 01:08

## 2018-06-27 RX ADMIN — ZOLPIDEM TARTRATE 10 MG: 5 TABLET ORAL at 01:08

## 2018-06-27 RX ADMIN — ALPRAZOLAM 2 MG: 0.5 TABLET ORAL at 05:26

## 2018-06-27 NOTE — PROGRESS NOTES
Discharge instructions and prescriptions given and explained to pt and wife. Pt and wife verbalized understanding. Medication side effects sheet reviewed with pt. Pt to be discharged home, after a dose of antibiotics and once wound vac is changed to home machine.

## 2018-06-27 NOTE — PROGRESS NOTES
Problem: Pressure Injury - Risk of  Goal: *Prevention of pressure injury  Document Barrera Scale and appropriate interventions in the flowsheet.    Outcome: Progressing Towards Goal  Pressure Injury Interventions:  Sensory Interventions: Assess changes in LOC, Check visual cues for pain, Discuss PT/OT consult with provider    Moisture Interventions: Absorbent underpads, Limit adult briefs    Activity Interventions: Increase time out of bed, Pressure redistribution bed/mattress(bed type), PT/OT evaluation    Mobility Interventions: HOB 30 degrees or less, Pressure redistribution bed/mattress (bed type), PT/OT evaluation    Nutrition Interventions: Document food/fluid/supplement intake

## 2018-06-27 NOTE — DISCHARGE INSTRUCTIONS
Infection After Surgery: Care Instructions  Your Care Instructions  After surgery, an infection is always possible. It doesn't mean that the surgery didn't go well. Because an infection can be serious, your doctor has taken steps to manage it. Your doctor checked the infection and cleaned it if necessary. He or she may have made an opening in the area so that the pus can drain out. You may have gauze in the cut so that the area will stay open and keep draining. You may need antibiotics. You will need to follow up with your doctor to make sure the infection has gone away. Follow-up care is a key part of your treatment and safety. Be sure to make and go to all appointments, and call your doctor if you are having problems. It's also a good idea to know your test results and keep a list of the medicines you take. How can you care for yourself at home? · Make sure your surgeon knows that you saw a doctor about the infection. · If your doctor prescribed antibiotics, take them as directed. Do not stop taking them just because you feel better. You need to take the full course of antibiotics. · Ask your doctor if you can take an over-the-counter pain medicine, such as acetaminophen (Tylenol), ibuprofen (Advil, Motrin), or naproxen (Aleve). Be safe with medicines. Read and follow all instructions on the label. · Do not take two or more pain medicines at the same time unless the doctor told you to. Many pain medicines have acetaminophen, which is Tylenol. Too much acetaminophen (Tylenol) can be harmful. · Prop up the area on a pillow anytime you sit or lie down during the next 3 days. Try to keep it above the level of your heart. This will help reduce swelling. · Keep the skin clean and dry. · If you have a bandage, keep it clean and dry. · You may have a dressing over the cut (incision). A dressing helps the incision heal and protects it. Your doctor will tell you how to take care of this.  You can expect drainage from the wound. · If your doctor told you how to care for your incision, follow your doctor's instructions. If you did not get instructions, follow this general advice:  ¨ Wash around the incision with clean water 2 times a day. Don't use hydrogen peroxide or alcohol, which can slow healing. When should you call for help? Call your doctor now or seek immediate medical care if:  ? · You have signs that your infection is getting worse, such as:  ¨ Increased pain, swelling, warmth, or redness in the area. ¨ Red streaks leading from the area. ¨ Pus draining from the wound. ¨ A new or higher fever. ? Watch closely for changes in your health, and be sure to contact your doctor if you have any problems. Where can you learn more? Go to http://bessy-mimi.info/. Enter C340 in the search box to learn more about \"Infection After Surgery: Care Instructions. \"  Current as of: March 20, 2017  Content Version: 11.4  © 5237-3875 [employer]. Care instructions adapted under license by [employer] (which disclaims liability or warranty for this information). If you have questions about a medical condition or this instruction, always ask your healthcare professional. [place] [address] any warranty or liability for your use of this information. DISCHARGE SUMMARY from Nurse    PATIENT INSTRUCTIONS:    After general anesthesia or intravenous sedation, for 24 hours or while taking prescription Narcotics:  · Limit your activities  · Do not drive and operate hazardous machinery  · Do not make important personal or business decisions  · Do  not drink alcoholic beverages  · If you have not urinated within 8 hours after discharge, please contact your surgeon on call.     Report the following to your surgeon:  · Excessive pain, swelling, redness or odor of or around the surgical area  · Temperature over 100.5  · Nausea and vomiting lasting longer than 4 hours or if unable to take medications  · Any signs of decreased circulation or nerve impairment to extremity: change in color, persistent  numbness, tingling, coldness or increase pain  · Any questions    What to do at Home:  Recommended activity: See surgical instructions, diet as tolerated. *  Please give a list of your current medications to your Primary Care Provider. *  Please update this list whenever your medications are discontinued, doses are      changed, or new medications (including over-the-counter products) are added. *  Please carry medication information at all times in case of emergency situations. These are general instructions for a healthy lifestyle:    No smoking/ No tobacco products/ Avoid exposure to second hand smoke  Surgeon General's Warning:  Quitting smoking now greatly reduces serious risk to your health. Obesity, smoking, and sedentary lifestyle greatly increases your risk for illness    A healthy diet, regular physical exercise & weight monitoring are important for maintaining a healthy lifestyle    You may be retaining fluid if you have a history of heart failure or if you experience any of the following symptoms:  Weight gain of 3 pounds or more overnight or 5 pounds in a week, increased swelling in our hands or feet or shortness of breath while lying flat in bed. Please call your doctor as soon as you notice any of these symptoms; do not wait until your next office visit. Recognize signs and symptoms of STROKE:    F-face looks uneven    A-arms unable to move or move unevenly    S-speech slurred or non-existent    T-time-call 911 as soon as signs and symptoms begin-DO NOT go       Back to bed or wait to see if you get better-TIME IS BRAIN. Warning Signs of HEART ATTACK     Call 911 if you have these symptoms:   Chest discomfort. Most heart attacks involve discomfort in the center of the chest that lasts more than a few minutes, or that goes away and comes back.  It can feel like uncomfortable pressure, squeezing, fullness, or pain.  Discomfort in other areas of the upper body. Symptoms can include pain or discomfort in one or both arms, the back, neck, jaw, or stomach.  Shortness of breath with or without chest discomfort.  Other signs may include breaking out in a cold sweat, nausea, or lightheadedness. Don't wait more than five minutes to call 911 - MINUTES MATTER! Fast action can save your life. Calling 911 is almost always the fastest way to get lifesaving treatment. Emergency Medical Services staff can begin treatment when they arrive -- up to an hour sooner than if someone gets to the hospital by car. The discharge information has been reviewed with the patient. The patient verbalized understanding. Discharge medications reviewed with the patient and appropriate educational materials and side effects teaching were provided.   ___________________________________________________________________________________________________________________________________

## 2018-06-28 ENCOUNTER — HOME CARE VISIT (OUTPATIENT)
Dept: SCHEDULING | Facility: HOME HEALTH | Age: 63
End: 2018-06-28
Payer: COMMERCIAL

## 2018-06-28 VITALS
HEART RATE: 77 BPM | OXYGEN SATURATION: 96 % | RESPIRATION RATE: 18 BRPM | SYSTOLIC BLOOD PRESSURE: 102 MMHG | DIASTOLIC BLOOD PRESSURE: 52 MMHG | TEMPERATURE: 98.7 F

## 2018-06-28 PROCEDURE — A6252 ABSORPT DRG >16 <=48 W/O BDR: HCPCS

## 2018-06-28 PROCEDURE — A9270 NON-COVERED ITEM OR SERVICE: HCPCS

## 2018-06-28 PROCEDURE — A4216 STERILE WATER/SALINE, 10 ML: HCPCS

## 2018-06-28 PROCEDURE — A6222 GAUZE <=16 IN NO W/SAL W/O B: HCPCS

## 2018-06-28 PROCEDURE — 400013 HH SOC

## 2018-06-28 PROCEDURE — G0299 HHS/HOSPICE OF RN EA 15 MIN: HCPCS

## 2018-06-28 NOTE — DISCHARGE SUMMARY
[facility]  MATTY Alba  MR#: [id number]  : 1955  ACCOUNT #: [de-identified]   ADMIT DATE: 2018  DISCHARGE DATE: 2018    DISCHARGE DIAGNOSES:  1. Postoperative wound infection. 2.  History of CVA. 3.  Carotid artery stenosis. 4.  Hyperlipidemia. 5.  Hypertension. 6.  Obesity. 7.  Chronic pain syndrome. 8.  Esophageal stricture and reflux. 9.  Bilateral lower extremity edema. OPERATIONS AND PROCEDURES:  Lumbar wound incision and drainage on 2018. COMPLICATIONS:  None. DISCHARGE CONDITION:  Satisfactory. HISTORY OF PRESENT ILLNESS:  A 22-year-old gentleman status post lumbar decompressive laminectomy and facetectomy several weeks ago from L3 through L5. History of multiple health issues. He developed some superficial drainage and cultures were obtained which showed a Staph species and he was treated with local direct wound care and oral antibiotics. Despite this, however, his wound condition worsened. It was decided that he required an open incision and drainage and packing of the wound. PAST MEDICAL HISTORY:  As listed above. ALLERGIES:  ONLY TO BEETS. Exam revealed that he had several small areas of opening in his lumbar incision with some purulent drainage noted. No signs of cellulitis were seen. The wound was not hot to touch. Neurologically intact in the lower extremities. HOSPITAL COURSE:  The patient was taken to the operating room on  and underwent incision and drainage of the lumbar wound. Final cultures revealed Staph lugdunensis. Infectious disease was consulted and patient was kept on cefazolin 2 grams q.8 hours. A PICC line was placed and the wound clinician consult was placed. A Wound VAC was placed. The patient did well with physical therapy and did not have any significant discomfort of any sort.   It was decided that the patient would need a 6-week course of IV antibiotics, so therefore the PICC line was placed, home health was contacted and arrangements were made for him to have home IV antibiotic therapy. DISCHARGE INSTRUCTIONS:  Diet:  Regular. Activity:  As tolerated. Wound care for his Wound VAC with changing dressings every 2-3 days. IV cefazolin 2 grams q.8 hours. Return visit to my office in the next 4 weeks for reevaluation. Follow up with infectious disease per their recommendations. The patient will contact the physician if he develops any fever or abnormal neurological functions. DISCHARGE CONDITION:  Satisfactory and improved.         MD NURIA Urena/  D: 06/27/2018 10:00     T: 06/28/2018 08:05  JOB #: [id number]

## 2018-06-29 ENCOUNTER — HOME CARE VISIT (OUTPATIENT)
Dept: SCHEDULING | Facility: HOME HEALTH | Age: 63
End: 2018-06-29
Payer: COMMERCIAL

## 2018-06-29 VITALS
SYSTOLIC BLOOD PRESSURE: 130 MMHG | DIASTOLIC BLOOD PRESSURE: 62 MMHG | TEMPERATURE: 98.6 F | HEART RATE: 86 BPM | RESPIRATION RATE: 16 BRPM

## 2018-06-29 PROCEDURE — G0151 HHCP-SERV OF PT,EA 15 MIN: HCPCS

## 2018-06-30 ENCOUNTER — APPOINTMENT (OUTPATIENT)
Dept: ULTRASOUND IMAGING | Age: 63
End: 2018-06-30
Attending: EMERGENCY MEDICINE
Payer: COMMERCIAL

## 2018-06-30 ENCOUNTER — HOME CARE VISIT (OUTPATIENT)
Dept: SCHEDULING | Facility: HOME HEALTH | Age: 63
End: 2018-06-30
Payer: COMMERCIAL

## 2018-06-30 ENCOUNTER — HOSPITAL ENCOUNTER (EMERGENCY)
Age: 63
Discharge: HOME | End: 2018-06-30
Attending: EMERGENCY MEDICINE
Payer: COMMERCIAL

## 2018-06-30 VITALS
RESPIRATION RATE: 14 BRPM | SYSTOLIC BLOOD PRESSURE: 120 MMHG | TEMPERATURE: 98 F | DIASTOLIC BLOOD PRESSURE: 70 MMHG | OXYGEN SATURATION: 98 % | HEART RATE: 78 BPM

## 2018-06-30 VITALS
OXYGEN SATURATION: 100 % | RESPIRATION RATE: 16 BRPM | HEART RATE: 71 BPM | HEIGHT: 68 IN | TEMPERATURE: 98 F | BODY MASS INDEX: 34.86 KG/M2 | WEIGHT: 230 LBS | SYSTOLIC BLOOD PRESSURE: 128 MMHG | DIASTOLIC BLOOD PRESSURE: 77 MMHG

## 2018-06-30 DIAGNOSIS — R60.9 PERIPHERAL EDEMA: Primary | ICD-10-CM

## 2018-06-30 LAB
ALBUMIN SERPL-MCNC: 3 G/DL (ref 3.2–4.6)
ALBUMIN/GLOB SERPL: 0.7 {RATIO} (ref 1.2–3.5)
ALP SERPL-CCNC: 70 U/L (ref 50–136)
ALT SERPL-CCNC: 12 U/L (ref 12–65)
ANION GAP SERPL CALC-SCNC: 9 MMOL/L (ref 7–16)
AST SERPL-CCNC: 20 U/L (ref 15–37)
BASOPHILS # BLD: 0 K/UL (ref 0–0.2)
BASOPHILS NFR BLD: 0 % (ref 0–2)
BILIRUB SERPL-MCNC: 0.2 MG/DL (ref 0.2–1.1)
BNP SERPL-MCNC: 15 PG/ML
BUN SERPL-MCNC: 17 MG/DL (ref 8–23)
CALCIUM SERPL-MCNC: 8.9 MG/DL (ref 8.3–10.4)
CHLORIDE SERPL-SCNC: 106 MMOL/L (ref 98–107)
CO2 SERPL-SCNC: 29 MMOL/L (ref 21–32)
CREAT SERPL-MCNC: 0.92 MG/DL (ref 0.8–1.5)
CRP SERPL-MCNC: 6.2 MG/DL (ref 0–0.9)
DIFFERENTIAL METHOD BLD: ABNORMAL
EOSINOPHIL # BLD: 0.4 K/UL (ref 0–0.8)
EOSINOPHIL NFR BLD: 4 % (ref 0.5–7.8)
ERYTHROCYTE [DISTWIDTH] IN BLOOD BY AUTOMATED COUNT: 15.1 % (ref 11.9–14.6)
GLOBULIN SER CALC-MCNC: 4.2 G/DL (ref 2.3–3.5)
GLUCOSE SERPL-MCNC: 112 MG/DL (ref 65–100)
HCT VFR BLD AUTO: 30.9 % (ref 41.1–50.3)
HGB BLD-MCNC: 9.9 G/DL (ref 13.6–17.2)
IMM GRANULOCYTES # BLD: 0 K/UL (ref 0–0.5)
IMM GRANULOCYTES NFR BLD AUTO: 0 % (ref 0–5)
LYMPHOCYTES # BLD: 2.5 K/UL (ref 0.5–4.6)
LYMPHOCYTES NFR BLD: 26 % (ref 13–44)
MAGNESIUM SERPL-MCNC: 2.2 MG/DL (ref 1.8–2.4)
MCH RBC QN AUTO: 30.4 PG (ref 26.1–32.9)
MCHC RBC AUTO-ENTMCNC: 32 G/DL (ref 31.4–35)
MCV RBC AUTO: 94.8 FL (ref 79.6–97.8)
MONOCYTES # BLD: 0.7 K/UL (ref 0.1–1.3)
MONOCYTES NFR BLD: 7 % (ref 4–12)
NEUTS SEG # BLD: 5.9 K/UL (ref 1.7–8.2)
NEUTS SEG NFR BLD: 63 % (ref 43–78)
PLATELET # BLD AUTO: 213 K/UL (ref 150–450)
PMV BLD AUTO: 9.2 FL (ref 10.8–14.1)
POTASSIUM SERPL-SCNC: 3.8 MMOL/L (ref 3.5–5.1)
PROT SERPL-MCNC: 7.2 G/DL (ref 6.3–8.2)
RBC # BLD AUTO: 3.26 M/UL (ref 4.23–5.67)
SODIUM SERPL-SCNC: 144 MMOL/L (ref 136–145)
WBC # BLD AUTO: 9.6 K/UL (ref 4.3–11.1)

## 2018-06-30 PROCEDURE — 85025 COMPLETE CBC W/AUTO DIFF WBC: CPT | Performed by: EMERGENCY MEDICINE

## 2018-06-30 PROCEDURE — 81003 URINALYSIS AUTO W/O SCOPE: CPT | Performed by: EMERGENCY MEDICINE

## 2018-06-30 PROCEDURE — 74011250637 HC RX REV CODE- 250/637: Performed by: EMERGENCY MEDICINE

## 2018-06-30 PROCEDURE — 99284 EMERGENCY DEPT VISIT MOD MDM: CPT | Performed by: EMERGENCY MEDICINE

## 2018-06-30 PROCEDURE — 86140 C-REACTIVE PROTEIN: CPT | Performed by: EMERGENCY MEDICINE

## 2018-06-30 PROCEDURE — 80053 COMPREHEN METABOLIC PANEL: CPT | Performed by: EMERGENCY MEDICINE

## 2018-06-30 PROCEDURE — 93971 EXTREMITY STUDY: CPT

## 2018-06-30 PROCEDURE — 83880 ASSAY OF NATRIURETIC PEPTIDE: CPT | Performed by: EMERGENCY MEDICINE

## 2018-06-30 PROCEDURE — 74011250636 HC RX REV CODE- 250/636: Performed by: EMERGENCY MEDICINE

## 2018-06-30 PROCEDURE — 96374 THER/PROPH/DIAG INJ IV PUSH: CPT | Performed by: EMERGENCY MEDICINE

## 2018-06-30 PROCEDURE — G0299 HHS/HOSPICE OF RN EA 15 MIN: HCPCS

## 2018-06-30 PROCEDURE — 83735 ASSAY OF MAGNESIUM: CPT | Performed by: EMERGENCY MEDICINE

## 2018-06-30 RX ORDER — FUROSEMIDE 10 MG/ML
40 INJECTION INTRAMUSCULAR; INTRAVENOUS
Status: COMPLETED | OUTPATIENT
Start: 2018-06-30 | End: 2018-06-30

## 2018-06-30 RX ORDER — LORAZEPAM 1 MG/1
1 TABLET ORAL
Status: COMPLETED | OUTPATIENT
Start: 2018-06-30 | End: 2018-06-30

## 2018-06-30 RX ADMIN — FUROSEMIDE 40 MG: 10 INJECTION, SOLUTION INTRAMUSCULAR; INTRAVENOUS at 18:25

## 2018-06-30 RX ADMIN — LORAZEPAM 1 MG: 1 TABLET ORAL at 18:22

## 2018-06-30 NOTE — ED TRIAGE NOTES
Pt states having swelling in both of his feet for the past two days. Pt states he stopped taking his fluid pills because his blood pressure was low. Pt has a wound vac on his back.

## 2018-06-30 NOTE — ED PROVIDER NOTES
HPI Comments: According to wife, patient was admitted to the hospital from June 20 until June 28. He had previously had some degree of back surgery and then was taken to the operating room again for some additional surgical management, on the 21st.    Wife reports that during the entire hospitalization they had trouble with his blood pressure being on the low side, even down in the 80s. They took him off of his 40 mg Lasix daily, and his Toprol-XL ( chart is not clear whether he was on 100 or 200 mg)   He is given home since Thursday the 28th. Off of his Lasix,  Their main presenting complaint today is that his feet have been swollen for 2 days. Some concern over possible DVT. Patient had set in the waiting room for approximately 2 hours and 50 minutes no blood was drawn from his PICC line since they were waiting to get him into a room before doing so. Patient himself was asleep through most of the history from his wife and actually wants to physical exam.  She indicated that he has not been sleeping well and is very tired. Wants patient did come to, his first observation was that the wrong from the bed rail  Is killing his leg, even though I had RT repositioned his leg in his sleep, without arousing him. Patient then very quickly becomes adamant that he needs to go to the bathroom to void, and a urinal in bed or a urinal at the bedside will not work,  Then becomes very adamant that he needs his 2 mg of Xanax which she takes every 6 hours    Patient threatening to leave   Indicating he has done so before,  saying he didn't even want to be here in the first place    Patient is a 58 y.o. male presenting with foot swelling. The history is provided by the patient and the spouse. Foot Swelling    This is a new problem. The current episode started 2 days ago. The problem occurs constantly. Pertinent negatives include no back pain.         Past Medical History:   Diagnosis Date    Adverse effect of anesthesia wife states had delayed awaken    Anxiety     takes alprazolam daily    Arthritis     Edema     BLE; takes spironolactone daily    GERD (gastroesophageal reflux disease)     managed w/med    Hypertension     managed w/med    Inguinal hernia     left per pt    PUD (peptic ulcer disease) 40 yrs ago    pt states no current issues    Severe obesity (BMI 35.0-39. 9) with comorbidity ([facility] [address] 4/24/2018    Status post left hip replacement 10/14/2015    Stroke [facility]) 2002    pt states still limps w/right leg, has problems with speech & problems writing at times       Past Surgical History:   Procedure Laterality Date    HX ADENOIDECTOMY      HX APPENDECTOMY      HX CAROTID ENDARTERECTOMY Bilateral     HX ENDOSCOPY      multiple with dilation    HX GI      esophageal dilation    HX HERNIA REPAIR      HX HIP REPLACEMENT      HX ORTHOPAEDIC Right     knee    HX ORTHOPAEDIC Left     thumb; bone removed    HX ORTHOPAEDIC Right     middle finger sx    HX ORTHOPAEDIC  05/30/2018    lumbar surgery, bilateral laminectomy    HX TONSILLECTOMY      HX UROLOGICAL      testicular sx due to injury as a child         Family History:   Problem Relation Age of Onset    Arthritis-osteo Mother     Diabetes Mother     Diabetes Father     Arthritis-osteo Father     Cancer Maternal Grandmother     Breast Cancer Neg Hx        Social History     Social History    Marital status:      Spouse name: N/A    Number of children: N/A    Years of education: N/A     Occupational History    Not on file.      Social History Main Topics    Smoking status: Former Smoker     Packs/day: 1.50     Years: 20.00     Quit date: 5/24/2002    Smokeless tobacco: Never Used      Comment: quit smoking in 2002    Alcohol use No      Comment: pt was drinking wine in the evening but stopped last week due to surgery    Drug use: No    Sexual activity: Not on file     Other Topics Concern    Not on file     Social History Narrative ALLERGIES: Beet    Review of Systems   Constitutional: Negative for chills and fever. HENT: Negative for rhinorrhea and sore throat. Eyes: Negative for discharge and redness. Respiratory: Negative for cough and shortness of breath. Cardiovascular: Positive for leg swelling. Negative for chest pain. Gastrointestinal: Negative for abdominal pain, nausea and vomiting. Musculoskeletal: Negative for arthralgias and back pain. Skin: Negative for rash. Neurological: Negative for dizziness and headaches. Psychiatric/Behavioral: Positive for sleep disturbance. The patient is nervous/anxious. All other systems reviewed and are negative. Vitals:    06/30/18 1503   BP: 127/70   Pulse: 78   Resp: 18   Temp: 97.8 °F (36.6 °C)   SpO2: 100%   Weight: 104.3 kg (230 lb)   Height: 5' 8\" (1.727 m)            Physical Exam   Constitutional: He is oriented to person, place, and time. He appears well-developed and well-nourished. No distress (Initially  sleeping very, very, very soundly). HENT:   Head: Normocephalic and atraumatic. Eyes: Conjunctivae are normal. Pupils are equal, round, and reactive to light. Right eye exhibits no discharge. Left eye exhibits no discharge. No scleral icterus. Neck: Normal range of motion. Neck supple. Cardiovascular: Normal rate, regular rhythm and normal heart sounds. Exam reveals no gallop. No murmur heard. Pulmonary/Chest: Effort normal and breath sounds normal. No respiratory distress. He has no wheezes. He has no rales. Abdominal: Soft. There is no tenderness. There is no guarding. Musculoskeletal: Normal range of motion. He exhibits edema. Neurological: He is alert and oriented to person, place, and time. He exhibits normal muscle tone. cni 2-12 grossly   Skin: Skin is warm and dry. He is not diaphoretic. Wound VAC in place over his surgical back wound   Psychiatric: He has a normal mood and affect.  His behavior is normal.   Nursing note and vitals reviewed. MDM  Number of Diagnoses or Management Options  Peripheral edema:   Diagnosis management comments: Medical decision making note:  Bilateral foot swelling status post discontinuation of Lasix  Check labs, Doppler ultrasound to rule out DVT which was the primary concern. Resume Lasix to stay off of Toprol for now  Close follow-up with PCP for further management. This concludes the \"medical decision making note\" part of this emergency department visit note.           ED Course       Procedures

## 2018-07-01 NOTE — DISCHARGE INSTRUCTIONS
Resume daily lasix in the morning  Stay off toprol till advised by dr Maxim Bang  Return to the e.r. If worse in any way     Leg and Ankle Edema: Care Instructions  Your Care Instructions  Swelling in the legs, ankles, and feet is called edema. It is common after you sit or stand for a while. Long plane flights or car rides often cause swelling in the legs and feet. You may also have swelling if you have to stand for long periods of time at your job. Problems with the veins in the legs (varicose veins) and changes in hormones can also cause swelling. Sometimes the swelling in the ankles and feet is caused by a more serious problem, such as heart failure, infection, blood clots, or liver or kidney disease. Follow-up care is a key part of your treatment and safety. Be sure to make and go to all appointments, and call your doctor if you are having problems. It's also a good idea to know your test results and keep a list of the medicines you take. How can you care for yourself at home? · If your doctor gave you medicine, take it as prescribed. Call your doctor if you think you are having a problem with your medicine. · Whenever you are resting, raise your legs up. Try to keep the swollen area higher than the level of your heart. · Take breaks from standing or sitting in one position. ¨ Walk around to increase the blood flow in your lower legs. ¨ Move your feet and ankles often while you stand, or tighten and relax your leg muscles. · Wear support stockings. Put them on in the morning, before swelling gets worse. · Eat a balanced diet. Lose weight if you need to. · Limit the amount of salt (sodium) in your diet. Salt holds fluid in the body and may increase swelling. When should you call for help? Call 911 anytime you think you may need emergency care. For example, call if:  ? · You have symptoms of a blood clot in your lung (called a pulmonary embolism). These may include:  ¨ Sudden chest pain.   ¨ Trouble breathing. ¨ Coughing up blood. ?Call your doctor now or seek immediate medical care if:  ? · You have signs of a blood clot, such as:  ¨ Pain in your calf, back of the knee, thigh, or groin. ¨ Redness and swelling in your leg or groin. ? · You have symptoms of infection, such as:  ¨ Increased pain, swelling, warmth, or redness. ¨ Red streaks or pus. ¨ A fever. ? Watch closely for changes in your health, and be sure to contact your doctor if:  ? · Your swelling is getting worse. ? · You have new or worsening pain in your legs. ? · You do not get better as expected. Where can you learn more? Go to http://bessy-mimi.info/. Enter M746 in the search box to learn more about \"Leg and Ankle Edema: Care Instructions. \"  Current as of: March 20, 2017  Content Version: 11.4  © 9401-0003 [employer]. Care instructions adapted under license by [employer] (which disclaims liability or warranty for this information). If you have questions about a medical condition or this instruction, always ask your healthcare professional. [place] [address] any warranty or liability for your use of this information.

## 2018-07-01 NOTE — ED NOTES
I have reviewed discharge instructions with the patient. The patient verbalized understanding. Patient left ED via Discharge Method: ambulatory to Home with self. Opportunity for questions and clarification provided. Patient given 0 scripts. To continue your aftercare when you leave the hospital, you may receive an automated call from our care team to check in on how you are doing. This is a free service and part of our promise to provide the best care and service to meet your aftercare needs.  If you have questions, or wish to unsubscribe from this service please call [phone]. Thank you for Choosing our [employer] [facility] Emergency Department.

## 2018-07-02 ENCOUNTER — HOME CARE VISIT (OUTPATIENT)
Dept: SCHEDULING | Facility: HOME HEALTH | Age: 63
End: 2018-07-02
Payer: COMMERCIAL

## 2018-07-02 ENCOUNTER — HOSPITAL ENCOUNTER (OUTPATIENT)
Dept: LAB | Age: 63
Discharge: HOME | End: 2018-07-02
Payer: COMMERCIAL

## 2018-07-02 VITALS
TEMPERATURE: 98 F | SYSTOLIC BLOOD PRESSURE: 128 MMHG | OXYGEN SATURATION: 98 % | DIASTOLIC BLOOD PRESSURE: 68 MMHG | RESPIRATION RATE: 18 BRPM | HEART RATE: 68 BPM

## 2018-07-02 LAB
ALBUMIN SERPL-MCNC: 3.1 G/DL (ref 3.2–4.6)
ALBUMIN/GLOB SERPL: 0.8 {RATIO} (ref 1.2–3.5)
ALP SERPL-CCNC: 72 U/L (ref 50–136)
ALT SERPL-CCNC: 16 U/L (ref 12–65)
AST SERPL-CCNC: 22 U/L (ref 15–37)
BACTERIA SPEC CULT: NORMAL
BASOPHILS # BLD: 0.1 K/UL (ref 0–0.2)
BASOPHILS NFR BLD: 1 % (ref 0–2)
BILIRUB DIRECT SERPL-MCNC: <0.1 MG/DL
BILIRUB SERPL-MCNC: 0.2 MG/DL (ref 0.2–1.1)
CREAT SERPL-MCNC: 0.96 MG/DL (ref 0.8–1.5)
CRP SERPL-MCNC: 5.9 MG/DL (ref 0–0.9)
DIFFERENTIAL METHOD BLD: ABNORMAL
EOSINOPHIL # BLD: 0.4 K/UL (ref 0–0.8)
EOSINOPHIL NFR BLD: 4 % (ref 0.5–7.8)
ERYTHROCYTE [DISTWIDTH] IN BLOOD BY AUTOMATED COUNT: 15.1 % (ref 11.9–14.6)
ERYTHROCYTE [SEDIMENTATION RATE] IN BLOOD: 119 MM/HR (ref 0–20)
GLOBULIN SER CALC-MCNC: 4.1 G/DL (ref 2.3–3.5)
HCT VFR BLD AUTO: 30.4 % (ref 41.1–50.3)
HGB BLD-MCNC: 9.8 G/DL (ref 13.6–17.2)
IMM GRANULOCYTES # BLD: 0 K/UL (ref 0–0.5)
IMM GRANULOCYTES NFR BLD AUTO: 1 % (ref 0–5)
LYMPHOCYTES # BLD: 2.5 K/UL (ref 0.5–4.6)
LYMPHOCYTES NFR BLD: 28 % (ref 13–44)
MCH RBC QN AUTO: 30.7 PG (ref 26.1–32.9)
MCHC RBC AUTO-ENTMCNC: 32.2 G/DL (ref 31.4–35)
MCV RBC AUTO: 95.3 FL (ref 79.6–97.8)
MONOCYTES # BLD: 0.9 K/UL (ref 0.1–1.3)
MONOCYTES NFR BLD: 11 % (ref 4–12)
NEUTS SEG # BLD: 4.9 K/UL (ref 1.7–8.2)
NEUTS SEG NFR BLD: 55 % (ref 43–78)
PLATELET # BLD AUTO: 240 K/UL (ref 150–450)
PMV BLD AUTO: 9.6 FL (ref 10.8–14.1)
PROT SERPL-MCNC: 7.2 G/DL (ref 6.3–8.2)
RBC # BLD AUTO: 3.19 M/UL (ref 4.23–5.67)
SERVICE CMNT-IMP: NORMAL
WBC # BLD AUTO: 8.7 K/UL (ref 4.3–11.1)

## 2018-07-02 PROCEDURE — G0299 HHS/HOSPICE OF RN EA 15 MIN: HCPCS

## 2018-07-02 PROCEDURE — 82565 ASSAY OF CREATININE: CPT | Performed by: FAMILY MEDICINE

## 2018-07-02 PROCEDURE — 85025 COMPLETE CBC W/AUTO DIFF WBC: CPT | Performed by: FAMILY MEDICINE

## 2018-07-02 PROCEDURE — 85652 RBC SED RATE AUTOMATED: CPT | Performed by: FAMILY MEDICINE

## 2018-07-02 PROCEDURE — 80076 HEPATIC FUNCTION PANEL: CPT | Performed by: FAMILY MEDICINE

## 2018-07-02 PROCEDURE — 86140 C-REACTIVE PROTEIN: CPT | Performed by: FAMILY MEDICINE

## 2018-07-04 ENCOUNTER — HOME CARE VISIT (OUTPATIENT)
Dept: SCHEDULING | Facility: HOME HEALTH | Age: 63
End: 2018-07-04
Payer: COMMERCIAL

## 2018-07-04 VITALS
TEMPERATURE: 98 F | SYSTOLIC BLOOD PRESSURE: 132 MMHG | RESPIRATION RATE: 18 BRPM | HEART RATE: 68 BPM | OXYGEN SATURATION: 98 % | DIASTOLIC BLOOD PRESSURE: 78 MMHG

## 2018-07-04 PROCEDURE — G0299 HHS/HOSPICE OF RN EA 15 MIN: HCPCS

## 2018-07-05 PROBLEM — Z51.89 VISIT FOR WOUND CHECK: Status: ACTIVE | Noted: 2018-07-05

## 2018-07-05 NOTE — OP NOTES
[facility] REPORT    Minor Hashimoto  MR#: [id number]  : 1955  ACCOUNT #: [de-identified]   DATE OF SERVICE: 2018    PREOPERATIVE DIAGNOSIS:  Postoperative lumbar wound infection. POSTOPERATIVE DIAGNOSIS:   Postoperative lumbar wound infection. PROCEDURES PERFORMED:  Lumbar wound incision, drainage and irrigation      SURGEON:  Kathia Cordero MD    ASSISTANT:  None. ANESTHESIA:  General endotracheal.    ESTIMATED BLOOD LOSS:  Minimal.    PREPARATION:  ChloraPrep. COMPLICATIONS:  None. IMPLANTS:  None. SPECIMENS REMOVED:  Intraoperative cultures for Gram stain, culture and sensitivity. HISTORY OF PRESENT ILLNESS:  A 58-year-old gentleman status post lumbar decompressive laminectomy for spinal stenosis with good results. However, he developed postoperative drainage which worsened despite medical treatment, antibiotics and packing. He was taken to the operating room for wound exploration, incision, drainage and irrigation with packing. OPERATIVE NOTE:  The patient was brought to the operating room and was carefully placed under general endotracheal anesthesia without complications, carefully positioned prone on the Cloward frame and the posterior aspect of the back was shaved and prepped in usual sterile fashion. The previous incision was opened with a 15 blade down to the fascial tissues. Sutures were removed with arterial hemostat and forceps. Fluid was present in the supramuscular and submuscular tissues and intraoperative cultures were obtained and sent for Gram stain, culture and sensitivity, both aerobic and anaerobic. Wound was copiously irrigated with antibiotic irrigation until clean. No further drainage was noted. Eschar was then scraped off sharply with Bovie cautery to bleeding healthy tissues throughout. The wound was then packed covering with 4 x 4's, ABD, and tape.   The patient tolerated the procedure well and was turned supine, awakened, extubated, and taken to PACU in stable condition.       MD NURIA [place] / [place]  D: 07/05/2018 11:13     T: 07/05/2018 11:27  JOB #: [id number]

## 2018-07-06 ENCOUNTER — HOME CARE VISIT (OUTPATIENT)
Dept: SCHEDULING | Facility: HOME HEALTH | Age: 63
End: 2018-07-06
Payer: COMMERCIAL

## 2018-07-06 PROCEDURE — G0299 HHS/HOSPICE OF RN EA 15 MIN: HCPCS

## 2018-07-07 VITALS
RESPIRATION RATE: 18 BRPM | OXYGEN SATURATION: 97 % | DIASTOLIC BLOOD PRESSURE: 78 MMHG | SYSTOLIC BLOOD PRESSURE: 132 MMHG | HEART RATE: 68 BPM | TEMPERATURE: 98 F

## 2018-07-09 ENCOUNTER — HOME CARE VISIT (OUTPATIENT)
Dept: HOME HEALTH SERVICES | Facility: HOME HEALTH | Age: 63
End: 2018-07-09
Payer: COMMERCIAL

## 2018-07-09 ENCOUNTER — HOSPITAL ENCOUNTER (OUTPATIENT)
Dept: LAB | Age: 63
Discharge: HOME | End: 2018-07-09
Payer: COMMERCIAL

## 2018-07-09 ENCOUNTER — HOME CARE VISIT (OUTPATIENT)
Dept: SCHEDULING | Facility: HOME HEALTH | Age: 63
End: 2018-07-09
Payer: COMMERCIAL

## 2018-07-09 VITALS
RESPIRATION RATE: 18 BRPM | DIASTOLIC BLOOD PRESSURE: 76 MMHG | SYSTOLIC BLOOD PRESSURE: 128 MMHG | HEART RATE: 70 BPM | TEMPERATURE: 98 F | OXYGEN SATURATION: 97 %

## 2018-07-09 LAB
ALBUMIN SERPL-MCNC: 3.3 G/DL (ref 3.2–4.6)
ALBUMIN/GLOB SERPL: 0.7 {RATIO} (ref 1.2–3.5)
ALP SERPL-CCNC: 77 U/L (ref 50–136)
ALT SERPL-CCNC: 13 U/L (ref 12–65)
AST SERPL-CCNC: 15 U/L (ref 15–37)
BASOPHILS # BLD: 0.1 K/UL (ref 0–0.2)
BASOPHILS NFR BLD: 1 % (ref 0–2)
BILIRUB DIRECT SERPL-MCNC: <0.1 MG/DL
BILIRUB SERPL-MCNC: 0.3 MG/DL (ref 0.2–1.1)
CREAT SERPL-MCNC: 1.15 MG/DL (ref 0.8–1.5)
CRP SERPL-MCNC: 8.5 MG/DL (ref 0–0.9)
DIFFERENTIAL METHOD BLD: ABNORMAL
EOSINOPHIL # BLD: 0.6 K/UL (ref 0–0.8)
EOSINOPHIL NFR BLD: 7 % (ref 0.5–7.8)
ERYTHROCYTE [DISTWIDTH] IN BLOOD BY AUTOMATED COUNT: 15.3 % (ref 11.9–14.6)
ERYTHROCYTE [SEDIMENTATION RATE] IN BLOOD: 114 MM/HR (ref 0–20)
GLOBULIN SER CALC-MCNC: 4.6 G/DL (ref 2.3–3.5)
HCT VFR BLD AUTO: 32.1 % (ref 41.1–50.3)
HGB BLD-MCNC: 10.1 G/DL (ref 13.6–17.2)
IMM GRANULOCYTES # BLD: 0 K/UL (ref 0–0.5)
IMM GRANULOCYTES NFR BLD AUTO: 0 % (ref 0–5)
LYMPHOCYTES # BLD: 2.5 K/UL (ref 0.5–4.6)
LYMPHOCYTES NFR BLD: 31 % (ref 13–44)
MCH RBC QN AUTO: 30.4 PG (ref 26.1–32.9)
MCHC RBC AUTO-ENTMCNC: 31.5 G/DL (ref 31.4–35)
MCV RBC AUTO: 96.7 FL (ref 79.6–97.8)
MONOCYTES # BLD: 0.7 K/UL (ref 0.1–1.3)
MONOCYTES NFR BLD: 9 % (ref 4–12)
NEUTS SEG # BLD: 4.2 K/UL (ref 1.7–8.2)
NEUTS SEG NFR BLD: 52 % (ref 43–78)
PLATELET # BLD AUTO: 384 K/UL (ref 150–450)
PMV BLD AUTO: 9.4 FL (ref 10.8–14.1)
PROT SERPL-MCNC: 7.9 G/DL (ref 6.3–8.2)
RBC # BLD AUTO: 3.32 M/UL (ref 4.23–5.67)
WBC # BLD AUTO: 8 K/UL (ref 4.3–11.1)

## 2018-07-09 PROCEDURE — 82565 ASSAY OF CREATININE: CPT | Performed by: NEUROLOGICAL SURGERY

## 2018-07-09 PROCEDURE — 86140 C-REACTIVE PROTEIN: CPT | Performed by: NEUROLOGICAL SURGERY

## 2018-07-09 PROCEDURE — 85025 COMPLETE CBC W/AUTO DIFF WBC: CPT | Performed by: NEUROLOGICAL SURGERY

## 2018-07-09 PROCEDURE — G0299 HHS/HOSPICE OF RN EA 15 MIN: HCPCS

## 2018-07-09 PROCEDURE — 85652 RBC SED RATE AUTOMATED: CPT | Performed by: NEUROLOGICAL SURGERY

## 2018-07-09 PROCEDURE — 80076 HEPATIC FUNCTION PANEL: CPT | Performed by: NEUROLOGICAL SURGERY

## 2018-07-11 ENCOUNTER — HOME CARE VISIT (OUTPATIENT)
Dept: SCHEDULING | Facility: HOME HEALTH | Age: 63
End: 2018-07-11
Payer: COMMERCIAL

## 2018-07-11 VITALS
RESPIRATION RATE: 18 BRPM | TEMPERATURE: 98 F | DIASTOLIC BLOOD PRESSURE: 78 MMHG | OXYGEN SATURATION: 98 % | SYSTOLIC BLOOD PRESSURE: 132 MMHG | HEART RATE: 70 BPM

## 2018-07-11 PROCEDURE — G0299 HHS/HOSPICE OF RN EA 15 MIN: HCPCS

## 2018-07-13 ENCOUNTER — HOME CARE VISIT (OUTPATIENT)
Dept: SCHEDULING | Facility: HOME HEALTH | Age: 63
End: 2018-07-13
Payer: COMMERCIAL

## 2018-07-13 VITALS
TEMPERATURE: 98 F | SYSTOLIC BLOOD PRESSURE: 132 MMHG | HEART RATE: 84 BPM | OXYGEN SATURATION: 98 % | DIASTOLIC BLOOD PRESSURE: 78 MMHG | RESPIRATION RATE: 18 BRPM

## 2018-07-13 PROCEDURE — G0299 HHS/HOSPICE OF RN EA 15 MIN: HCPCS

## 2018-07-15 ENCOUNTER — HOME CARE VISIT (OUTPATIENT)
Dept: SCHEDULING | Facility: HOME HEALTH | Age: 63
End: 2018-07-15
Payer: COMMERCIAL

## 2018-07-15 VITALS
RESPIRATION RATE: 15 BRPM | DIASTOLIC BLOOD PRESSURE: 70 MMHG | TEMPERATURE: 98 F | HEART RATE: 76 BPM | OXYGEN SATURATION: 98 % | SYSTOLIC BLOOD PRESSURE: 122 MMHG

## 2018-07-15 PROCEDURE — G0299 HHS/HOSPICE OF RN EA 15 MIN: HCPCS

## 2018-07-16 ENCOUNTER — HOME CARE VISIT (OUTPATIENT)
Dept: SCHEDULING | Facility: HOME HEALTH | Age: 63
End: 2018-07-16
Payer: COMMERCIAL

## 2018-07-16 ENCOUNTER — HOSPITAL ENCOUNTER (OUTPATIENT)
Dept: LAB | Age: 63
Discharge: HOME | End: 2018-07-16
Payer: COMMERCIAL

## 2018-07-16 VITALS
DIASTOLIC BLOOD PRESSURE: 65 MMHG | TEMPERATURE: 98 F | SYSTOLIC BLOOD PRESSURE: 110 MMHG | OXYGEN SATURATION: 98 % | HEART RATE: 74 BPM | RESPIRATION RATE: 18 BRPM

## 2018-07-16 LAB
ALBUMIN SERPL-MCNC: 3.1 G/DL (ref 3.2–4.6)
ALBUMIN/GLOB SERPL: 0.7 {RATIO} (ref 1.2–3.5)
ALP SERPL-CCNC: 72 U/L (ref 50–136)
ALT SERPL-CCNC: 9 U/L (ref 12–65)
AST SERPL-CCNC: 24 U/L (ref 15–37)
BASOPHILS # BLD: 0.1 K/UL (ref 0–0.2)
BASOPHILS NFR BLD: 1 % (ref 0–2)
BILIRUB DIRECT SERPL-MCNC: <0.1 MG/DL
BILIRUB SERPL-MCNC: 0.3 MG/DL (ref 0.2–1.1)
CREAT SERPL-MCNC: 0.84 MG/DL (ref 0.8–1.5)
CRP SERPL-MCNC: 6.5 MG/DL (ref 0–0.9)
DIFFERENTIAL METHOD BLD: ABNORMAL
EOSINOPHIL # BLD: 0.3 K/UL (ref 0–0.8)
EOSINOPHIL NFR BLD: 4 % (ref 0.5–7.8)
ERYTHROCYTE [DISTWIDTH] IN BLOOD BY AUTOMATED COUNT: 15.2 % (ref 11.9–14.6)
ERYTHROCYTE [SEDIMENTATION RATE] IN BLOOD: 119 MM/HR (ref 0–20)
GLOBULIN SER CALC-MCNC: 4.7 G/DL (ref 2.3–3.5)
HCT VFR BLD AUTO: 30.8 % (ref 41.1–50.3)
HGB BLD-MCNC: 9.6 G/DL (ref 13.6–17.2)
IMM GRANULOCYTES # BLD: 0.1 K/UL (ref 0–0.5)
IMM GRANULOCYTES NFR BLD AUTO: 1 % (ref 0–5)
LYMPHOCYTES # BLD: 2.1 K/UL (ref 0.5–4.6)
LYMPHOCYTES NFR BLD: 29 % (ref 13–44)
MCH RBC QN AUTO: 29.4 PG (ref 26.1–32.9)
MCHC RBC AUTO-ENTMCNC: 31.2 G/DL (ref 31.4–35)
MCV RBC AUTO: 94.2 FL (ref 79.6–97.8)
MONOCYTES # BLD: 0.6 K/UL (ref 0.1–1.3)
MONOCYTES NFR BLD: 9 % (ref 4–12)
NEUTS SEG # BLD: 4 K/UL (ref 1.7–8.2)
NEUTS SEG NFR BLD: 56 % (ref 43–78)
PLATELET # BLD AUTO: 431 K/UL (ref 150–450)
PMV BLD AUTO: 9.4 FL (ref 10.8–14.1)
PROT SERPL-MCNC: 7.8 G/DL (ref 6.3–8.2)
RBC # BLD AUTO: 3.27 M/UL (ref 4.23–5.67)
WBC # BLD AUTO: 7.2 K/UL (ref 4.3–11.1)

## 2018-07-16 PROCEDURE — G0299 HHS/HOSPICE OF RN EA 15 MIN: HCPCS

## 2018-07-16 PROCEDURE — 82565 ASSAY OF CREATININE: CPT | Performed by: NEUROLOGICAL SURGERY

## 2018-07-16 PROCEDURE — 80076 HEPATIC FUNCTION PANEL: CPT | Performed by: NEUROLOGICAL SURGERY

## 2018-07-16 PROCEDURE — 85652 RBC SED RATE AUTOMATED: CPT | Performed by: NEUROLOGICAL SURGERY

## 2018-07-16 PROCEDURE — 86140 C-REACTIVE PROTEIN: CPT | Performed by: NEUROLOGICAL SURGERY

## 2018-07-16 PROCEDURE — 85025 COMPLETE CBC W/AUTO DIFF WBC: CPT | Performed by: NEUROLOGICAL SURGERY

## 2018-07-18 ENCOUNTER — HOME CARE VISIT (OUTPATIENT)
Dept: SCHEDULING | Facility: HOME HEALTH | Age: 63
End: 2018-07-18
Payer: COMMERCIAL

## 2018-07-18 VITALS
TEMPERATURE: 98 F | HEART RATE: 74 BPM | DIASTOLIC BLOOD PRESSURE: 78 MMHG | OXYGEN SATURATION: 98 % | RESPIRATION RATE: 18 BRPM | SYSTOLIC BLOOD PRESSURE: 134 MMHG

## 2018-07-18 PROCEDURE — A4649 SURGICAL SUPPLIES: HCPCS

## 2018-07-18 PROCEDURE — A6260 WOUND CLEANSER ANY TYPE/SIZE: HCPCS

## 2018-07-18 PROCEDURE — G0299 HHS/HOSPICE OF RN EA 15 MIN: HCPCS

## 2018-07-18 PROCEDURE — A5120 SKIN BARRIER, WIPE OR SWAB: HCPCS

## 2018-07-18 PROCEDURE — A6402 STERILE GAUZE <= 16 SQ IN: HCPCS

## 2018-07-20 ENCOUNTER — HOME CARE VISIT (OUTPATIENT)
Dept: SCHEDULING | Facility: HOME HEALTH | Age: 63
End: 2018-07-20
Payer: COMMERCIAL

## 2018-07-20 VITALS
OXYGEN SATURATION: 98 % | HEART RATE: 68 BPM | TEMPERATURE: 98 F | DIASTOLIC BLOOD PRESSURE: 78 MMHG | SYSTOLIC BLOOD PRESSURE: 132 MMHG | RESPIRATION RATE: 18 BRPM

## 2018-07-20 PROCEDURE — G0299 HHS/HOSPICE OF RN EA 15 MIN: HCPCS

## 2018-07-20 PROCEDURE — A6260 WOUND CLEANSER ANY TYPE/SIZE: HCPCS

## 2018-07-20 PROCEDURE — A4649 SURGICAL SUPPLIES: HCPCS

## 2018-07-20 PROCEDURE — A6454 SELF-ADHER BAND W>=3" <5"/YD: HCPCS

## 2018-07-23 ENCOUNTER — HOSPITAL ENCOUNTER (OUTPATIENT)
Dept: LAB | Age: 63
Discharge: HOME | End: 2018-07-23
Payer: COMMERCIAL

## 2018-07-23 ENCOUNTER — HOME CARE VISIT (OUTPATIENT)
Dept: SCHEDULING | Facility: HOME HEALTH | Age: 63
End: 2018-07-23
Payer: COMMERCIAL

## 2018-07-23 VITALS
OXYGEN SATURATION: 98 % | BODY MASS INDEX: 33.91 KG/M2 | TEMPERATURE: 98 F | RESPIRATION RATE: 18 BRPM | WEIGHT: 223 LBS | DIASTOLIC BLOOD PRESSURE: 78 MMHG | SYSTOLIC BLOOD PRESSURE: 142 MMHG | HEART RATE: 68 BPM

## 2018-07-23 LAB
ALBUMIN SERPL-MCNC: 3.4 G/DL (ref 3.2–4.6)
ALBUMIN/GLOB SERPL: 0.7 {RATIO} (ref 1.2–3.5)
ALP SERPL-CCNC: 84 U/L (ref 50–136)
ALT SERPL-CCNC: 10 U/L (ref 12–65)
AST SERPL-CCNC: 21 U/L (ref 15–37)
BASOPHILS # BLD: 0.1 K/UL (ref 0–0.2)
BASOPHILS NFR BLD: 0 % (ref 0–2)
BILIRUB DIRECT SERPL-MCNC: <0.1 MG/DL
BILIRUB SERPL-MCNC: 0.2 MG/DL (ref 0.2–1.1)
CREAT SERPL-MCNC: 1.16 MG/DL (ref 0.8–1.5)
CRP SERPL-MCNC: 3.9 MG/DL (ref 0–0.9)
DIFFERENTIAL METHOD BLD: ABNORMAL
EOSINOPHIL # BLD: 0.3 K/UL (ref 0–0.8)
EOSINOPHIL NFR BLD: 2 % (ref 0.5–7.8)
ERYTHROCYTE [DISTWIDTH] IN BLOOD BY AUTOMATED COUNT: 15.6 % (ref 11.9–14.6)
ERYTHROCYTE [SEDIMENTATION RATE] IN BLOOD: 88 MM/HR (ref 0–20)
GLOBULIN SER CALC-MCNC: 4.8 G/DL (ref 2.3–3.5)
HCT VFR BLD AUTO: 36.2 % (ref 41.1–50.3)
HGB BLD-MCNC: 11.2 G/DL (ref 13.6–17.2)
IMM GRANULOCYTES # BLD: 0 K/UL (ref 0–0.5)
IMM GRANULOCYTES NFR BLD AUTO: 0 % (ref 0–5)
LYMPHOCYTES # BLD: 2.1 K/UL (ref 0.5–4.6)
LYMPHOCYTES NFR BLD: 14 % (ref 13–44)
MCH RBC QN AUTO: 29.2 PG (ref 26.1–32.9)
MCHC RBC AUTO-ENTMCNC: 30.9 G/DL (ref 31.4–35)
MCV RBC AUTO: 94.5 FL (ref 79.6–97.8)
MONOCYTES # BLD: 0.9 K/UL (ref 0.1–1.3)
MONOCYTES NFR BLD: 6 % (ref 4–12)
NEUTS SEG # BLD: 11.8 K/UL (ref 1.7–8.2)
NEUTS SEG NFR BLD: 78 % (ref 43–78)
PLATELET # BLD AUTO: 372 K/UL (ref 150–450)
PMV BLD AUTO: 9.4 FL (ref 10.8–14.1)
PROT SERPL-MCNC: 8.2 G/DL (ref 6.3–8.2)
RBC # BLD AUTO: 3.83 M/UL (ref 4.23–5.67)
WBC # BLD AUTO: 15.2 K/UL (ref 4.3–11.1)

## 2018-07-23 PROCEDURE — 85025 COMPLETE CBC W/AUTO DIFF WBC: CPT | Performed by: NEUROLOGICAL SURGERY

## 2018-07-23 PROCEDURE — 82565 ASSAY OF CREATININE: CPT | Performed by: NEUROLOGICAL SURGERY

## 2018-07-23 PROCEDURE — 85652 RBC SED RATE AUTOMATED: CPT | Performed by: NEUROLOGICAL SURGERY

## 2018-07-23 PROCEDURE — 80076 HEPATIC FUNCTION PANEL: CPT | Performed by: NEUROLOGICAL SURGERY

## 2018-07-23 PROCEDURE — 86140 C-REACTIVE PROTEIN: CPT | Performed by: NEUROLOGICAL SURGERY

## 2018-07-23 PROCEDURE — G0299 HHS/HOSPICE OF RN EA 15 MIN: HCPCS

## 2018-07-25 ENCOUNTER — HOME CARE VISIT (OUTPATIENT)
Dept: SCHEDULING | Facility: HOME HEALTH | Age: 63
End: 2018-07-25
Payer: COMMERCIAL

## 2018-07-25 VITALS
DIASTOLIC BLOOD PRESSURE: 64 MMHG | TEMPERATURE: 98 F | SYSTOLIC BLOOD PRESSURE: 126 MMHG | OXYGEN SATURATION: 98 % | HEART RATE: 68 BPM | RESPIRATION RATE: 18 BRPM

## 2018-07-25 PROCEDURE — G0299 HHS/HOSPICE OF RN EA 15 MIN: HCPCS

## 2018-07-27 ENCOUNTER — HOME CARE VISIT (OUTPATIENT)
Dept: SCHEDULING | Facility: HOME HEALTH | Age: 63
End: 2018-07-27
Payer: COMMERCIAL

## 2018-07-27 VITALS
DIASTOLIC BLOOD PRESSURE: 68 MMHG | OXYGEN SATURATION: 98 % | RESPIRATION RATE: 18 BRPM | HEART RATE: 70 BPM | TEMPERATURE: 98 F | SYSTOLIC BLOOD PRESSURE: 128 MMHG

## 2018-07-27 PROCEDURE — G0299 HHS/HOSPICE OF RN EA 15 MIN: HCPCS

## 2018-07-30 ENCOUNTER — HOSPITAL ENCOUNTER (OUTPATIENT)
Dept: LAB | Age: 63
Discharge: HOME | End: 2018-07-30
Payer: COMMERCIAL

## 2018-07-30 ENCOUNTER — HOME CARE VISIT (OUTPATIENT)
Dept: SCHEDULING | Facility: HOME HEALTH | Age: 63
End: 2018-07-30
Payer: COMMERCIAL

## 2018-07-30 VITALS
HEART RATE: 68 BPM | OXYGEN SATURATION: 70 % | SYSTOLIC BLOOD PRESSURE: 128 MMHG | RESPIRATION RATE: 18 BRPM | DIASTOLIC BLOOD PRESSURE: 68 MMHG | TEMPERATURE: 98 F

## 2018-07-30 LAB
ALBUMIN SERPL-MCNC: 3.2 G/DL (ref 3.2–4.6)
ALBUMIN/GLOB SERPL: 0.7 {RATIO} (ref 1.2–3.5)
ALP SERPL-CCNC: 90 U/L (ref 50–136)
ALT SERPL-CCNC: 11 U/L (ref 12–65)
AST SERPL-CCNC: 27 U/L (ref 15–37)
BASOPHILS # BLD: 0.1 K/UL (ref 0–0.2)
BASOPHILS NFR BLD: 1 % (ref 0–2)
BILIRUB DIRECT SERPL-MCNC: <0.1 MG/DL
BILIRUB SERPL-MCNC: 0.1 MG/DL (ref 0.2–1.1)
CREAT SERPL-MCNC: 0.95 MG/DL (ref 0.8–1.5)
CRP SERPL-MCNC: 5.7 MG/DL (ref 0–0.9)
DIFFERENTIAL METHOD BLD: ABNORMAL
EOSINOPHIL # BLD: 0.4 K/UL (ref 0–0.8)
EOSINOPHIL NFR BLD: 4 % (ref 0.5–7.8)
ERYTHROCYTE [DISTWIDTH] IN BLOOD BY AUTOMATED COUNT: 16.4 % (ref 11.9–14.6)
ERYTHROCYTE [SEDIMENTATION RATE] IN BLOOD: 86 MM/HR (ref 0–20)
GLOBULIN SER CALC-MCNC: 4.4 G/DL (ref 2.3–3.5)
HCT VFR BLD AUTO: 35.1 % (ref 41.1–50.3)
HGB BLD-MCNC: 10.6 G/DL (ref 13.6–17.2)
IMM GRANULOCYTES # BLD: 0 K/UL (ref 0–0.5)
IMM GRANULOCYTES NFR BLD AUTO: 0 % (ref 0–5)
LYMPHOCYTES # BLD: 2.6 K/UL (ref 0.5–4.6)
LYMPHOCYTES NFR BLD: 28 % (ref 13–44)
MCH RBC QN AUTO: 28.6 PG (ref 26.1–32.9)
MCHC RBC AUTO-ENTMCNC: 30.2 G/DL (ref 31.4–35)
MCV RBC AUTO: 94.6 FL (ref 79.6–97.8)
MONOCYTES # BLD: 0.8 K/UL (ref 0.1–1.3)
MONOCYTES NFR BLD: 8 % (ref 4–12)
NEUTS SEG # BLD: 5.3 K/UL (ref 1.7–8.2)
NEUTS SEG NFR BLD: 59 % (ref 43–78)
PLATELET # BLD AUTO: 350 K/UL (ref 150–450)
PMV BLD AUTO: 9.4 FL (ref 10.8–14.1)
PROT SERPL-MCNC: 7.6 G/DL (ref 6.3–8.2)
RBC # BLD AUTO: 3.71 M/UL (ref 4.23–5.67)
WBC # BLD AUTO: 9.1 K/UL (ref 4.3–11.1)

## 2018-07-30 PROCEDURE — 80076 HEPATIC FUNCTION PANEL: CPT | Performed by: NEUROLOGICAL SURGERY

## 2018-07-30 PROCEDURE — 85025 COMPLETE CBC W/AUTO DIFF WBC: CPT | Performed by: NEUROLOGICAL SURGERY

## 2018-07-30 PROCEDURE — G0299 HHS/HOSPICE OF RN EA 15 MIN: HCPCS

## 2018-07-30 PROCEDURE — 85652 RBC SED RATE AUTOMATED: CPT | Performed by: NEUROLOGICAL SURGERY

## 2018-07-30 PROCEDURE — 82565 ASSAY OF CREATININE: CPT | Performed by: NEUROLOGICAL SURGERY

## 2018-07-30 PROCEDURE — 86140 C-REACTIVE PROTEIN: CPT | Performed by: NEUROLOGICAL SURGERY

## 2018-08-01 ENCOUNTER — HOME CARE VISIT (OUTPATIENT)
Dept: SCHEDULING | Facility: HOME HEALTH | Age: 63
End: 2018-08-01
Payer: COMMERCIAL

## 2018-08-01 VITALS
TEMPERATURE: 98 F | DIASTOLIC BLOOD PRESSURE: 68 MMHG | HEART RATE: 68 BPM | SYSTOLIC BLOOD PRESSURE: 132 MMHG | RESPIRATION RATE: 18 BRPM | OXYGEN SATURATION: 98 %

## 2018-08-01 PROCEDURE — G0299 HHS/HOSPICE OF RN EA 15 MIN: HCPCS

## 2018-08-03 ENCOUNTER — HOME CARE VISIT (OUTPATIENT)
Dept: SCHEDULING | Facility: HOME HEALTH | Age: 63
End: 2018-08-03
Payer: COMMERCIAL

## 2018-08-03 VITALS
DIASTOLIC BLOOD PRESSURE: 76 MMHG | RESPIRATION RATE: 16 BRPM | SYSTOLIC BLOOD PRESSURE: 142 MMHG | TEMPERATURE: 97.5 F | OXYGEN SATURATION: 98 % | HEART RATE: 88 BPM

## 2018-08-03 PROCEDURE — G0299 HHS/HOSPICE OF RN EA 15 MIN: HCPCS

## 2018-08-06 ENCOUNTER — HOME CARE VISIT (OUTPATIENT)
Dept: SCHEDULING | Facility: HOME HEALTH | Age: 63
End: 2018-08-06
Payer: COMMERCIAL

## 2018-08-06 VITALS
TEMPERATURE: 98.4 F | DIASTOLIC BLOOD PRESSURE: 72 MMHG | HEART RATE: 64 BPM | SYSTOLIC BLOOD PRESSURE: 128 MMHG | RESPIRATION RATE: 18 BRPM | OXYGEN SATURATION: 98 %

## 2018-08-06 PROCEDURE — G0299 HHS/HOSPICE OF RN EA 15 MIN: HCPCS

## 2018-08-08 ENCOUNTER — HOME CARE VISIT (OUTPATIENT)
Dept: SCHEDULING | Facility: HOME HEALTH | Age: 63
End: 2018-08-08
Payer: COMMERCIAL

## 2018-08-08 VITALS
HEART RATE: 68 BPM | OXYGEN SATURATION: 98 % | RESPIRATION RATE: 18 BRPM | TEMPERATURE: 97.8 F | DIASTOLIC BLOOD PRESSURE: 70 MMHG | SYSTOLIC BLOOD PRESSURE: 132 MMHG

## 2018-08-08 PROCEDURE — G0299 HHS/HOSPICE OF RN EA 15 MIN: HCPCS

## 2018-08-10 ENCOUNTER — HOME CARE VISIT (OUTPATIENT)
Dept: SCHEDULING | Facility: HOME HEALTH | Age: 63
End: 2018-08-10
Payer: COMMERCIAL

## 2018-08-10 PROCEDURE — G0299 HHS/HOSPICE OF RN EA 15 MIN: HCPCS

## 2018-08-12 VITALS
DIASTOLIC BLOOD PRESSURE: 78 MMHG | RESPIRATION RATE: 18 BRPM | SYSTOLIC BLOOD PRESSURE: 132 MMHG | OXYGEN SATURATION: 98 % | TEMPERATURE: 97.8 F | HEART RATE: 91 BPM

## 2018-08-13 ENCOUNTER — HOME CARE VISIT (OUTPATIENT)
Dept: SCHEDULING | Facility: HOME HEALTH | Age: 63
End: 2018-08-13
Payer: COMMERCIAL

## 2018-08-13 VITALS
OXYGEN SATURATION: 97 % | SYSTOLIC BLOOD PRESSURE: 130 MMHG | HEART RATE: 68 BPM | TEMPERATURE: 98.2 F | RESPIRATION RATE: 18 BRPM | DIASTOLIC BLOOD PRESSURE: 78 MMHG

## 2018-08-13 PROCEDURE — G0299 HHS/HOSPICE OF RN EA 15 MIN: HCPCS

## 2018-08-15 ENCOUNTER — HOME CARE VISIT (OUTPATIENT)
Dept: SCHEDULING | Facility: HOME HEALTH | Age: 63
End: 2018-08-15
Payer: COMMERCIAL

## 2018-08-15 VITALS
HEART RATE: 68 BPM | TEMPERATURE: 98.4 F | DIASTOLIC BLOOD PRESSURE: 68 MMHG | OXYGEN SATURATION: 98 % | SYSTOLIC BLOOD PRESSURE: 128 MMHG | RESPIRATION RATE: 18 BRPM

## 2018-08-15 PROCEDURE — G0299 HHS/HOSPICE OF RN EA 15 MIN: HCPCS

## 2018-08-17 ENCOUNTER — HOME CARE VISIT (OUTPATIENT)
Dept: SCHEDULING | Facility: HOME HEALTH | Age: 63
End: 2018-08-17
Payer: COMMERCIAL

## 2018-08-17 PROCEDURE — G0299 HHS/HOSPICE OF RN EA 15 MIN: HCPCS

## 2018-08-18 VITALS
DIASTOLIC BLOOD PRESSURE: 64 MMHG | HEART RATE: 68 BPM | RESPIRATION RATE: 18 BRPM | SYSTOLIC BLOOD PRESSURE: 128 MMHG | TEMPERATURE: 98.5 F | OXYGEN SATURATION: 98 %

## 2018-08-20 ENCOUNTER — HOME CARE VISIT (OUTPATIENT)
Dept: SCHEDULING | Facility: HOME HEALTH | Age: 63
End: 2018-08-20
Payer: COMMERCIAL

## 2018-08-20 VITALS
HEART RATE: 70 BPM | DIASTOLIC BLOOD PRESSURE: 78 MMHG | SYSTOLIC BLOOD PRESSURE: 132 MMHG | OXYGEN SATURATION: 98 % | TEMPERATURE: 98 F | RESPIRATION RATE: 18 BRPM

## 2018-08-20 PROCEDURE — G0299 HHS/HOSPICE OF RN EA 15 MIN: HCPCS

## 2018-08-22 ENCOUNTER — HOME CARE VISIT (OUTPATIENT)
Dept: SCHEDULING | Facility: HOME HEALTH | Age: 63
End: 2018-08-22
Payer: COMMERCIAL

## 2018-08-22 VITALS
TEMPERATURE: 98 F | OXYGEN SATURATION: 98 % | SYSTOLIC BLOOD PRESSURE: 132 MMHG | HEART RATE: 68 BPM | DIASTOLIC BLOOD PRESSURE: 78 MMHG | RESPIRATION RATE: 18 BRPM

## 2018-08-22 PROCEDURE — G0299 HHS/HOSPICE OF RN EA 15 MIN: HCPCS

## 2018-08-24 ENCOUNTER — HOME CARE VISIT (OUTPATIENT)
Dept: SCHEDULING | Facility: HOME HEALTH | Age: 63
End: 2018-08-24
Payer: COMMERCIAL

## 2018-08-24 VITALS
SYSTOLIC BLOOD PRESSURE: 132 MMHG | DIASTOLIC BLOOD PRESSURE: 78 MMHG | OXYGEN SATURATION: 98 % | HEART RATE: 70 BPM | TEMPERATURE: 98 F | RESPIRATION RATE: 18 BRPM

## 2018-08-24 PROCEDURE — G0299 HHS/HOSPICE OF RN EA 15 MIN: HCPCS

## 2018-08-27 ENCOUNTER — HOME CARE VISIT (OUTPATIENT)
Dept: SCHEDULING | Facility: HOME HEALTH | Age: 63
End: 2018-08-27
Payer: COMMERCIAL

## 2018-08-27 VITALS
OXYGEN SATURATION: 98 % | SYSTOLIC BLOOD PRESSURE: 124 MMHG | RESPIRATION RATE: 18 BRPM | DIASTOLIC BLOOD PRESSURE: 68 MMHG | TEMPERATURE: 98 F | HEART RATE: 78 BPM

## 2018-08-27 PROCEDURE — 400014 HH F/U

## 2018-08-27 PROCEDURE — G0299 HHS/HOSPICE OF RN EA 15 MIN: HCPCS

## 2018-08-29 ENCOUNTER — HOME CARE VISIT (OUTPATIENT)
Dept: SCHEDULING | Facility: HOME HEALTH | Age: 63
End: 2018-08-29
Payer: COMMERCIAL

## 2018-08-29 VITALS
HEART RATE: 68 BPM | OXYGEN SATURATION: 98 % | TEMPERATURE: 98 F | SYSTOLIC BLOOD PRESSURE: 138 MMHG | DIASTOLIC BLOOD PRESSURE: 68 MMHG | RESPIRATION RATE: 18 BRPM

## 2018-08-29 PROCEDURE — G0299 HHS/HOSPICE OF RN EA 15 MIN: HCPCS

## 2018-08-31 ENCOUNTER — HOME CARE VISIT (OUTPATIENT)
Dept: SCHEDULING | Facility: HOME HEALTH | Age: 63
End: 2018-08-31
Payer: COMMERCIAL

## 2018-08-31 VITALS
HEART RATE: 68 BPM | RESPIRATION RATE: 18 BRPM | TEMPERATURE: 98.2 F | OXYGEN SATURATION: 98 % | SYSTOLIC BLOOD PRESSURE: 128 MMHG | DIASTOLIC BLOOD PRESSURE: 72 MMHG

## 2018-08-31 PROCEDURE — A4649 SURGICAL SUPPLIES: HCPCS

## 2018-08-31 PROCEDURE — A5120 SKIN BARRIER, WIPE OR SWAB: HCPCS

## 2018-08-31 PROCEDURE — A6216 NON-STERILE GAUZE<=16 SQ IN: HCPCS

## 2018-08-31 PROCEDURE — A6402 STERILE GAUZE <= 16 SQ IN: HCPCS

## 2018-08-31 PROCEDURE — G0299 HHS/HOSPICE OF RN EA 15 MIN: HCPCS

## 2018-09-03 ENCOUNTER — HOME CARE VISIT (OUTPATIENT)
Dept: SCHEDULING | Facility: HOME HEALTH | Age: 63
End: 2018-09-03
Payer: COMMERCIAL

## 2018-09-03 VITALS
TEMPERATURE: 97.8 F | DIASTOLIC BLOOD PRESSURE: 70 MMHG | HEART RATE: 68 BPM | RESPIRATION RATE: 18 BRPM | SYSTOLIC BLOOD PRESSURE: 132 MMHG | OXYGEN SATURATION: 98 %

## 2018-09-03 PROCEDURE — G0299 HHS/HOSPICE OF RN EA 15 MIN: HCPCS

## 2018-09-05 ENCOUNTER — HOME CARE VISIT (OUTPATIENT)
Dept: SCHEDULING | Facility: HOME HEALTH | Age: 63
End: 2018-09-05
Payer: COMMERCIAL

## 2018-09-05 VITALS
TEMPERATURE: 98.4 F | RESPIRATION RATE: 18 BRPM | SYSTOLIC BLOOD PRESSURE: 128 MMHG | OXYGEN SATURATION: 98 % | HEART RATE: 68 BPM | DIASTOLIC BLOOD PRESSURE: 68 MMHG

## 2018-09-05 PROCEDURE — G0299 HHS/HOSPICE OF RN EA 15 MIN: HCPCS

## 2018-09-07 ENCOUNTER — HOME CARE VISIT (OUTPATIENT)
Dept: SCHEDULING | Facility: HOME HEALTH | Age: 63
End: 2018-09-07
Payer: COMMERCIAL

## 2018-09-07 VITALS
HEART RATE: 70 BPM | TEMPERATURE: 98 F | OXYGEN SATURATION: 98 % | DIASTOLIC BLOOD PRESSURE: 70 MMHG | SYSTOLIC BLOOD PRESSURE: 130 MMHG | RESPIRATION RATE: 18 BRPM

## 2018-09-07 PROCEDURE — G0299 HHS/HOSPICE OF RN EA 15 MIN: HCPCS

## 2018-09-10 ENCOUNTER — HOME CARE VISIT (OUTPATIENT)
Dept: SCHEDULING | Facility: HOME HEALTH | Age: 63
End: 2018-09-10
Payer: COMMERCIAL

## 2018-09-10 VITALS
RESPIRATION RATE: 18 BRPM | DIASTOLIC BLOOD PRESSURE: 78 MMHG | SYSTOLIC BLOOD PRESSURE: 134 MMHG | HEART RATE: 68 BPM | TEMPERATURE: 98 F | OXYGEN SATURATION: 98 %

## 2018-09-10 PROCEDURE — G0299 HHS/HOSPICE OF RN EA 15 MIN: HCPCS

## 2018-09-12 ENCOUNTER — HOME CARE VISIT (OUTPATIENT)
Dept: SCHEDULING | Facility: HOME HEALTH | Age: 63
End: 2018-09-12
Payer: COMMERCIAL

## 2018-09-12 VITALS
SYSTOLIC BLOOD PRESSURE: 134 MMHG | RESPIRATION RATE: 18 BRPM | OXYGEN SATURATION: 98 % | TEMPERATURE: 97.8 F | DIASTOLIC BLOOD PRESSURE: 76 MMHG | HEART RATE: 66 BPM

## 2018-09-12 PROCEDURE — G0299 HHS/HOSPICE OF RN EA 15 MIN: HCPCS

## 2018-09-14 ENCOUNTER — HOME CARE VISIT (OUTPATIENT)
Dept: SCHEDULING | Facility: HOME HEALTH | Age: 63
End: 2018-09-14
Payer: COMMERCIAL

## 2018-09-14 VITALS
HEART RATE: 78 BPM | DIASTOLIC BLOOD PRESSURE: 74 MMHG | RESPIRATION RATE: 18 BRPM | OXYGEN SATURATION: 98 % | SYSTOLIC BLOOD PRESSURE: 132 MMHG | TEMPERATURE: 98.6 F

## 2018-09-14 PROCEDURE — G0299 HHS/HOSPICE OF RN EA 15 MIN: HCPCS

## 2018-09-16 ENCOUNTER — HOME CARE VISIT (OUTPATIENT)
Dept: HOME HEALTH SERVICES | Facility: HOME HEALTH | Age: 63
End: 2018-09-16
Payer: COMMERCIAL

## 2018-09-17 ENCOUNTER — HOME CARE VISIT (OUTPATIENT)
Dept: SCHEDULING | Facility: HOME HEALTH | Age: 63
End: 2018-09-17
Payer: COMMERCIAL

## 2018-09-17 VITALS
SYSTOLIC BLOOD PRESSURE: 132 MMHG | OXYGEN SATURATION: 98 % | DIASTOLIC BLOOD PRESSURE: 78 MMHG | RESPIRATION RATE: 18 BRPM | TEMPERATURE: 98 F | HEART RATE: 68 BPM

## 2018-09-17 PROCEDURE — G0299 HHS/HOSPICE OF RN EA 15 MIN: HCPCS

## 2018-09-19 ENCOUNTER — HOME CARE VISIT (OUTPATIENT)
Dept: SCHEDULING | Facility: HOME HEALTH | Age: 63
End: 2018-09-19
Payer: COMMERCIAL

## 2018-09-19 VITALS
RESPIRATION RATE: 18 BRPM | HEART RATE: 68 BPM | OXYGEN SATURATION: 98 % | TEMPERATURE: 98.2 F | DIASTOLIC BLOOD PRESSURE: 78 MMHG | SYSTOLIC BLOOD PRESSURE: 132 MMHG

## 2018-09-19 PROCEDURE — G0299 HHS/HOSPICE OF RN EA 15 MIN: HCPCS

## 2018-09-21 ENCOUNTER — HOME CARE VISIT (OUTPATIENT)
Dept: SCHEDULING | Facility: HOME HEALTH | Age: 63
End: 2018-09-21
Payer: COMMERCIAL

## 2018-09-21 PROCEDURE — G0299 HHS/HOSPICE OF RN EA 15 MIN: HCPCS

## 2018-09-22 VITALS
DIASTOLIC BLOOD PRESSURE: 70 MMHG | RESPIRATION RATE: 18 BRPM | HEART RATE: 68 BPM | TEMPERATURE: 98 F | SYSTOLIC BLOOD PRESSURE: 130 MMHG | OXYGEN SATURATION: 98 %

## 2018-09-24 ENCOUNTER — HOME CARE VISIT (OUTPATIENT)
Dept: SCHEDULING | Facility: HOME HEALTH | Age: 63
End: 2018-09-24
Payer: COMMERCIAL

## 2018-09-24 VITALS
DIASTOLIC BLOOD PRESSURE: 78 MMHG | SYSTOLIC BLOOD PRESSURE: 134 MMHG | HEART RATE: 74 BPM | OXYGEN SATURATION: 98 % | RESPIRATION RATE: 18 BRPM | TEMPERATURE: 97.8 F

## 2018-09-24 PROCEDURE — G0299 HHS/HOSPICE OF RN EA 15 MIN: HCPCS

## 2018-09-26 ENCOUNTER — HOME CARE VISIT (OUTPATIENT)
Dept: SCHEDULING | Facility: HOME HEALTH | Age: 63
End: 2018-09-26
Payer: COMMERCIAL

## 2018-09-26 VITALS
RESPIRATION RATE: 18 BRPM | DIASTOLIC BLOOD PRESSURE: 78 MMHG | SYSTOLIC BLOOD PRESSURE: 128 MMHG | HEART RATE: 68 BPM | TEMPERATURE: 98.2 F | OXYGEN SATURATION: 98 %

## 2018-09-26 PROCEDURE — G0299 HHS/HOSPICE OF RN EA 15 MIN: HCPCS

## 2018-09-27 ENCOUNTER — HOME CARE VISIT (OUTPATIENT)
Dept: SCHEDULING | Facility: HOME HEALTH | Age: 63
End: 2018-09-27
Payer: COMMERCIAL

## 2018-09-27 VITALS
RESPIRATION RATE: 18 BRPM | TEMPERATURE: 97.8 F | HEART RATE: 74 BPM | OXYGEN SATURATION: 98 % | SYSTOLIC BLOOD PRESSURE: 132 MMHG | DIASTOLIC BLOOD PRESSURE: 74 MMHG

## 2018-09-27 PROCEDURE — G0299 HHS/HOSPICE OF RN EA 15 MIN: HCPCS

## 2018-10-01 ENCOUNTER — HOME CARE VISIT (OUTPATIENT)
Dept: SCHEDULING | Facility: HOME HEALTH | Age: 63
End: 2018-10-01
Payer: COMMERCIAL

## 2018-10-01 VITALS
TEMPERATURE: 97.8 F | RESPIRATION RATE: 18 BRPM | HEART RATE: 68 BPM | DIASTOLIC BLOOD PRESSURE: 68 MMHG | SYSTOLIC BLOOD PRESSURE: 128 MMHG | OXYGEN SATURATION: 98 %

## 2018-10-01 PROCEDURE — G0299 HHS/HOSPICE OF RN EA 15 MIN: HCPCS

## 2018-10-01 PROCEDURE — A6402 STERILE GAUZE <= 16 SQ IN: HCPCS

## 2018-10-01 PROCEDURE — A6260 WOUND CLEANSER ANY TYPE/SIZE: HCPCS

## 2018-10-01 PROCEDURE — A4452 WATERPROOF TAPE: HCPCS

## 2018-10-03 ENCOUNTER — HOME CARE VISIT (OUTPATIENT)
Dept: SCHEDULING | Facility: HOME HEALTH | Age: 63
End: 2018-10-03
Payer: COMMERCIAL

## 2018-10-03 PROCEDURE — A4452 WATERPROOF TAPE: HCPCS

## 2018-10-03 PROCEDURE — A6402 STERILE GAUZE <= 16 SQ IN: HCPCS

## 2018-10-03 PROCEDURE — A6260 WOUND CLEANSER ANY TYPE/SIZE: HCPCS

## 2018-10-03 PROCEDURE — G0299 HHS/HOSPICE OF RN EA 15 MIN: HCPCS

## 2018-10-04 ENCOUNTER — HOME CARE VISIT (OUTPATIENT)
Dept: HOME HEALTH SERVICES | Facility: HOME HEALTH | Age: 63
End: 2018-10-04
Payer: COMMERCIAL

## 2018-10-04 VITALS
HEART RATE: 70 BPM | TEMPERATURE: 98 F | DIASTOLIC BLOOD PRESSURE: 73 MMHG | OXYGEN SATURATION: 98 % | RESPIRATION RATE: 18 BRPM | SYSTOLIC BLOOD PRESSURE: 128 MMHG

## 2018-10-09 ENCOUNTER — HOME CARE VISIT (OUTPATIENT)
Dept: SCHEDULING | Facility: HOME HEALTH | Age: 63
End: 2018-10-09
Payer: COMMERCIAL

## 2018-10-09 VITALS
OXYGEN SATURATION: 98 % | HEART RATE: 82 BPM | SYSTOLIC BLOOD PRESSURE: 132 MMHG | DIASTOLIC BLOOD PRESSURE: 78 MMHG | TEMPERATURE: 97.7 F | RESPIRATION RATE: 18 BRPM

## 2018-10-09 PROCEDURE — G0299 HHS/HOSPICE OF RN EA 15 MIN: HCPCS

## 2018-10-11 ENCOUNTER — HOME CARE VISIT (OUTPATIENT)
Dept: SCHEDULING | Facility: HOME HEALTH | Age: 63
End: 2018-10-11
Payer: COMMERCIAL

## 2018-10-12 ENCOUNTER — HOME CARE VISIT (OUTPATIENT)
Dept: SCHEDULING | Facility: HOME HEALTH | Age: 63
End: 2018-10-12
Payer: COMMERCIAL

## 2018-10-12 VITALS
TEMPERATURE: 97.8 F | RESPIRATION RATE: 18 BRPM | OXYGEN SATURATION: 98 % | SYSTOLIC BLOOD PRESSURE: 134 MMHG | HEART RATE: 68 BPM | DIASTOLIC BLOOD PRESSURE: 70 MMHG

## 2018-10-12 PROCEDURE — G0299 HHS/HOSPICE OF RN EA 15 MIN: HCPCS

## 2018-10-15 ENCOUNTER — HOME CARE VISIT (OUTPATIENT)
Dept: SCHEDULING | Facility: HOME HEALTH | Age: 63
End: 2018-10-15
Payer: COMMERCIAL

## 2018-10-15 VITALS
DIASTOLIC BLOOD PRESSURE: 74 MMHG | OXYGEN SATURATION: 98 % | HEART RATE: 74 BPM | SYSTOLIC BLOOD PRESSURE: 128 MMHG | TEMPERATURE: 98 F | RESPIRATION RATE: 18 BRPM

## 2018-10-15 PROCEDURE — G0299 HHS/HOSPICE OF RN EA 15 MIN: HCPCS

## 2018-10-17 ENCOUNTER — HOME CARE VISIT (OUTPATIENT)
Dept: SCHEDULING | Facility: HOME HEALTH | Age: 63
End: 2018-10-17
Payer: COMMERCIAL

## 2018-10-17 VITALS
HEART RATE: 70 BPM | DIASTOLIC BLOOD PRESSURE: 78 MMHG | OXYGEN SATURATION: 98 % | SYSTOLIC BLOOD PRESSURE: 134 MMHG | RESPIRATION RATE: 18 BRPM | TEMPERATURE: 98 F

## 2018-10-17 PROCEDURE — G0299 HHS/HOSPICE OF RN EA 15 MIN: HCPCS

## 2018-10-17 PROCEDURE — A6022 COLLAGEN DRSG>16<=48 SQ IN: HCPCS

## 2018-10-17 PROCEDURE — A4649 SURGICAL SUPPLIES: HCPCS

## 2018-10-19 ENCOUNTER — HOME CARE VISIT (OUTPATIENT)
Dept: SCHEDULING | Facility: HOME HEALTH | Age: 63
End: 2018-10-19
Payer: COMMERCIAL

## 2018-10-19 PROCEDURE — G0299 HHS/HOSPICE OF RN EA 15 MIN: HCPCS

## 2018-10-20 VITALS
TEMPERATURE: 98 F | OXYGEN SATURATION: 98 % | HEART RATE: 73 BPM | SYSTOLIC BLOOD PRESSURE: 132 MMHG | RESPIRATION RATE: 18 BRPM | DIASTOLIC BLOOD PRESSURE: 78 MMHG

## 2018-10-22 ENCOUNTER — HOME CARE VISIT (OUTPATIENT)
Dept: SCHEDULING | Facility: HOME HEALTH | Age: 63
End: 2018-10-22
Payer: COMMERCIAL

## 2018-10-24 ENCOUNTER — HOME CARE VISIT (OUTPATIENT)
Dept: SCHEDULING | Facility: HOME HEALTH | Age: 63
End: 2018-10-24
Payer: COMMERCIAL

## 2018-12-07 ENCOUNTER — HOSPITAL ENCOUNTER (OUTPATIENT)
Dept: MRI IMAGING | Age: 63
Discharge: HOME | End: 2018-12-07
Attending: NEUROLOGICAL SURGERY
Payer: MEDICARE

## 2018-12-07 DIAGNOSIS — M48.062 LUMBAR STENOSIS WITH NEUROGENIC CLAUDICATION: ICD-10-CM

## 2018-12-07 PROCEDURE — 72158 MRI LUMBAR SPINE W/O & W/DYE: CPT

## 2018-12-07 PROCEDURE — 74011250636 HC RX REV CODE- 250/636: Performed by: NEUROLOGICAL SURGERY

## 2018-12-07 PROCEDURE — A9575 INJ GADOTERATE MEGLUMI 0.1ML: HCPCS | Performed by: NEUROLOGICAL SURGERY

## 2018-12-07 RX ORDER — GADOTERATE MEGLUMINE 376.9 MG/ML
20 INJECTION INTRAVENOUS
Status: COMPLETED | OUTPATIENT
Start: 2018-12-07 | End: 2018-12-07

## 2018-12-07 RX ORDER — SODIUM CHLORIDE 0.9 % (FLUSH) 0.9 %
10 SYRINGE (ML) INJECTION
Status: COMPLETED | OUTPATIENT
Start: 2018-12-07 | End: 2018-12-07

## 2018-12-07 RX ADMIN — GADOTERATE MEGLUMINE 20 ML: 376.9 INJECTION INTRAVENOUS at 14:11

## 2018-12-07 RX ADMIN — Medication 10 ML: at 14:11

## 2018-12-27 PROBLEM — I87.2 VENOUS STASIS DERMATITIS OF BOTH LOWER EXTREMITIES: Status: ACTIVE | Noted: 2018-12-27

## 2018-12-27 PROBLEM — I87.2 VENOUS INSUFFICIENCY OF BOTH LOWER EXTREMITIES: Status: ACTIVE | Noted: 2018-12-27

## 2019-01-28 NOTE — DISCHARGE INSTRUCTIONS
[facility]  [address], [address], [address] [facility]  [phone]    Postoperative Home Instructions  Lumbar (Back) Surgery    · Showering: You may shower the first day you are home with the dressing on. If the dressing becomes saturated, change it completely to a similar dressing. Leave the steri-strips or glue in place. After 3 days, you may take the dressing off and leave it off. If you have the brown aquacel dressing, leave it alone for 5 days and then you may remove the dressing. Do not soak in a tub, and use only soap and water on the wound. · Wound Care: A small to moderate amount of reddish drainage on the dressing is normal the first 1-2 days after surgery. If the dressing becomes saturated, change it. Once the steri-strips begin to peel up on their own, you may gently take them off. Large amounts of clear, watery drainage is not normal and you should call our office immediately. · Signs of Infection: Extreme tenderness at the wound, excessive redness and/or swelling, or ugly yellowish-greenish drainage from the wound. Fever greater than 100.5 may be present. If you think you have a wound infection, call our office immediately. · Driving: You may not begin driving until after your visit to our office for a wound and suture check which is normally 7-10 days after you come home from the hospital.    · Medications: You should take anti-inflammatory medications such as Motrin, Celebrex for 30 days after surgery, every day, on a regular schedule only if prescribed by your physician. The pain medicine prescribed may be taken as needed. You should take a stool softener (Colace) twice a day, drink lots of water and eat high fiber foods to avoid constipation (this is a common problem with pain medicine.)    · Deep Breathing Exercises: Continue to do your incentive spirometry and/or deep breathing exercises to prevent risk of pneumonia.     · Smoking: YOU MAY negative NOT SMOKE! Smoking will interfere with your healing. If you smoke, you may end up with having another surgery or more problems! · Activity: No heavy lifting for 4 weeks after surgery. This means anything heavier than a coffee cup or newspaper. After 4 weeks, you may gradually begin lifting heavier things. Avoid bending, stooping, or twisting at the waist.  Do not lie on your stomach to sleep. · You may remove your [place] hose when consistently walking. You may do steps and inclines in moderation. · Sexual Relations: You may resume sexual relations 2 weeks after your surgery. · Walking Program: You should begin walking every day on the first day after surgery. Start for short distances, then go a little farther each day. You should eventually walk 1-2 miles every day for the long term. This is very important in your recovery period because walking strengthens the spinal muscles and will help protect your disc and vertebrae. · Symptoms after Surgery: Dont be alarmed if you still have some symptoms after surgery. The nerves often require time to heal after the pressure has been taken off. Be patient, you should see improvement with time. · Follow-up: You will need to call our office for an appointment to see a nurse one week after surgery for a wound check. An appointment will be made then for you to see your surgeon about 4 weeks after surgery. Please call our office if you have any other questions or problems. Listen to your body; it will tell you if you are overdoing it. Use common sense and take care of yourself!      After general anesthesia or intravenous sedation, for 24 hours or while taking prescription Narcotics:  · Limit your activities  · Do not drive and operate hazardous machinery  · Do not make important personal or business decisions  · Do  not drink alcoholic beverages  · If you have not urinated within 8 hours after discharge, please contact your surgeon on call.    *  Please give a list of your current medications to your Primary Care Provider. *  Please update this list whenever your medications are discontinued, doses are      changed, or new medications (including over-the-counter products) are added. *  Please carry medication information at all times in case of emergency situations. These are general instructions for a healthy lifestyle:  No smoking/ No tobacco products/ Avoid exposure to second hand smoke  Surgeon General's Warning:  Quitting smoking now greatly reduces serious risk to your health. Obesity, smoking, and sedentary lifestyle greatly increases your risk for illness  A healthy diet, regular physical exercise & weight monitoring are important for maintaining a healthy lifestyle    Recognize signs and symptoms of STROKE:  F-face looks uneven  A-arms unable to move or move unevenly  S-speech slurred or non-existent  T-time-call 911 as soon as signs and symptoms begin-DO NOT go       Back to bed or wait to see if you get better-TIME IS BRAIN.

## 2019-06-26 ENCOUNTER — HOSPITAL ENCOUNTER (OUTPATIENT)
Dept: ULTRASOUND IMAGING | Age: 64
Discharge: HOME | End: 2019-06-26
Attending: FAMILY MEDICINE
Payer: MEDICARE

## 2019-06-26 DIAGNOSIS — I73.9 INTERMITTENT CLAUDICATION (HCC): ICD-10-CM

## 2019-06-26 PROCEDURE — 93978 VASCULAR STUDY: CPT

## 2020-07-09 ENCOUNTER — HOSPITAL ENCOUNTER (OUTPATIENT)
Dept: ULTRASOUND IMAGING | Age: 65
Discharge: HOME | End: 2020-07-09
Attending: FAMILY MEDICINE
Payer: MEDICARE

## 2020-07-09 DIAGNOSIS — N18.4 CHRONIC KIDNEY DISEASE, STAGE IV (SEVERE) (HCC): ICD-10-CM

## 2020-07-09 PROCEDURE — 76770 US EXAM ABDO BACK WALL COMP: CPT

## 2020-12-27 ENCOUNTER — HOSPITAL ENCOUNTER (EMERGENCY)
Age: 65
Discharge: HOME | End: 2020-12-27
Attending: EMERGENCY MEDICINE
Payer: MEDICARE

## 2020-12-27 VITALS
DIASTOLIC BLOOD PRESSURE: 86 MMHG | HEART RATE: 99 BPM | RESPIRATION RATE: 16 BRPM | HEIGHT: 68 IN | TEMPERATURE: 98.1 F | WEIGHT: 240 LBS | SYSTOLIC BLOOD PRESSURE: 180 MMHG | OXYGEN SATURATION: 97 % | BODY MASS INDEX: 36.37 KG/M2

## 2020-12-27 DIAGNOSIS — K22.2 ESOPHAGEAL OBSTRUCTION DUE TO FOOD IMPACTION: Primary | ICD-10-CM

## 2020-12-27 DIAGNOSIS — T18.128A ESOPHAGEAL OBSTRUCTION DUE TO FOOD IMPACTION: Primary | ICD-10-CM

## 2020-12-27 PROCEDURE — 96375 TX/PRO/DX INJ NEW DRUG ADDON: CPT

## 2020-12-27 PROCEDURE — 99283 EMERGENCY DEPT VISIT LOW MDM: CPT

## 2020-12-27 PROCEDURE — 74011250636 HC RX REV CODE- 250/636: Performed by: EMERGENCY MEDICINE

## 2020-12-27 PROCEDURE — 96374 THER/PROPH/DIAG INJ IV PUSH: CPT

## 2020-12-27 PROCEDURE — 74011250637 HC RX REV CODE- 250/637: Performed by: EMERGENCY MEDICINE

## 2020-12-27 RX ORDER — METOCLOPRAMIDE HYDROCHLORIDE 5 MG/ML
10 INJECTION INTRAMUSCULAR; INTRAVENOUS
Status: COMPLETED | OUTPATIENT
Start: 2020-12-27 | End: 2020-12-27

## 2020-12-27 RX ORDER — NITROGLYCERIN 0.4 MG/1
0.4 TABLET SUBLINGUAL
Status: DISCONTINUED | OUTPATIENT
Start: 2020-12-27 | End: 2020-12-27 | Stop reason: HOSPADM

## 2020-12-27 RX ADMIN — METOCLOPRAMIDE 10 MG: 5 INJECTION, SOLUTION INTRAMUSCULAR; INTRAVENOUS at 16:31

## 2020-12-27 RX ADMIN — NITROGLYCERIN 0.4 MG: 0.4 TABLET SUBLINGUAL at 17:04

## 2020-12-27 RX ADMIN — NITROGLYCERIN 0.4 MG: 0.4 TABLET SUBLINGUAL at 17:14

## 2020-12-27 RX ADMIN — GLUCAGON 1 MG: 1 INJECTION, POWDER, LYOPHILIZED, FOR SOLUTION INTRAMUSCULAR; INTRAVENOUS at 16:31

## 2020-12-27 NOTE — ED TRIAGE NOTES
Pt reports food bolus, states that he thinks it is ham. Reports this happens multiple times. Unable to swallow water.

## 2020-12-27 NOTE — ED NOTES
I have reviewed discharge instructions with the patient. The patient verbalized understanding. Patient left ED via Discharge Method: ambulatory to Home with (self). Opportunity for questions and clarification provided. Patient given 0 scripts. No e-sign. To continue your aftercare when you leave the hospital, you may receive an automated call from our care team to check in on how you are doing. This is a free service and part of our promise to provide the best care and service to meet your aftercare needs.  If you have questions, or wish to unsubscribe from this service please call [phone]. Thank you for Choosing our [address] [place] [address] Emergency Department.

## 2020-12-27 NOTE — ED PROVIDER NOTES
Chief complaint : Food bolus impaction    HISTORY OF PRESENT ILLNESS :  Location : Esophageal    Quality : Ham    Quantity : QS    Timing : Couple of days, since Christmas December 25    Severity : Moderate    Context : Numerous prior esophageal food bolus impactions    Alleviating / exacerbating factors : Unyielding despite multiple attempts to drink [employer]    Associated Symptoms : Vomiting without nausea  Starting to get dizzy and lightheaded from inability to keep any fluids down    No respiratory difficulty             Past Medical History:   Diagnosis Date    Adverse effect of anesthesia     wife states had delayed awaken    Anxiety     takes alprazolam daily    Arthritis     Edema     BLE; takes spironolactone daily    GERD (gastroesophageal reflux disease)     managed w/med    Inguinal hernia     left per pt    PUD (peptic ulcer disease) 40 yrs ago    pt states no current issues    Severe obesity (BMI 35.0-39. 9) with comorbidity ([address] 4/24/2018    Status post left hip replacement 10/14/2015    Stroke [facility]) 2002    pt states still limps w/right leg, has problems with speech & problems writing at times       Past Surgical History:   Procedure Laterality Date    HX ADENOIDECTOMY      HX APPENDECTOMY      HX CAROTID ENDARTERECTOMY Bilateral     HX ENDOSCOPY      multiple with dilation    HX GI      esophageal dilation    HX HERNIA REPAIR      HX HIP REPLACEMENT      HX ORTHOPAEDIC Right     knee    HX ORTHOPAEDIC Left     thumb; bone removed    HX ORTHOPAEDIC Right     middle finger sx    HX ORTHOPAEDIC  05/30/2018    lumbar surgery, bilateral laminectomy    HX TONSILLECTOMY      HX UROLOGICAL      testicular sx due to injury as a child         Family History:   Problem Relation Age of Onset   [address] Arthritis-osteo Mother     Diabetes Mother     Diabetes Father     Arthritis-osteo Father     Cancer Maternal Grandmother     Breast Cancer Neg Hx        Social History     Socioeconomic History    Marital status:      Spouse name: Not on file    Number of children: Not on file    Years of education: Not on file    Highest education level: Not on file   Occupational History    Not on file   Social Needs    Financial resource strain: Not on file    Food insecurity     Worry: Not on file     Inability: Not on file    Transportation needs     Medical: Not on file     Non-medical: Not on file   Tobacco Use    Smoking status: Former Smoker     Packs/day: 1.50     Years: 20.00     Pack years: 30.00     Quit date: 2002     Years since quittin.6    Smokeless tobacco: Never Used    Tobacco comment: quit smoking in    Substance and Sexual Activity    Alcohol use: No     Comment: pt was drinking wine in the evening but stopped last week due to surgery    Drug use: No    Sexual activity: Not on file   Lifestyle    Physical activity     Days per week: Not on file     Minutes per session: Not on file    Stress: Not on file   Relationships    Social connections     Talks on phone: Not on file     Gets together: Not on file     Attends Nondenominational service: Not on file     Active member of club or organization: Not on file     Attends meetings of clubs or organizations: Not on file     Relationship status: Not on file    Intimate partner violence     Fear of current or ex partner: Not on file     Emotionally abused: Not on file     Physically abused: Not on file     Forced sexual activity: Not on file   Other Topics Concern    Not on file   Social History Narrative    Not on file         ALLERGIES: Beet    Review of Systems   Constitutional: Negative for chills and fever. HENT: Positive for trouble swallowing. Negative for rhinorrhea and sore throat. Eyes: Negative for discharge and redness. Respiratory: Negative for cough, choking, shortness of breath and stridor. Cardiovascular: Negative for chest pain and palpitations. Gastrointestinal: Positive for vomiting. Negative for abdominal pain and nausea. Musculoskeletal: Negative for neck pain and neck stiffness. Skin: Negative for rash. Neurological: Negative for dizziness and headaches. All other systems reviewed and are negative. Vitals:    12/27/20 1418   BP: (!) 162/85   Pulse: (!) 106   Resp: 20   Temp: 98.1 °F (36.7 °C)   SpO2: 96%   Weight: 108.9 kg (240 lb)   Height: 5' 8\" (1.727 m)            Physical Exam  Vitals signs and nursing note reviewed. Constitutional:       General: He is not in acute distress. Appearance: Normal appearance. He is well-developed. He is not ill-appearing, toxic-appearing or diaphoretic. HENT:      Head: Normocephalic and atraumatic. Right Ear: External ear normal.      Left Ear: External ear normal.   Eyes:      General:         Right eye: No discharge. Left eye: No discharge. Conjunctiva/sclera: Conjunctivae normal.   Neck:      Musculoskeletal: Normal range of motion and neck supple. No muscular tenderness. Cardiovascular:      Rate and Rhythm: Regular rhythm. Tachycardia present. Pulmonary:      Effort: Pulmonary effort is normal. No respiratory distress. Breath sounds: Normal breath sounds. No stridor. Abdominal:      General: Abdomen is flat. There is no distension. Tenderness: There is no abdominal tenderness. There is no guarding. Musculoskeletal: Normal range of motion. Skin:     General: Skin is warm and dry. Findings: No rash. Neurological:      General: No focal deficit present. Mental Status: He is alert and oriented to person, place, and time. Mental status is at baseline. Motor: No abnormal muscle tone.       Comments: cni 2-12 grossly  Nl gait,  Nl speech     Psychiatric:         Mood and Affect: Mood normal.         Behavior: Behavior normal.          MDM  Number of Diagnoses or Management Options  Esophageal obstruction due to food impaction  Diagnosis management comments: Medical decision making note:  Esophageal food impaction, will make one attempt with glucagon, Reglan, nitroglycerin cocktail, if no relief will page GI for EGD. After second nitroglycerin patient feels the food bolus is passed, he has been able to drink 2 full cups of water with severe regurgitation  This concludes the \"medical decision making note\" part of this emergency department visit note.          Amount and/or Complexity of Data Reviewed  Decide to obtain previous medical records or to obtain history from someone other than the patient: yes    Risk of Complications, Morbidity, and/or Mortality  Presenting problems: moderate  Diagnostic procedures: minimal  Management options: moderate    Patient Progress  Patient progress: improved         Procedures

## 2021-07-06 ENCOUNTER — APPOINTMENT (OUTPATIENT)
Dept: GENERAL RADIOLOGY | Age: 66
End: 2021-07-06
Attending: EMERGENCY MEDICINE
Payer: MEDICARE

## 2021-07-06 ENCOUNTER — TRANSCRIBE ORDER (OUTPATIENT)
Dept: SCHEDULING | Age: 66
End: 2021-07-06

## 2021-07-06 ENCOUNTER — HOSPITAL ENCOUNTER (EMERGENCY)
Age: 66
Discharge: HOME | End: 2021-07-06
Attending: EMERGENCY MEDICINE | Admitting: EMERGENCY MEDICINE
Payer: MEDICARE

## 2021-07-06 VITALS
TEMPERATURE: 98.6 F | DIASTOLIC BLOOD PRESSURE: 95 MMHG | WEIGHT: 260 LBS | HEART RATE: 102 BPM | OXYGEN SATURATION: 96 % | RESPIRATION RATE: 18 BRPM | BODY MASS INDEX: 39.4 KG/M2 | SYSTOLIC BLOOD PRESSURE: 184 MMHG | HEIGHT: 68 IN

## 2021-07-06 DIAGNOSIS — T18.128A ESOPHAGEAL OBSTRUCTION DUE TO FOOD IMPACTION: Primary | ICD-10-CM

## 2021-07-06 DIAGNOSIS — K22.2 ESOPHAGEAL OBSTRUCTION DUE TO FOOD IMPACTION: Primary | ICD-10-CM

## 2021-07-06 DIAGNOSIS — Z87.891 PERSONAL HISTORY OF TOBACCO USE, PRESENTING HAZARDS TO HEALTH: Primary | ICD-10-CM

## 2021-07-06 LAB
ALBUMIN SERPL-MCNC: 3.9 G/DL (ref 3.2–4.6)
ALBUMIN/GLOB SERPL: 0.9 {RATIO} (ref 1.2–3.5)
ALP SERPL-CCNC: 53 U/L (ref 50–136)
ALT SERPL-CCNC: 62 U/L (ref 12–65)
ANION GAP SERPL CALC-SCNC: 5 MMOL/L (ref 7–16)
AST SERPL-CCNC: 81 U/L (ref 15–37)
BASOPHILS # BLD: 0.1 K/UL (ref 0–0.2)
BASOPHILS NFR BLD: 1 % (ref 0–2)
BILIRUB SERPL-MCNC: 0.6 MG/DL (ref 0.2–1.1)
BUN SERPL-MCNC: 11 MG/DL (ref 8–23)
CALCIUM SERPL-MCNC: 9.3 MG/DL (ref 8.3–10.4)
CHLORIDE SERPL-SCNC: 111 MMOL/L (ref 98–107)
CO2 SERPL-SCNC: 27 MMOL/L (ref 21–32)
CREAT SERPL-MCNC: 1.02 MG/DL (ref 0.8–1.5)
DIFFERENTIAL METHOD BLD: ABNORMAL
EOSINOPHIL # BLD: 0.2 K/UL (ref 0–0.8)
EOSINOPHIL NFR BLD: 1 % (ref 0.5–7.8)
ERYTHROCYTE [DISTWIDTH] IN BLOOD BY AUTOMATED COUNT: 16.6 % (ref 11.9–14.6)
GLOBULIN SER CALC-MCNC: 4.4 G/DL (ref 2.3–3.5)
GLUCOSE SERPL-MCNC: 95 MG/DL (ref 65–100)
HCT VFR BLD AUTO: 45 % (ref 41.1–50.3)
HGB BLD-MCNC: 14.5 G/DL (ref 13.6–17.2)
IMM GRANULOCYTES # BLD AUTO: 0 K/UL (ref 0–0.5)
IMM GRANULOCYTES NFR BLD AUTO: 0 % (ref 0–5)
LYMPHOCYTES # BLD: 1.8 K/UL (ref 0.5–4.6)
LYMPHOCYTES NFR BLD: 16 % (ref 13–44)
MCH RBC QN AUTO: 30.3 PG (ref 26.1–32.9)
MCHC RBC AUTO-ENTMCNC: 32.2 G/DL (ref 31.4–35)
MCV RBC AUTO: 93.9 FL (ref 79.6–97.8)
MONOCYTES # BLD: 0.6 K/UL (ref 0.1–1.3)
MONOCYTES NFR BLD: 6 % (ref 4–12)
NEUTS SEG # BLD: 8.8 K/UL (ref 1.7–8.2)
NEUTS SEG NFR BLD: 77 % (ref 43–78)
NRBC # BLD: 0 K/UL (ref 0–0.2)
PLATELET # BLD AUTO: 222 K/UL (ref 150–450)
PMV BLD AUTO: 10.2 FL (ref 9.4–12.3)
POTASSIUM SERPL-SCNC: 3.4 MMOL/L (ref 3.5–5.1)
PROT SERPL-MCNC: 8.3 G/DL (ref 6.3–8.2)
RBC # BLD AUTO: 4.79 M/UL (ref 4.23–5.6)
SODIUM SERPL-SCNC: 143 MMOL/L (ref 136–145)
WBC # BLD AUTO: 11.5 K/UL (ref 4.3–11.1)

## 2021-07-06 PROCEDURE — 74011250636 HC RX REV CODE- 250/636: Performed by: EMERGENCY MEDICINE

## 2021-07-06 PROCEDURE — 71046 X-RAY EXAM CHEST 2 VIEWS: CPT

## 2021-07-06 PROCEDURE — 99284 EMERGENCY DEPT VISIT MOD MDM: CPT

## 2021-07-06 PROCEDURE — 93005 ELECTROCARDIOGRAM TRACING: CPT | Performed by: EMERGENCY MEDICINE

## 2021-07-06 PROCEDURE — 96374 THER/PROPH/DIAG INJ IV PUSH: CPT

## 2021-07-06 PROCEDURE — 85025 COMPLETE CBC W/AUTO DIFF WBC: CPT

## 2021-07-06 PROCEDURE — 80053 COMPREHEN METABOLIC PANEL: CPT

## 2021-07-06 RX ORDER — ONDANSETRON 2 MG/ML
4 INJECTION INTRAMUSCULAR; INTRAVENOUS
Status: DISCONTINUED | OUTPATIENT
Start: 2021-07-06 | End: 2021-07-07 | Stop reason: HOSPADM

## 2021-07-06 RX ORDER — ONDANSETRON 4 MG/1
4 TABLET, ORALLY DISINTEGRATING ORAL
Qty: 8 TABLET | Refills: 0 | Status: SHIPPED | OUTPATIENT
Start: 2021-07-06 | End: 2021-07-26

## 2021-07-06 RX ADMIN — GLUCAGON HYDROCHLORIDE 1 MG: KIT at 21:36

## 2021-07-07 ENCOUNTER — ANESTHESIA (OUTPATIENT)
Dept: ENDOSCOPY | Age: 66
End: 2021-07-07
Payer: MEDICARE

## 2021-07-07 ENCOUNTER — HOSPITAL ENCOUNTER (OUTPATIENT)
Age: 66
Setting detail: OUTPATIENT SURGERY
Discharge: HOME | End: 2021-07-07
Attending: INTERNAL MEDICINE | Admitting: INTERNAL MEDICINE
Payer: MEDICARE

## 2021-07-07 ENCOUNTER — ANESTHESIA EVENT (OUTPATIENT)
Dept: ENDOSCOPY | Age: 66
End: 2021-07-07
Payer: MEDICARE

## 2021-07-07 VITALS
BODY MASS INDEX: 37.89 KG/M2 | WEIGHT: 250 LBS | HEART RATE: 89 BPM | SYSTOLIC BLOOD PRESSURE: 165 MMHG | TEMPERATURE: 97.4 F | DIASTOLIC BLOOD PRESSURE: 76 MMHG | HEIGHT: 68 IN | RESPIRATION RATE: 13 BRPM | OXYGEN SATURATION: 95 %

## 2021-07-07 LAB
ATRIAL RATE: 214 BPM
CALCULATED P AXIS, ECG09: 48 DEGREES
CALCULATED R AXIS, ECG10: 82 DEGREES
CALCULATED T AXIS, ECG11: -7 DEGREES
DIAGNOSIS, 93000: NORMAL
Q-T INTERVAL, ECG07: 378 MS
QRS DURATION, ECG06: 96 MS
QTC CALCULATION (BEZET), ECG08: 475 MS
VENTRICULAR RATE, ECG03: 95 BPM

## 2021-07-07 PROCEDURE — 74011250637 HC RX REV CODE- 250/637: Performed by: ANESTHESIOLOGY

## 2021-07-07 PROCEDURE — 77030037227 HC FCPS ENDO GRSP RESCU DISP BSC -B: Performed by: INTERNAL MEDICINE

## 2021-07-07 PROCEDURE — 77030020032: Performed by: INTERNAL MEDICINE

## 2021-07-07 PROCEDURE — 2709999900 HC NON-CHARGEABLE SUPPLY: Performed by: INTERNAL MEDICINE

## 2021-07-07 PROCEDURE — 74011000250 HC RX REV CODE- 250: Performed by: NURSE ANESTHETIST, CERTIFIED REGISTERED

## 2021-07-07 PROCEDURE — 77030021593 HC FCPS BIOP ENDOSC BSC -A: Performed by: INTERNAL MEDICINE

## 2021-07-07 PROCEDURE — 77030007289 HC DEV ROTH NET RETRV STRC -B: Performed by: INTERNAL MEDICINE

## 2021-07-07 PROCEDURE — 74011250636 HC RX REV CODE- 250/636: Performed by: NURSE ANESTHETIST, CERTIFIED REGISTERED

## 2021-07-07 PROCEDURE — 76060000034 HC ANESTHESIA 1.5 TO 2 HR: Performed by: INTERNAL MEDICINE

## 2021-07-07 PROCEDURE — 76040000028: Performed by: INTERNAL MEDICINE

## 2021-07-07 PROCEDURE — 74011250636 HC RX REV CODE- 250/636: Performed by: ANESTHESIOLOGY

## 2021-07-07 RX ORDER — SODIUM CHLORIDE, SODIUM LACTATE, POTASSIUM CHLORIDE, CALCIUM CHLORIDE 600; 310; 30; 20 MG/100ML; MG/100ML; MG/100ML; MG/100ML
50 INJECTION, SOLUTION INTRAVENOUS CONTINUOUS
Status: DISCONTINUED | OUTPATIENT
Start: 2021-07-07 | End: 2021-07-08 | Stop reason: HOSPADM

## 2021-07-07 RX ORDER — OXYCODONE HYDROCHLORIDE 5 MG/1
5 TABLET ORAL
Status: COMPLETED | OUTPATIENT
Start: 2021-07-07 | End: 2021-07-07

## 2021-07-07 RX ORDER — LIDOCAINE HYDROCHLORIDE 20 MG/ML
INJECTION, SOLUTION EPIDURAL; INFILTRATION; INTRACAUDAL; PERINEURAL AS NEEDED
Status: DISCONTINUED | OUTPATIENT
Start: 2021-07-07 | End: 2021-07-07 | Stop reason: HOSPADM

## 2021-07-07 RX ORDER — ROCURONIUM BROMIDE 10 MG/ML
INJECTION, SOLUTION INTRAVENOUS AS NEEDED
Status: DISCONTINUED | OUTPATIENT
Start: 2021-07-07 | End: 2021-07-07 | Stop reason: HOSPADM

## 2021-07-07 RX ORDER — SODIUM CHLORIDE, SODIUM LACTATE, POTASSIUM CHLORIDE, CALCIUM CHLORIDE 600; 310; 30; 20 MG/100ML; MG/100ML; MG/100ML; MG/100ML
100 INJECTION, SOLUTION INTRAVENOUS CONTINUOUS
Status: DISCONTINUED | OUTPATIENT
Start: 2021-07-07 | End: 2021-07-08 | Stop reason: HOSPADM

## 2021-07-07 RX ORDER — PROPOFOL 10 MG/ML
INJECTION, EMULSION INTRAVENOUS AS NEEDED
Status: DISCONTINUED | OUTPATIENT
Start: 2021-07-07 | End: 2021-07-07 | Stop reason: HOSPADM

## 2021-07-07 RX ORDER — ALBUTEROL SULFATE 0.83 MG/ML
2.5 SOLUTION RESPIRATORY (INHALATION) AS NEEDED
Status: DISCONTINUED | OUTPATIENT
Start: 2021-07-07 | End: 2021-07-08 | Stop reason: HOSPADM

## 2021-07-07 RX ORDER — SUCCINYLCHOLINE CHLORIDE 20 MG/ML
INJECTION INTRAMUSCULAR; INTRAVENOUS AS NEEDED
Status: DISCONTINUED | OUTPATIENT
Start: 2021-07-07 | End: 2021-07-07 | Stop reason: HOSPADM

## 2021-07-07 RX ORDER — HYDROMORPHONE HYDROCHLORIDE 1 MG/ML
0.5 INJECTION, SOLUTION INTRAMUSCULAR; INTRAVENOUS; SUBCUTANEOUS
Status: DISCONTINUED | OUTPATIENT
Start: 2021-07-07 | End: 2021-07-08 | Stop reason: HOSPADM

## 2021-07-07 RX ADMIN — PROPOFOL 150 MG: 10 INJECTION, EMULSION INTRAVENOUS at 13:24

## 2021-07-07 RX ADMIN — OXYCODONE HYDROCHLORIDE 5 MG: 5 TABLET ORAL at 16:31

## 2021-07-07 RX ADMIN — LIDOCAINE HYDROCHLORIDE 80 MG: 20 INJECTION, SOLUTION EPIDURAL; INFILTRATION; INTRACAUDAL; PERINEURAL at 13:24

## 2021-07-07 RX ADMIN — SUCCINYLCHOLINE CHLORIDE 180 MG: 20 INJECTION, SOLUTION INTRAMUSCULAR; INTRAVENOUS at 13:25

## 2021-07-07 RX ADMIN — ROCURONIUM BROMIDE 5 MG: 10 INJECTION, SOLUTION INTRAVENOUS at 13:24

## 2021-07-07 RX ADMIN — SODIUM CHLORIDE, SODIUM LACTATE, POTASSIUM CHLORIDE, AND CALCIUM CHLORIDE 100 ML/HR: 600; 310; 30; 20 INJECTION, SOLUTION INTRAVENOUS at 12:41

## 2021-07-07 NOTE — DISCHARGE INSTRUCTIONS
You will be contacted in the morning between 7 AM and 730 with specific instructions as to where to present for your EGD. Return if symptoms worsen or progress in any way.

## 2021-07-07 NOTE — PROGRESS NOTES
Per Dr. Frank Capellan, when office called pt to schedule an outpatient procedure for EGD/dilation pt informed office that he is not \"doing better\" and has been drinking lemonade and still having problems drinking. Dr. Frank Capellan stated will do EGD today. Office to call pt to update on arrival time of 1100 and need to be NPO for scheduled procedure at 1300.

## 2021-07-07 NOTE — ED PROVIDER NOTES
71-year-old male with history of GERD, PUD, history of esophageal stricture with previous history of esophageal food bolus presents with concern for food bolus since Friday. States that he was eating chicken on Friday night and ever since then he has been unable to keep anything down. Patient states that he previously to get his esophagus dilated in the past but states that he can no longer have this done because of \"large callus surrounding his distal esophagus\". Denies chest pain, shortness of breath, fever, chills, hematemesis, cough, dizziness, weakness, abdominal pain, diarrhea, constipation. Patient denies nausea. Denies voice change or pain with swallowing. The history is provided by the patient. No  was used. Foreign Body Swallowed  The current episode started more than 2 days ago. The foreign body is suspected to be swallowed. The foreign body is food. Associated symptoms include vomiting. Pertinent negatives include no fever, no congestion, no drainage, no nosebleeds, no drooling, no sore throat, no trouble swallowing, no difficulty breathing, no cough, no wheezing, no chest pain and no abdominal pain. Past Medical History:   Diagnosis Date    Adverse effect of anesthesia     wife states had delayed awaken    Anxiety     takes alprazolam daily    Arthritis     Edema     BLE; takes spironolactone daily    GERD (gastroesophageal reflux disease)     managed w/med    Inguinal hernia     left per pt    PUD (peptic ulcer disease) 40 yrs ago    pt states no current issues    Severe obesity (BMI 35.0-39. 9) with comorbidity ([address] 4/24/2018    Status post left hip replacement 10/14/2015    Stroke [facility]) 2002    pt states still limps w/right leg, has problems with speech & problems writing at times       Past Surgical History:   Procedure Laterality Date    HX ADENOIDECTOMY      HX APPENDECTOMY      HX CAROTID ENDARTERECTOMY Bilateral     HX ENDOSCOPY      multiple with dilation    HX GI      esophageal dilation    HX HERNIA REPAIR      HX HIP REPLACEMENT      HX ORTHOPAEDIC Right     knee    HX ORTHOPAEDIC Left     thumb; bone removed    HX ORTHOPAEDIC Right     middle finger sx    HX ORTHOPAEDIC  2018    lumbar surgery, bilateral laminectomy    HX TONSILLECTOMY      HX UROLOGICAL      testicular sx due to injury as a child         Family History:   Problem Relation Age of Onset    Arthritis-osteo Mother     Diabetes Mother     Diabetes Father     Arthritis-osteo Father     Cancer Maternal Grandmother     Breast Cancer Neg Hx        Social History     Socioeconomic History    Marital status:      Spouse name: Not on file    Number of children: Not on file    Years of education: Not on file    Highest education level: Not on file   Occupational History    Not on file   Tobacco Use    Smoking status: Former Smoker     Packs/day: 1.50     Years: 20.00     Pack years: 30.00     Quit date: 2002     Years since quittin.1    Smokeless tobacco: Never Used    Tobacco comment: quit smoking in    Substance and Sexual Activity    Alcohol use: No     Comment: pt was drinking wine in the evening but stopped last week due to surgery    Drug use: No    Sexual activity: Not on file   Other Topics Concern    Not on file   Social History Narrative    Not on file     Social Determinants of Health     Financial Resource Strain:     Difficulty of Paying Living Expenses:    Food Insecurity:     Worried About Running Out of Food in the Last Year:     [place] of Food in the Last Year:    Transportation Needs:     Lack of Transportation (Medical):      Lack of Transportation (Non-Medical):    Physical Activity:     Days of Exercise per Week:     Minutes of Exercise per Session:    Stress:     Feeling of Stress :    Social Connections:     Frequency of Communication with Friends and Family:     Frequency of Social Gatherings with Friends and Family:     Attends [employer] Services:     Active Member of Clubs or Organizations:     Attends Club or Organization Meetings:     Marital Status:    Intimate Partner Violence:     Fear of Current or Ex-Partner:     Emotionally Abused:     Physically Abused:     Sexually Abused: ALLERGIES: Beet    Review of Systems   Constitutional: Negative for chills, fatigue and fever. HENT: Negative for congestion, drooling, nosebleeds, sore throat, trouble swallowing and voice change. Respiratory: Negative for cough, chest tightness, shortness of breath, wheezing and stridor. Cardiovascular: Negative for chest pain. Gastrointestinal: Positive for vomiting. Negative for abdominal pain, blood in stool, diarrhea and nausea. Genitourinary: Negative for dysuria and flank pain. Musculoskeletal: Negative for gait problem and myalgias. Skin: Negative for color change and rash. Neurological: Negative for dizziness, syncope, light-headedness and headaches. Psychiatric/Behavioral: Negative for confusion. Vitals:    07/06/21 1933   BP: (!) 174/94   Pulse: (!) 107   Resp: 18   Temp: 98.6 °F (37 °C)   SpO2: 98%   Weight: 117.9 kg (260 lb)   Height: 5' 8\" (1.727 m)            Physical Exam  Vitals and nursing note reviewed. HENT:      Head: Normocephalic. Nose: Nose normal.      Mouth/Throat:      Mouth: Mucous membranes are moist.      Comments: Uvula midline. No posterior oropharynx edema noted. Patient tolerating secretions. No trismus or stridor. No muffled voice. Eyes:      Extraocular Movements: Extraocular movements intact. Pupils: Pupils are equal, round, and reactive to light. Neck:      Comments: FROM. Cardiovascular:      Rate and Rhythm: Normal rate. Pulses: Normal pulses. Heart sounds: Normal heart sounds. Pulmonary:      Effort: Pulmonary effort is normal.      Breath sounds: Normal breath sounds. Comments: CTAB.   No wheezes or stridor noted.  Abdominal:      General: Bowel sounds are normal.      Palpations: Abdomen is soft. Tenderness: There is no abdominal tenderness. There is no guarding or rebound. Comments: Soft, non-tender, non-distended. No rebound or guarding. Musculoskeletal:         General: Normal range of motion. Cervical back: Normal range of motion. Skin:     General: Skin is warm. Findings: No erythema. Neurological:      General: No focal deficit present. Mental Status: He is alert and oriented to person, place, and time. Motor: No weakness. MDM  Number of Diagnoses or Management Options  Esophageal obstruction due to food impaction: new and requires workup  Diagnosis management comments: Initial vital signs stable. Chest x-ray with no acute or concerning findings noted. Patient tolerating secretions. Labs relatively unremarkable. Patient given glucagon 1 mg IV. Orders placed for Zofran 4 mg IV, 1 L normal saline IV fluid bolus. Patient states that he still feels like the food is stuck in his throat. Gastroenterology consulted. Spoke with Dr. Anabella Jones who states that patient is in agreement the best option would be for patient to be discharged home and that he will be contacted first thing in morning to schedule EGD. States that since since patient has been having difficulty since Friday would likely be a difficult procedure. Patient in no acute distress and states that he would like to go home and have procedure in the morning. Patient in no acute distress. Amount and/or Complexity of Data Reviewed  Clinical lab tests: ordered and reviewed  Tests in the radiology section of CPT®: ordered and reviewed  Tests in the medicine section of CPT®: ordered and reviewed  Review and summarize past medical records: yes  Independent visualization of images, tracings, or specimens: yes      ED Course as of Jul 07 0014   Tue Jul 06, 2021   9215 CXR FINDINGS: The lungs are clear.  The cardiac size, mediastinal contour and  pulmonary vasculature are normal. No pneumothorax, pneumomediastinum or pleural  effusion is seen. There is no radiopaque foreign body or obvious esophageal  distention. No free intraperitoneal air is visualized. The bony structures are  intact.     IMPRESSION:   No acute process. [DF]      ED Course User Index  [DF] Maximiliano Aviles MD       EKG    Date/Time: 7/7/2021 12:20 AM  Performed by: Maximiliano Aviles MD  Authorized by: Maximiliano Aviles MD     ECG reviewed by ED Physician in the absence of a cardiologist: yes    Rate:     ECG rate:  95    ECG rate assessment: normal    Rhythm:     Rhythm: sinus rhythm    QRS:     QRS axis:  Normal    QRS intervals:  Normal  Conduction:     Conduction: normal    ST segments:     ST segments:  Normal  T waves:     T waves: normal          Results Include:    Recent Results (from the past 24 hour(s))   CBC WITH AUTOMATED DIFF    Collection Time: 07/06/21  7:36 PM   Result Value Ref Range    WBC 11.5 (H) 4.3 - 11.1 K/uL    RBC 4.79 4.23 - 5.6 M/uL    HGB 14.5 13.6 - 17.2 g/dL    HCT 45.0 41.1 - 50.3 %    MCV 93.9 79.6 - 97.8 FL    MCH 30.3 26.1 - 32.9 PG    MCHC 32.2 31.4 - 35.0 g/dL    RDW 16.6 (H) 11.9 - 14.6 %    PLATELET 081 518 - 002 K/uL    MPV 10.2 9.4 - 12.3 FL    ABSOLUTE NRBC 0.00 0.0 - 0.2 K/uL    DF AUTOMATED      NEUTROPHILS 77 43 - 78 %    LYMPHOCYTES 16 13 - 44 %    MONOCYTES 6 4.0 - 12.0 %    EOSINOPHILS 1 0.5 - 7.8 %    BASOPHILS 1 0.0 - 2.0 %    IMMATURE GRANULOCYTES 0 0.0 - 5.0 %    ABS. NEUTROPHILS 8.8 (H) 1.7 - 8.2 K/UL    ABS. LYMPHOCYTES 1.8 0.5 - 4.6 K/UL    ABS. MONOCYTES 0.6 0.1 - 1.3 K/UL    ABS. EOSINOPHILS 0.2 0.0 - 0.8 K/UL    ABS. BASOPHILS 0.1 0.0 - 0.2 K/UL    ABS. IMM.  GRANS. 0.0 0.0 - 0.5 K/UL   METABOLIC PANEL, COMPREHENSIVE    Collection Time: 07/06/21  7:36 PM   Result Value Ref Range    Sodium 143 136 - 145 mmol/L    Potassium 3.4 (L) 3.5 - 5.1 mmol/L    Chloride 111 (H) 98 - 107 mmol/L    CO2 27 21 - 32 mmol/L    Anion gap 5 (L) 7 - 16 mmol/L    Glucose 95 65 - 100 mg/dL    BUN 11 8 - 23 MG/DL    Creatinine 1.02 0.8 - 1.5 MG/DL    GFR est AA >60 >60 ml/min/1.73m2    GFR est non-AA >60 >60 ml/min/1.73m2    Calcium 9.3 8.3 - 10.4 MG/DL    Bilirubin, total 0.6 0.2 - 1.1 MG/DL    ALT (SGPT) 62 12 - 65 U/L    AST (SGOT) 81 (H) 15 - 37 U/L    Alk. phosphatase 53 50 - 136 U/L    Protein, total 8.3 (H) 6.3 - 8.2 g/dL    Albumin 3.9 3.2 - 4.6 g/dL    Globulin 4.4 (H) 2.3 - 3.5 g/dL    A-G Ratio 0.9 (L) 1.2 - 3.5     EKG, 12 LEAD, INITIAL    Collection Time: 07/06/21  9:44 PM   Result Value Ref Range    Ventricular Rate 95 BPM    Atrial Rate 214 BPM    QRS Duration 96 ms    Q-T Interval 378 ms    QTC Calculation (Bezet) 475 ms    Calculated P Axis 48 degrees    Calculated R Axis 82 degrees    Calculated T Axis -7 degrees    Diagnosis       !! AGE AND GENDER SPECIFIC ECG ANALYSIS !! Atrial flutter with variable A-V block with premature ventricular or   aberrantly conducted complexes  Abnormal QRS-T angle, consider primary T wave abnormality  Abnormal ECG  When compared with ECG of 28-SEP-2017 09:45,  Atrial flutter has replaced Sinus rhythm  Inverted T waves have replaced nonspecific T wave abnormality in Inferior   leads             Rolland Najjar., MD; 7/6/2021 @9:33 PM Voice dictation software was used during the making of this note. This software is not perfect and grammatical and other typographical errors may be present.   This note has not been proofread for errors.  ===================================================================

## 2021-07-07 NOTE — ANESTHESIA PREPROCEDURE EVALUATION
Anesthetic History   No history of anesthetic complications            Review of Systems / Medical History  Patient summary reviewed and pertinent labs reviewed    Pulmonary    COPD: mild               Neuro/Psych       CVA (slightly weak right arm and slurred speech)       Cardiovascular    Hypertension: well controlled          PAD (s/p CEA)    Exercise tolerance: <4 METS: Limited by back pain and leg pain  Comments: TTE 2014- EF 55-60%, no sign valve abnl   GI/Hepatic/Renal     GERD: well controlled           Endo/Other        Obesity and arthritis     Other Findings              Physical Exam    Airway  Mallampati: III  TM Distance: < 4 cm  Neck ROM: short neck   Mouth opening: Diminished (comment)    Comments: Full beard Cardiovascular    Rhythm: regular  Rate: normal         Dental    Dentition: Full lower dentures and Full upper dentures     Pulmonary  Breath sounds clear to auscultation               Abdominal         Other Findings            Anesthetic Plan    ASA: 3, emergent  Anesthesia type: general          Induction: RSI  Anesthetic plan and risks discussed with: Patient

## 2021-07-07 NOTE — PROCEDURES
Esophagogastroduodenoscopy    DATE of PROCEDURE: 7/7/2021    MEDICATIONS ADMINISTERED: MAC    INSTRUMENT: GIF    PROCEDURE:  After obtaining informed consent, the patient was placed in the left lateral position and sedated. The endoscope was advanced under direct vision without difficulty. The esophagus, stomach (including retroflexed views) and duodenum were evaluated. The patient was taken to the recovery area in stable condition. FINDINGS:  ESOPHAGUS: Large amount of food noted in the distal esophagus. It took an hour and a half and multiple tools (forceps, rat tooth forceps, miner net, tripod) to remove the food bolus. Eventually a blast of water after making a tunnel at the edge of the food bolus led it to pass into the stomach. The GE junction at 42cm is edematous and ulcerated slightly. No mass lesion lesion. Given proximal dilation of the esophagus there is a question of achalasia but hard to assess today. STOMACH: Normal  DUODENUM: Normal    Estimated blood loss: 0-minimal     PLAN:  1. Liquid diet today  2. PPI BID and carafte  3.  Repeat EGD in 2-4 wks with myself, Dr Bro Boo, or any MD for dilation    Ramya Mahan MD  [facility] [place]

## 2021-07-07 NOTE — H&P
Gastroenterology Outpatient History and Physical    Patient: Ginger Willingham    Physician: Radha Rodriguez MD    Vital Signs: Blood pressure (!) 194/84, pulse (!) 102, temperature 98.3 °F (36.8 °C), resp. rate 18, height 5' 8\" (1.727 m), weight 113.4 kg (250 lb), SpO2 96 %. Allergies: Allergies   Allergen Reactions    Beet Rash       Chief Complaint: dysphagia    History of Present Illness: 72 yr old obese male here for dysphagia after food becoming lodged on Friday. He states it is still stuck in his esophagus though earlier he told a staff member it passed and he was sipping lemonade    Justification for Procedure: egd to evaluate for food impaction as well as GEJ stricture. Last EGD with us was in 2017    History:  Past Medical History:   Diagnosis Date    Adverse effect of anesthesia     wife states had delayed awaken    Anxiety     takes alprazolam daily    Arthritis     Edema     BLE; takes spironolactone daily    GERD (gastroesophageal reflux disease)     managed w/med    Inguinal hernia     left per pt    PUD (peptic ulcer disease) 40 yrs ago    pt states no current issues    Severe obesity (BMI 35.0-39. 9) with comorbidity ([address] 4/24/2018    Status post left hip replacement 10/14/2015    Stroke [facility]) 2002    pt states still limps w/right leg, has problems with speech & problems writing at times      Past Surgical History:   Procedure Laterality Date    HX ADENOIDECTOMY      HX APPENDECTOMY      HX CAROTID ENDARTERECTOMY Bilateral     HX ENDOSCOPY      multiple with dilation    HX GI      esophageal dilation    HX HERNIA REPAIR      HX HIP REPLACEMENT      HX ORTHOPAEDIC Right     knee    HX ORTHOPAEDIC Left     thumb; bone removed    HX ORTHOPAEDIC Right     middle finger sx    HX ORTHOPAEDIC  05/30/2018    lumbar surgery, bilateral laminectomy    HX TONSILLECTOMY      HX UROLOGICAL      testicular sx due to injury as a child      Social History     Socioeconomic History    Marital status:      Spouse name: Not on file    Number of children: Not on file    Years of education: Not on file    Highest education level: Not on file   Tobacco Use    Smoking status: Former Smoker     Packs/day: 1.50     Years: 20.00     Pack years: 30.00     Quit date: 2002     Years since quittin.1    Smokeless tobacco: Never Used    Tobacco comment: quit smoking in    Substance and Sexual Activity    Alcohol use: No     Comment: pt was drinking wine in the evening but stopped last week due to surgery    Drug use: No     Social Determinants of Health     Financial Resource Strain:     Difficulty of Paying Living Expenses:    Food Insecurity:     Worried About Running Out of Food in the Last Year:     [address] in the Last Year:    Transportation Needs:     Lack of Transportation (Medical):  Lack of Transportation (Non-Medical):    Physical Activity:     Days of Exercise per Week:     Minutes of Exercise per Session:    Stress:     Feeling of Stress :    Social Connections:     Frequency of Communication with Friends and Family:     Frequency of Social Gatherings with Friends and Family:     Attends Zoroastrian Services:     Active Member of Clubs or Organizations:     Attends Club or Organization Meetings:     Marital Status:       Family History   Problem Relation Age of Onset   [employer] Arthritis-osteo Mother     Diabetes Mother     Diabetes Father     Arthritis-osteo Father     Cancer Maternal Grandmother     Breast Cancer Neg Hx        Medications:   Prior to Admission medications    Medication Sig Start Date End Date Taking? Authorizing Provider   ondansetron (ZOFRAN ODT) 4 mg disintegrating tablet Take 1 Tablet by mouth every eight (8) hours as needed for Nausea.  21   Jeronimo Keating MD   ALPRAZolam Severo Trevorymes) 2 mg tablet 1/2 to 1 qid prn anxiety 18   Peter Del Angel MD   furosemide (LASIX) 40 mg tablet 1 qd for fluid 18 Nancy Horner MD   metoprolol succinate (TOPROL-XL) 200 mg XL tablet 1 every day for BP 12/5/18   Nancy Horner MD   valsartan-hydroCHLOROthiazide (DIOVAN-HCT) 320-12.5 mg per tablet 1 every day for BP 12/5/18   Nancy Horner MD   Omeprazole delayed release (PRILOSEC D/R) 20 mg tablet 1 bid for stomach acid 12/5/18   Nancy Horner MD   atorvastatin (LIPITOR) 80 mg tablet 1 every day for cholesterol 12/5/18   Nancy Horner MD   zinc oxide-cod liver oil (DESITIN) 40 % paste Apply 40 % to affected area as needed for Skin Irritation. 6/30/18   Provider, Historical   lubiPROStone (AMITIZA) 8 mcg capsule Take 8 mcg by mouth daily (with breakfast). Provider, Historical   sildenafil citrate (VIAGRA) 100 mg tablet 1/2 to 1 q 24h prn 6/20/18   Nancy Horner MD       Physical Exam:   General: no distress   HEENT: Head: Normocephalic, no lesions, without obvious abnormality.    Heart: regular rate and rhythm, S1, S2 normal, no murmur, click, rub or gallop   Lungs: chest clear, no wheezing, rales, normal symmetric air entry   Abdominal: Bowel sounds are normal, liver is not enlarged, spleen is not enlarged   Neurological: Grossly normal   Extremities: extremities normal, atraumatic, no cyanosis or edema     Findings/Diagnosis: dysphagia with hx fo stricture    Plan of Care/Planned Procedure: egd +/- removal of food +/- dilatin    Adalberto Altamirano MD  [facility]

## 2021-07-07 NOTE — ED NOTES
I have reviewed discharge instructions with the patient. The patient verbalized understanding. Patient left ED via Discharge Method: ambulatory to Home with spouse    Opportunity for questions and clarification provided. Patient given 1 scripts. To continue your aftercare when you leave the hospital, you may receive an automated call from our care team to check in on how you are doing. This is a free service and part of our promise to provide the best care and service to meet your aftercare needs.  If you have questions, or wish to unsubscribe from this service please call [phone]. Thank you for Choosing our [facility] Emergency Department.

## 2021-07-07 NOTE — DISCHARGE INSTRUCTIONS
PLAN:  1. Liquid diet today  2. PPI BID and carafte  3. Repeat EGD in 2-4 wks with myself, Dr Tim Lazaro, or any MD for dilation    Upper GI Endoscopy: What to Expect at [address] [place] [address]  After you have an endoscopy you will be able to go home after your doctor or nurse checks to make sure you are not having any problems. You may have to stay overnight if you had treatment during the test. You may have a sore throat for a day or two after the test.  This care sheet gives you a general idea about what to expect after the test.  How can you care for yourself at home? Activity  · Rest as much as you need to after you go home. · You should be able to go back to your usual activities the day after the test.  Diet  · Follow your doctor's directions for eating after the test.  · Drink plenty of fluids (unless your doctor has told you not to). Medications  · If you have a sore throat the day after the test, use an over-the-counter spray to numb your throat. Medication Interaction:  During your procedure you potentially received a medication or medications which may reduce the effectiveness of oral contraceptives. Please consider other forms of contraception for 1 month following your procedure if you are currently using oral contraceptives as your primary form of birth control. In addition to this, we recommend continuing your oral contraceptive as prescribed, unless otherwise instructed by your physician, during this time. Follow-up care is a key part of your treatment and safety. Be sure to make and go to all appointments, and call your doctor if you are having problems. It's also a good idea to know your test results and keep a list of the medicines you take. When should you call for help? Call 911 anytime you think you may need emergency care. For example, call if:  · You passed out (lost consciousness). · You cough up blood. · You vomit blood or what looks like coffee grounds.   · You pass maroon or very bloody stools. Call your doctor now or seek immediate medical care if:  · You have trouble swallowing. · You have belly pain. · Your stools are black and tarlike or have streaks of blood. · You are sick to your stomach or cannot keep fluids down. Watch closely for changes in your health, and be sure to contact your doctor if:  · Your throat still hurts after a day or two. You do not get better as expected. After general anesthesia or intravenous sedation, for 24 hours or while taking prescription Narcotics:  · Limit your activities  · A responsible adult needs to be with you for the next 24 hours  · Do not drive and operate hazardous machinery  · Do not make important personal or business decisions  · Do not drink alcoholic beverages  · If you have not urinated within 8 hours after discharge, and you are experiencing discomfort from urinary retention, please go to the nearest ED. · If you have sleep apnea and have a CPAP machine, please use it for all naps and sleeping. · Please use caution when taking narcotics and any of your home medications that may cause drowsiness. *  Please give a list of your current medications to your Primary Care Provider. *  Please update this list whenever your medications are discontinued, doses are      changed, or new medications (including over-the-counter products) are added. *  Please carry medication information at all times in case of emergency situations. These are general instructions for a healthy lifestyle:  No smoking/ No tobacco products/ Avoid exposure to second hand smoke  Surgeon General's Warning:  Quitting smoking now greatly reduces serious risk to your health.   Obesity, smoking, and sedentary lifestyle greatly increases your risk for illness  A healthy diet, regular physical exercise & weight monitoring are important for maintaining a healthy lifestyle    You may be retaining fluid if you have a history of heart failure or if you experience any of the following symptoms:  Weight gain of 3 pounds or more overnight or 5 pounds in a week, increased swelling in our hands or feet or shortness of breath while lying flat in bed. Please call your doctor as soon as you notice any of these symptoms; do not wait until your next office visit.

## 2021-07-07 NOTE — PROGRESS NOTES
Patient angry about the length of time he had to wait for his procedure. Attempted to explain that he was scheduled for 1300 today. Patient states that he wanted it done last night the he has not been able to eat or drink since Saturday and has missed his medications. Patient able to carry on conversation with out drooling or resp distress. Interview past aof pre assessment completed  Taken to prep area.

## 2021-07-08 NOTE — ANESTHESIA POSTPROCEDURE EVALUATION
Procedure(s):  ESOPHAGOGASTRODUODENOSCOPY (EGD)  ENDOSCOPIC FOREIGN BODY REMOVAL.     general    Anesthesia Post Evaluation      Multimodal analgesia: multimodal analgesia used between 6 hours prior to anesthesia start to PACU discharge  Patient location during evaluation: PACU  Patient participation: complete - patient participated  Level of consciousness: responsive to verbal stimuli  Pain management: adequate  Airway patency: patent  Anesthetic complications: no  Cardiovascular status: acceptable  Respiratory status: acceptable, spontaneous ventilation and nonlabored ventilation  Hydration status: acceptable  Post anesthesia nausea and vomiting:  none      INITIAL Post-op Vital signs:   Vitals Value Taken Time   /64 07/07/21 1559   Temp 36.3 °C (97.4 °F) 07/07/21 1539   Pulse 87 07/07/21 1559   Resp 15 07/07/21 1559   SpO2 95 % 07/07/21 1559

## 2021-07-21 ENCOUNTER — HOSPITAL ENCOUNTER (OUTPATIENT)
Dept: CT IMAGING | Age: 66
Discharge: HOME | End: 2021-07-21
Attending: FAMILY MEDICINE
Payer: MEDICARE

## 2021-07-21 VITALS — WEIGHT: 252 LBS | BODY MASS INDEX: 38.19 KG/M2 | HEIGHT: 68 IN

## 2021-07-21 DIAGNOSIS — Z87.891 PERSONAL HISTORY OF TOBACCO USE, PRESENTING HAZARDS TO HEALTH: ICD-10-CM

## 2021-07-21 PROCEDURE — 71271 CT THORAX LUNG CANCER SCR C-: CPT

## 2021-07-27 NOTE — PROGRESS NOTES
Confirmed arrival time with patient. No concerns noted at this time.  will be present. Patient has multiple questions about getting dilated so close back to back. Instructed to discuss with [facility] prior to procedure.

## 2021-07-28 ENCOUNTER — ANESTHESIA (OUTPATIENT)
Dept: ENDOSCOPY | Age: 66
End: 2021-07-28
Payer: MEDICARE

## 2021-07-28 ENCOUNTER — ANESTHESIA EVENT (OUTPATIENT)
Dept: ENDOSCOPY | Age: 66
End: 2021-07-28
Payer: MEDICARE

## 2021-07-28 ENCOUNTER — HOSPITAL ENCOUNTER (OUTPATIENT)
Age: 66
Setting detail: OUTPATIENT SURGERY
Discharge: HOME | End: 2021-07-28
Attending: INTERNAL MEDICINE | Admitting: INTERNAL MEDICINE
Payer: MEDICARE

## 2021-07-28 VITALS
TEMPERATURE: 97.1 F | SYSTOLIC BLOOD PRESSURE: 149 MMHG | OXYGEN SATURATION: 97 % | HEART RATE: 76 BPM | DIASTOLIC BLOOD PRESSURE: 76 MMHG | RESPIRATION RATE: 16 BRPM

## 2021-07-28 PROCEDURE — 76060000031 HC ANESTHESIA FIRST 0.5 HR: Performed by: INTERNAL MEDICINE

## 2021-07-28 PROCEDURE — 2709999900 HC NON-CHARGEABLE SUPPLY: Performed by: INTERNAL MEDICINE

## 2021-07-28 PROCEDURE — 74011250636 HC RX REV CODE- 250/636: Performed by: INTERNAL MEDICINE

## 2021-07-28 PROCEDURE — 74011000250 HC RX REV CODE- 250

## 2021-07-28 PROCEDURE — 76040000025: Performed by: INTERNAL MEDICINE

## 2021-07-28 PROCEDURE — 74011250636 HC RX REV CODE- 250/636

## 2021-07-28 RX ORDER — SODIUM CHLORIDE, SODIUM LACTATE, POTASSIUM CHLORIDE, CALCIUM CHLORIDE 600; 310; 30; 20 MG/100ML; MG/100ML; MG/100ML; MG/100ML
INJECTION, SOLUTION INTRAVENOUS
Status: DISCONTINUED | OUTPATIENT
Start: 2021-07-28 | End: 2021-07-28 | Stop reason: HOSPADM

## 2021-07-28 RX ORDER — GLYCOPYRROLATE 0.2 MG/ML
INJECTION INTRAMUSCULAR; INTRAVENOUS AS NEEDED
Status: DISCONTINUED | OUTPATIENT
Start: 2021-07-28 | End: 2021-07-28 | Stop reason: HOSPADM

## 2021-07-28 RX ORDER — PROPOFOL 10 MG/ML
INJECTION, EMULSION INTRAVENOUS AS NEEDED
Status: DISCONTINUED | OUTPATIENT
Start: 2021-07-28 | End: 2021-07-28 | Stop reason: HOSPADM

## 2021-07-28 RX ORDER — LIDOCAINE HYDROCHLORIDE 20 MG/ML
INJECTION, SOLUTION EPIDURAL; INFILTRATION; INTRACAUDAL; PERINEURAL AS NEEDED
Status: DISCONTINUED | OUTPATIENT
Start: 2021-07-28 | End: 2021-07-28 | Stop reason: HOSPADM

## 2021-07-28 RX ORDER — SODIUM CHLORIDE, SODIUM LACTATE, POTASSIUM CHLORIDE, CALCIUM CHLORIDE 600; 310; 30; 20 MG/100ML; MG/100ML; MG/100ML; MG/100ML
1000 INJECTION, SOLUTION INTRAVENOUS CONTINUOUS
Status: DISCONTINUED | OUTPATIENT
Start: 2021-07-28 | End: 2021-07-28 | Stop reason: HOSPADM

## 2021-07-28 RX ADMIN — SODIUM CHLORIDE, SODIUM LACTATE, POTASSIUM CHLORIDE, AND CALCIUM CHLORIDE: 600; 310; 30; 20 INJECTION, SOLUTION INTRAVENOUS at 09:21

## 2021-07-28 RX ADMIN — PROPOFOL 140 MCG/KG/MIN: 10 INJECTION, EMULSION INTRAVENOUS at 09:27

## 2021-07-28 RX ADMIN — SODIUM CHLORIDE, POTASSIUM CHLORIDE, SODIUM LACTATE AND CALCIUM CHLORIDE 1000 ML: 600; 310; 30; 20 INJECTION, SOLUTION INTRAVENOUS at 08:42

## 2021-07-28 RX ADMIN — GLYCOPYRROLATE 0.1 MG: 0.2 INJECTION INTRAMUSCULAR; INTRAVENOUS at 09:25

## 2021-07-28 RX ADMIN — LIDOCAINE HYDROCHLORIDE 80 MG: 20 INJECTION, SOLUTION EPIDURAL; INFILTRATION; INTRACAUDAL; PERINEURAL at 09:25

## 2021-07-28 RX ADMIN — PROPOFOL 30 MG: 10 INJECTION, EMULSION INTRAVENOUS at 09:28

## 2021-07-28 RX ADMIN — PROPOFOL 50 MG: 10 INJECTION, EMULSION INTRAVENOUS at 09:26

## 2021-07-28 NOTE — ANESTHESIA PREPROCEDURE EVALUATION
Anesthetic History   No history of anesthetic complications            Review of Systems / Medical History  Patient summary reviewed and pertinent labs reviewed    Pulmonary    COPD: mild               Neuro/Psych       CVA (slightly weak right arm and slurred speech)       Cardiovascular    Hypertension: well controlled          PAD (s/p CEA)    Exercise tolerance: <4 METS: Limited by back pain and leg pain  Comments: TTE 2014- EF 55-60%, no sign valve abnl   GI/Hepatic/Renal     GERD: well controlled           Endo/Other        Obesity and arthritis     Other Findings   Comments: Esophageal stricture - currently patent and managing secretions and small amounts of food well         Physical Exam    Airway  Mallampati: III  TM Distance: < 4 cm  Neck ROM: short neck   Mouth opening: Diminished (comment)    Comments: Full beard Cardiovascular    Rhythm: regular  Rate: normal         Dental    Dentition: Full lower dentures and Full upper dentures     Pulmonary  Breath sounds clear to auscultation               Abdominal         Other Findings            Anesthetic Plan    ASA: 3  Anesthesia type: total IV anesthesia            Anesthetic plan and risks discussed with: Patient

## 2021-07-28 NOTE — ANESTHESIA POSTPROCEDURE EVALUATION
Procedure(s):  ESOPHAGOGASTRODUODENOSCOPY (EGD)  ESOPHAGEAL DILATION. total IV anesthesia    Anesthesia Post Evaluation        Patient location during evaluation: bedside  Patient participation: complete - patient participated  Level of consciousness: responsive to verbal stimuli  Pain management: satisfactory to patient  Airway patency: patent  Anesthetic complications: no  Cardiovascular status: hemodynamically stable  Respiratory status: spontaneous ventilation  Hydration status: stable    Final Post Anesthesia Temperature Assessment:  Normothermia (36.0-37.5 degrees C)      INITIAL Post-op Vital signs:   Vitals Value Taken Time   /73 07/28/21 0947   Temp 36.2 °C (97.1 °F) 07/28/21 0936   Pulse 78 07/28/21 0950   Resp 15 07/28/21 0936   SpO2 98 % 07/28/21 0950   Vitals shown include unvalidated device data.

## 2021-07-28 NOTE — PROCEDURES
Esophagogastroduodenoscopy (EGD) Procedure Note    Procedure: EGD    Pre-operative Diagnosis: Food bolus occlusion 7/7/21 due to stricture of esophagus     Post-operative Diagnosis: Normal endoscopy  Some small amount of food in the stomach  Empiric dilation with 56 French benz-no trauma     Recommendations:  Have instructed the patient to chew his food more carefully, eat slowly and if he has persistent dysphagia we will consider manometry. Follow up:   As needed     Anesthesia/Sedation:  MAC, (see separate report). Procedure Details:  Informed consent was obtained for the procedure, including sedation. Risks of perforation, hemorrhage, adverse drug reaction and aspiration were discussed. The patient was placed in the left lateral decubitus position. Based on the pre-procedure assessment, including review of the patient's medical history, medications, allergies, and review of systems, he had been deemed to be an appropriate candidate for anesthesia. The patient was monitored continuously with ECG tracing, pulse oximetry, blood pressure monitoring, and direct observations. Please refer to the anesthesia record for details and dosages of medications. The esophagus was intubated with direct visualization. The esophagus, stomach and duodenum were traversed to the full extent of the endoscope. Retroflexed views of the cardia and fundus were performed. The stomach was fully insufflated and deflated. Findings:   OROPHARYNX: The oropharynx was briefly viewed on entry and withdrawal with very brief evaluation of the cords, arytenoids and pyriform sinuses. No abnormalities found. ESOPHAGUS:  The esophagus was normal with an intact squamocolumnar z-line without erosions or evidence of Orlando's intestinal metaplasia. The mucosa was normal.  There were no strictures, rings or noticeable narrowing of the lumen.   The endoscope was easily passed into the gastric pouch and there was no apparent hiatal hernia. Empiric dilation was performed without trauma     STOMACH: The gastric pouch inflated and deflated normally. The mucosal surface and gastric rugae were normal without erosions, ulcerations, raised lesions, vascular ectasias or other anomalies. The pylorus was patent to passage of the endoscope without difficulty. There is a small amount of food in the stomach     DUODENUM:  The endoscope was easily passed into the third section of the duodenum. The villous mucosa was normal without blunting or scalloped folds. There were no mucosal erosions, ulcerations, raised lesions or vascular ectasias. EBL: 0 cc's. Pathology speciment:  None. Complications: No apparent complications and the patient tolerated sedation and the procedure well. Attending Attestation: I performed the procedure.     Tahir Moreira MD

## 2021-07-28 NOTE — ROUTINE PROCESS
VSS. Patient denies any complaints at this time. Discharge education provided to patient and wife. Patient discharged out via wheelchair with RN.

## 2021-07-28 NOTE — DISCHARGE INSTRUCTIONS
Gastrointestinal Esophagogastroduodenoscopy (EGD) - Upper Exam Discharge Instructions    1. Call Dr. Pina Jay office for any problems or questions. 2. Contact the doctor's office for follow up appointment as directed. 3. Medication may cause drowsiness for several hours, therefore:  · Do not drive or operate machinery for remainder of the day. · No alcohol today. · Do not make any important or legal decisions for 24 hours. · Do not sign any legal documents for 24 hours. 5. Ordinarily, you may resume regular diet and activity after exam unless otherwise              specified by your physician. 6. For mild soreness in your throat you may use Cepacol throat lozenges or warm               salt-water gargles as needed. Any additional instructions:    1. Soft foods and progress as tolerated. 2. Small bites. 3. Chew food well.

## 2021-07-28 NOTE — H&P
Chief complaint/HPI and PMH:  Had a food impaction 7/7/21. Dilation back in 2014. Today's exam:  LUNGS:  Clear and equal.  COR:  RRR without murmurs heard. NEURO:  A and Oriented fully. I have thoroughly explained the preparation, procedure and sedation process to the patient as well as the risks and alternatives. They will sign informed consent prior to the procedure.         Cameron Roach MD

## 2021-08-16 ENCOUNTER — HOSPITAL ENCOUNTER (OUTPATIENT)
Dept: GENERAL RADIOLOGY | Age: 66
Discharge: HOME | End: 2021-08-16
Attending: INTERNAL MEDICINE
Payer: MEDICARE

## 2021-08-16 ENCOUNTER — TRANSCRIBE ORDER (OUTPATIENT)
Dept: SCHEDULING | Age: 66
End: 2021-08-16

## 2021-08-16 DIAGNOSIS — Z12.11 SPECIAL SCREENING FOR MALIGNANT NEOPLASMS, COLON: ICD-10-CM

## 2021-08-16 DIAGNOSIS — K22.2 STRICTURE OF ESOPHAGUS: ICD-10-CM

## 2021-08-16 DIAGNOSIS — K22.2 STRICTURE OF ESOPHAGUS: Primary | ICD-10-CM

## 2021-08-16 PROCEDURE — 74220 X-RAY XM ESOPHAGUS 1CNTRST: CPT

## 2021-08-16 PROCEDURE — 74011000250 HC RX REV CODE- 250: Performed by: INTERNAL MEDICINE

## 2021-08-16 RX ADMIN — BARIUM SULFATE 135 ML: 980 POWDER, FOR SUSPENSION ORAL at 13:29

## 2021-08-16 RX ADMIN — BARIUM SULFATE 700 MG: 700 TABLET ORAL at 13:29

## 2021-08-16 RX ADMIN — BARIUM SULFATE 150 ML: 0.6 SUSPENSION ORAL at 13:29

## 2021-08-16 RX ADMIN — ANTACID/ANTIFLATULENT 4 G: 380; 550; 10; 10 GRANULE, EFFERVESCENT ORAL at 13:29

## 2021-10-11 ENCOUNTER — HOSPITAL ENCOUNTER (OUTPATIENT)
Age: 66
Setting detail: OUTPATIENT SURGERY
Discharge: HOME | End: 2021-10-11
Attending: INTERNAL MEDICINE | Admitting: INTERNAL MEDICINE
Payer: MEDICARE

## 2021-10-11 PROCEDURE — 2709999900 HC NON-CHARGEABLE SUPPLY: Performed by: INTERNAL MEDICINE

## 2021-10-11 PROCEDURE — 74011000250 HC RX REV CODE- 250: Performed by: INTERNAL MEDICINE

## 2021-10-11 PROCEDURE — 91010 ESOPHAGUS MOTILITY STUDY: CPT | Performed by: INTERNAL MEDICINE

## 2021-10-11 RX ORDER — LIDOCAINE HYDROCHLORIDE 20 MG/ML
15 SOLUTION OROPHARYNGEAL AS NEEDED
Status: DISCONTINUED | OUTPATIENT
Start: 2021-10-11 | End: 2021-10-11 | Stop reason: HOSPADM

## 2021-10-11 RX ADMIN — LIDOCAINE HYDROCHLORIDE 15 ML: 20 SOLUTION ORAL; TOPICAL at 10:15

## 2021-10-13 NOTE — PROCEDURES
[facility]  PROCEDURE NOTE    Name:  Chato Mujica  MR#:  [id number]  :  1955  ACCOUNT #:  [de-identified]  DATE OF SERVICE:  10/11/2021    PROCEDURE PERFORMED:  High-resolution impedance manometry. PREOPERATIVE DIAGNOSIS:  Dysphagia. POSTOPERATIVE DIAGNOSES:  1. Hypertensive lower esophageal sphincter - 63 mmHg with inadequate relaxation - IRP equals 41.  2.  Grossly abnormal motility with 90% ineffective, DCI averaging 324.  3.  Impedance data with only 20% of liquid and 10% of viscous swallows with transit. PROCEDURE:  After obtaining informed consent, the patient underwent nasal intubation. The lower esophageal sphincter was located between 46 and 58 cm from the nares. The resting pressure averaged 63 mmHg at the lower esophageal sphincter mid respiration. Relaxation was incomplete with IRP averaging 41, normal is less than 20. This indicates an outlet obstruction. This coupled with mostly abnormal, (only two normal peristaltic contractions and the remainder were failed), does not constitute achalasia but is certainly progressing in that direction. The impedance data of only 20% liquid and 10% viscous swallows showing a transit also suggests that this is a real and significant finding of outlet obstruction and generalized dysfunction of the body of the esophagus, perhaps progressing towards achalasia. Further followup per Dr. Farideh Serrano.       Allen Galan MD      [id number]  D:  10/13/2021 16:06  T:  10/13/2021 17:43  JOB #:  [id number]  CC:  Rusaln Horta MD

## 2022-03-18 PROBLEM — I87.2 VENOUS STASIS DERMATITIS OF BOTH LOWER EXTREMITIES: Status: ACTIVE | Noted: 2018-12-27

## 2022-03-18 PROBLEM — M48.062 LUMBAR STENOSIS WITH NEUROGENIC CLAUDICATION: Status: ACTIVE | Noted: 2018-04-24

## 2022-03-18 PROBLEM — Z51.89 VISIT FOR WOUND CHECK: Status: ACTIVE | Noted: 2018-07-05

## 2022-03-19 PROBLEM — I87.2 VENOUS INSUFFICIENCY OF BOTH LOWER EXTREMITIES: Status: ACTIVE | Noted: 2018-12-27

## 2022-03-19 PROBLEM — E66.01 SEVERE OBESITY (BMI 35.0-39.9) WITH COMORBIDITY (HCC): Status: ACTIVE | Noted: 2018-04-24

## 2022-03-19 PROBLEM — G89.4 CHRONIC PAIN SYNDROME: Status: ACTIVE | Noted: 2017-11-13

## 2022-03-19 PROBLEM — T81.49XA POSTOPERATIVE WOUND INFECTION: Status: ACTIVE | Noted: 2018-06-21

## 2022-12-28 ENCOUNTER — APPOINTMENT (OUTPATIENT)
Dept: GENERAL RADIOLOGY | Age: 67
End: 2022-12-28
Payer: MEDICARE

## 2022-12-28 ENCOUNTER — HOSPITAL ENCOUNTER (EMERGENCY)
Age: 67
Discharge: HOME | End: 2022-12-28
Attending: EMERGENCY MEDICINE
Payer: MEDICARE

## 2022-12-28 VITALS
WEIGHT: 255 LBS | SYSTOLIC BLOOD PRESSURE: 163 MMHG | HEIGHT: 68 IN | HEART RATE: 85 BPM | RESPIRATION RATE: 16 BRPM | OXYGEN SATURATION: 94 % | TEMPERATURE: 97.7 F | DIASTOLIC BLOOD PRESSURE: 72 MMHG | BODY MASS INDEX: 38.65 KG/M2

## 2022-12-28 DIAGNOSIS — J18.9 PNEUMONIA DUE TO INFECTIOUS ORGANISM, UNSPECIFIED LATERALITY, UNSPECIFIED PART OF LUNG: Primary | ICD-10-CM

## 2022-12-28 LAB
ALBUMIN SERPL-MCNC: 3.7 G/DL (ref 3.2–4.6)
ALBUMIN/GLOB SERPL: 0.9 {RATIO} (ref 0.4–1.6)
ALP SERPL-CCNC: 47 U/L (ref 50–136)
ALT SERPL-CCNC: 28 U/L (ref 12–65)
ANION GAP SERPL CALC-SCNC: 7 MMOL/L (ref 2–11)
AST SERPL-CCNC: 37 U/L (ref 15–37)
BASOPHILS # BLD: 0.1 K/UL (ref 0–0.2)
BASOPHILS NFR BLD: 1 % (ref 0–2)
BILIRUB SERPL-MCNC: 0.6 MG/DL (ref 0.2–1.1)
BUN SERPL-MCNC: 16 MG/DL (ref 8–23)
CALCIUM SERPL-MCNC: 9.9 MG/DL (ref 8.3–10.4)
CHLORIDE SERPL-SCNC: 107 MMOL/L (ref 101–110)
CO2 SERPL-SCNC: 27 MMOL/L (ref 21–32)
CREAT SERPL-MCNC: 1.2 MG/DL (ref 0.8–1.5)
DIFFERENTIAL METHOD BLD: ABNORMAL
EKG ATRIAL RATE: 89 BPM
EKG DIAGNOSIS: NORMAL
EKG P AXIS: 60 DEGREES
EKG P-R INTERVAL: 192 MS
EKG Q-T INTERVAL: 356 MS
EKG QRS DURATION: 92 MS
EKG QTC CALCULATION (BAZETT): 433 MS
EKG R AXIS: 95 DEGREES
EKG T AXIS: 15 DEGREES
EKG VENTRICULAR RATE: 89 BPM
EOSINOPHIL # BLD: 0.2 K/UL (ref 0–0.8)
EOSINOPHIL NFR BLD: 1 % (ref 0.5–7.8)
ERYTHROCYTE [DISTWIDTH] IN BLOOD BY AUTOMATED COUNT: 15.2 % (ref 11.9–14.6)
GLOBULIN SER CALC-MCNC: 4.1 G/DL (ref 2.8–4.5)
GLUCOSE SERPL-MCNC: 138 MG/DL (ref 65–100)
HCT VFR BLD AUTO: 44.6 % (ref 41.1–50.3)
HGB BLD-MCNC: 14.7 G/DL (ref 13.6–17.2)
IMM GRANULOCYTES # BLD AUTO: 0.1 K/UL (ref 0–0.5)
IMM GRANULOCYTES NFR BLD AUTO: 1 % (ref 0–5)
LYMPHOCYTES # BLD: 1.2 K/UL (ref 0.5–4.6)
LYMPHOCYTES NFR BLD: 10 % (ref 13–44)
MAGNESIUM SERPL-MCNC: 1.7 MG/DL (ref 1.8–2.4)
MCH RBC QN AUTO: 31.4 PG (ref 26.1–32.9)
MCHC RBC AUTO-ENTMCNC: 33 G/DL (ref 31.4–35)
MCV RBC AUTO: 95.3 FL (ref 82–102)
MONOCYTES # BLD: 0.8 K/UL (ref 0.1–1.3)
MONOCYTES NFR BLD: 6 % (ref 4–12)
NEUTS SEG # BLD: 10 K/UL (ref 1.7–8.2)
NEUTS SEG NFR BLD: 81 % (ref 43–78)
NRBC # BLD: 0 K/UL (ref 0–0.2)
NT PRO BNP: 206 PG/ML (ref 5–125)
PLATELET # BLD AUTO: 210 K/UL (ref 150–450)
PMV BLD AUTO: 10.6 FL (ref 9.4–12.3)
POTASSIUM SERPL-SCNC: 3.9 MMOL/L (ref 3.5–5.1)
PROT SERPL-MCNC: 7.8 G/DL (ref 6.3–8.2)
RBC # BLD AUTO: 4.68 M/UL (ref 4.23–5.6)
SODIUM SERPL-SCNC: 141 MMOL/L (ref 133–143)
TROPONIN I SERPL HS-MCNC: 11 PG/ML (ref 0–14)
WBC # BLD AUTO: 12.3 K/UL (ref 4.3–11.1)

## 2022-12-28 PROCEDURE — 71045 X-RAY EXAM CHEST 1 VIEW: CPT

## 2022-12-28 PROCEDURE — 85025 COMPLETE CBC W/AUTO DIFF WBC: CPT

## 2022-12-28 PROCEDURE — 83880 ASSAY OF NATRIURETIC PEPTIDE: CPT

## 2022-12-28 PROCEDURE — 99285 EMERGENCY DEPT VISIT HI MDM: CPT

## 2022-12-28 PROCEDURE — 84484 ASSAY OF TROPONIN QUANT: CPT

## 2022-12-28 PROCEDURE — 83735 ASSAY OF MAGNESIUM: CPT

## 2022-12-28 PROCEDURE — 93005 ELECTROCARDIOGRAM TRACING: CPT | Performed by: PHYSICIAN ASSISTANT

## 2022-12-28 PROCEDURE — 80053 COMPREHEN METABOLIC PANEL: CPT

## 2022-12-28 PROCEDURE — 6370000000 HC RX 637 (ALT 250 FOR IP): Performed by: PHYSICIAN ASSISTANT

## 2022-12-28 RX ORDER — DOXYCYCLINE HYCLATE 100 MG
100 TABLET ORAL 2 TIMES DAILY
Qty: 20 TABLET | Refills: 0 | Status: SHIPPED | OUTPATIENT
Start: 2022-12-28 | End: 2023-01-07

## 2022-12-28 RX ORDER — DOXYCYCLINE HYCLATE 100 MG/1
100 CAPSULE ORAL
Status: COMPLETED | OUTPATIENT
Start: 2022-12-28 | End: 2022-12-28

## 2022-12-28 RX ORDER — HYDROCODONE BITARTRATE AND ACETAMINOPHEN 5; 325 MG/1; MG/1
1 TABLET ORAL
Status: COMPLETED | OUTPATIENT
Start: 2022-12-28 | End: 2022-12-28

## 2022-12-28 RX ADMIN — DOXYCYCLINE HYCLATE 100 MG: 100 CAPSULE ORAL at 10:56

## 2022-12-28 RX ADMIN — HYDROCODONE BITARTRATE AND ACETAMINOPHEN 1 TABLET: 5; 325 TABLET ORAL at 10:56

## 2022-12-28 ASSESSMENT — PAIN DESCRIPTION - ORIENTATION: ORIENTATION: MID

## 2022-12-28 ASSESSMENT — PAIN DESCRIPTION - DESCRIPTORS: DESCRIPTORS: ACHING

## 2022-12-28 ASSESSMENT — PAIN - FUNCTIONAL ASSESSMENT: PAIN_FUNCTIONAL_ASSESSMENT: 0-10

## 2022-12-28 ASSESSMENT — PAIN DESCRIPTION - LOCATION: LOCATION: CHEST

## 2022-12-28 ASSESSMENT — PAIN SCALES - GENERAL: PAINLEVEL_OUTOF10: 9

## 2022-12-28 NOTE — ED NOTES
I have reviewed discharge instructions with the patient and spouse. The patient and spouse verbalized understanding. Patient left ED via Discharge Method: ambulatory to Home with spouse. Opportunity for questions and clarification provided. Patient given 1 scripts. To continue your aftercare when you leave the hospital, you may receive an automated call from our care team to check in on how you are doing. This is a free service and part of our promise to provide the best care and service to meet your aftercare needs.  If you have questions, or wish to unsubscribe from this service please call [phone]. Thank you for Choosing our [facility] Emergency Department.         Maliha Wren RN  12/28/22 7545

## 2022-12-28 NOTE — DISCHARGE INSTRUCTIONS
Use meds as directed for pneumonia. Take all current at home medications.   See your primary care physician next week for routine recheck return to ER symptoms worsen

## 2022-12-28 NOTE — ED PROVIDER NOTES
[facility] Emergency Department Provider Note                   PCP:                Kenya Hendrickson MD               Age: 79 y.o. Sex: male       ICD-10-CM    1. Pneumonia due to infectious organism, unspecified laterality, unspecified part of lung  J18.9           DISPOSITION Decision To Discharge 12/28/2022 10:55:37 AM       New Prescriptions    DOXYCYCLINE HYCLATE (VIBRA-TABS) 100 MG TABLET    Take 1 tablet by mouth 2 times daily for 10 days       Orders Placed This Encounter   Procedures    XR CHEST 1 VIEW    CBC with Auto Differential    Comprehensive Metabolic Panel    Magnesium    Troponin    Brain Natriuretic Peptide    EKG 12 Lead    Saline lock Zari Tirado is a 79 y.o. male who presents to the Emergency Department with chief complaint of    Chief Complaint   Patient presents with    Shortness of Breath      Patient to ER complaining of anterior chest discomfort started about 4 AM.  He states he went to bed fine. He did take his Xanax this morning but no other at home medications. He denies any fever. No recent cough. He does have a history of hypertension but has not taken any of his morning meds also recently placed on oral medications for diabetes, patient denies any recent travel surgery or trauma. Denies smoking    Past Medical History:  No date: Acquired hypertrophic pyloric stenosis  No date: Adverse effect of anesthesia      Comment:  wife states had delayed awaken  No date:  Anxiety      Comment:  takes alprazolam daily  No date: Arthritis  No date: Benign neoplasm of colon  2/30/2014: Bilateral carotid artery stenosis  03/23/2021: COVID-19 vaccine series completed      Comment:  Jeanette Falk; first dose given 2/23/2021  No date: Dysphagia  No date: Edema      Comment:  BLE; takes spironolactone daily  No date: GERD (gastroesophageal reflux disease)      Comment:  managed w/med  No date: Hypertension  No date: Inguinal hernia      Comment:  left per pt  40 yrs ago: PUD (peptic ulcer disease)      Comment:  pt states no current issues  4/24/2018: Severe obesity (BMI 35.0-39. 9) with comorbidity ([address]  10/14/2015: Status post left hip replacement  2002: Stroke [facility])      Comment:  pt states still limps w/right leg, has problems with                speech & problems writing at times     Past Surgical History:  No date: ADENOIDECTOMY  No date: APPENDECTOMY  2002: CAROTID ENDARTERECTOMY; Bilateral  No date: GI      Comment:  esophageal dilation  No date: HERNIA REPAIR  05/30/2018: ORTHOPEDIC SURGERY      Comment:  lumbar surgery, bilateral laminectomy  No date: ORTHOPEDIC SURGERY; Right      Comment:  knee  No date: ORTHOPEDIC SURGERY; Left      Comment:  thumb; bone removed  No date: ORTHOPEDIC SURGERY; Right      Comment:  middle finger sx  No date: TONSILLECTOMY  No date: TOTAL HIP ARTHROPLASTY  No date: UPPER GASTROINTESTINAL ENDOSCOPY      Comment:  multiple with dilation  No date: UROLOGICAL SURGERY      Comment:  testicular sx due to injury as a child         Review of Systems   All other systems reviewed and are negative. All other systems reviewed and are negative. Past Medical History:   Diagnosis Date    Acquired hypertrophic pyloric stenosis     Adverse effect of anesthesia     wife states had delayed awaken    Anxiety     takes alprazolam daily    Arthritis     Benign neoplasm of colon     Bilateral carotid artery stenosis 2/30/2014    COVID-19 vaccine series completed 03/23/2021    Ruddy Hutchinson; first dose given 2/23/2021    Dysphagia     Edema     BLE; takes spironolactone daily    GERD (gastroesophageal reflux disease)     managed w/med    Hypertension     Inguinal hernia     left per pt    PUD (peptic ulcer disease) 40 yrs ago    pt states no current issues    Severe obesity (BMI 35.0-39. 9) with comorbidity ([address] 4/24/2018    Status post left hip replacement 10/14/2015    [facility]) 2002    pt states still limps w/right leg, has problems with speech & problems writing at times        Past Surgical History:   Procedure Laterality Date    ADENOIDECTOMY      APPENDECTOMY      CAROTID ENDARTERECTOMY Bilateral 2002    GI      esophageal dilation    HERNIA REPAIR      ORTHOPEDIC SURGERY  05/30/2018    lumbar surgery, bilateral laminectomy    ORTHOPEDIC SURGERY Right     knee    ORTHOPEDIC SURGERY Left     thumb; bone removed    ORTHOPEDIC SURGERY Right     middle finger sx    TONSILLECTOMY      TOTAL HIP ARTHROPLASTY      UPPER GASTROINTESTINAL ENDOSCOPY      multiple with dilation    UROLOGICAL SURGERY      testicular sx due to injury as a child        Family History   Problem Relation Age of Onset    Osteoarthritis Mother     Diabetes Father     Osteoarthritis Father     Cancer Maternal Grandmother     Breast Cancer Neg Hx     Diabetes Mother         Social Connections: Not on file        Allergies   Allergen Reactions    Beta Vulgaris Rash        Vitals signs and nursing note reviewed. Patient Vitals for the past 4 hrs:   Temp Pulse Resp BP SpO2   12/28/22 1045 -- 85 16 (!) 163/72 94 %   12/28/22 0911 -- -- 20 -- --   12/28/22 0901 97.7 °F (36.5 °C) 90 -- (!) 174/95 94 %          Physical Exam  Vitals reviewed. Constitutional:       Appearance: Normal appearance. He is normal weight. HENT:      Head: Normocephalic and atraumatic. Right Ear: External ear normal.      Left Ear: External ear normal.      Nose: Nose normal.      Mouth/Throat:      Mouth: Mucous membranes are moist.      Pharynx: Oropharynx is clear. Eyes:      Extraocular Movements: Extraocular movements intact. Conjunctiva/sclera: Conjunctivae normal.      Pupils: Pupils are equal, round, and reactive to light. Cardiovascular:      Rate and Rhythm: Normal rate and regular rhythm. Pulmonary:      Effort: Pulmonary effort is normal.      Breath sounds: Normal breath sounds. No decreased breath sounds, wheezing, rhonchi or rales. Chest:      Chest wall: Tenderness present.       Comments: Anterior area pain to palpation worse with deep inspiration. Lungs are clear to auscultation no skin changes noted  Abdominal:      General: Abdomen is flat. Bowel sounds are normal.      Palpations: Abdomen is soft. Musculoskeletal:         General: Normal range of motion. Cervical back: Normal range of motion and neck supple. Skin:     General: Skin is warm and dry. Neurological:      General: No focal deficit present. Mental Status: He is alert and oriented to person, place, and time. Psychiatric:         Mood and Affect: Mood normal.         Behavior: Behavior normal.        MDM  Number of Diagnoses or Management Options  Pneumonia due to infectious organism, unspecified laterality, unspecified part of lung  Diagnosis management comments: Labs Reviewed  CBC WITH AUTO DIFFERENTIAL - Abnormal; Notable for the following components:     WBC                           12.3 (*)               RDW                           15.2 (*)               Seg Neutrophils               81 (*)                 Lymphocytes                   10 (*)                 Segs Absolute                 10.0 (*)            All other components within normal limits  COMPREHENSIVE METABOLIC PANEL - Abnormal; Notable for the following components:     Glucose                       138 (*)                Alk Phosphatase               47 (*)              All other components within normal limits  MAGNESIUM - Abnormal; Notable for the following components:     Magnesium                     1.7 (*)             All other components within normal limits  BRAIN NATRIURETIC PEPTIDE - Abnormal; Notable for the following components:     NT Pro-BNP                    206 (*)             All other components within normal limits  TROPONIN     Patient did have nuclear test test last year that was negative also cardiac echo.     ekG interpretation sinus rhythm at 87 bpm no ectopy no signs of acute MI    Chest discomfort due to pneumonia will place on doxycycline stressed patient to continue all at home medications see his primary care physician next week for routine recheck return to ER symptoms worsen       Amount and/or Complexity of Data Reviewed  Clinical lab tests: ordered and reviewed  Tests in the radiology section of CPT®: ordered and reviewed  Review and summarize past medical records: yes  Independent visualization of images, tracings, or specimens: yes    Risk of Complications, Morbidity, and/or Mortality  Presenting problems: moderate  Diagnostic procedures: moderate  Management options: moderate    Patient Progress  Patient progress: improved      Procedures    Labs Reviewed   CBC WITH AUTO DIFFERENTIAL - Abnormal; Notable for the following components:       Result Value    WBC 12.3 (*)     RDW 15.2 (*)     Seg Neutrophils 81 (*)     Lymphocytes 10 (*)     Segs Absolute 10.0 (*)     All other components within normal limits   COMPREHENSIVE METABOLIC PANEL - Abnormal; Notable for the following components:    Glucose 138 (*)     Alk Phosphatase 47 (*)     All other components within normal limits   MAGNESIUM - Abnormal; Notable for the following components:    Magnesium 1.7 (*)     All other components within normal limits   BRAIN NATRIURETIC PEPTIDE - Abnormal; Notable for the following components:    NT Pro- (*)     All other components within normal limits   TROPONIN        XR CHEST 1 VIEW   Final Result   Minimal left lower lobe infiltrate/atelectasis                  [place] Coma Scale  Eye Opening: Spontaneous  Best Verbal Response: Oriented  Best Motor Response: Obeys commands  [place] Coma Scale Score: 15                     Voice dictation software was used during the making of this note. This software is not perfect and grammatical and other typographical errors may be present. This note has not been completely proofread for errors.      JOSE ALFREDO Lerma  12/28/22 [id number]  12/28/22 4814

## 2023-11-24 ENCOUNTER — HOSPITAL ENCOUNTER (INPATIENT)
Age: 68
LOS: 4 days | Discharge: HOME | DRG: 438 | End: 2023-11-28
Attending: EMERGENCY MEDICINE | Admitting: FAMILY MEDICINE
Payer: MEDICARE

## 2023-11-24 ENCOUNTER — APPOINTMENT (OUTPATIENT)
Dept: ULTRASOUND IMAGING | Age: 68
DRG: 438 | End: 2023-11-24
Payer: MEDICARE

## 2023-11-24 DIAGNOSIS — R10.13 ABDOMINAL PAIN, EPIGASTRIC: ICD-10-CM

## 2023-11-24 DIAGNOSIS — K85.90 ACUTE PANCREATITIS, UNSPECIFIED COMPLICATION STATUS, UNSPECIFIED PANCREATITIS TYPE: Primary | ICD-10-CM

## 2023-11-24 PROBLEM — J18.9 PNEUMONIA DUE TO INFECTIOUS ORGANISM: Status: RESOLVED | Noted: 2022-12-28 | Resolved: 2023-11-24

## 2023-11-24 PROBLEM — F10.21 ALCOHOL USE DISORDER, MODERATE, IN EARLY REMISSION, DEPENDENCE (HCC): Chronic | Status: ACTIVE | Noted: 2023-11-24

## 2023-11-24 PROBLEM — N18.32 STAGE 3B CHRONIC KIDNEY DISEASE (HCC): Chronic | Status: ACTIVE | Noted: 2023-11-24

## 2023-11-24 PROBLEM — E83.52 HYPERCALCEMIA: Status: ACTIVE | Noted: 2023-11-24

## 2023-11-24 PROBLEM — R46.7: Status: ACTIVE | Noted: 2023-11-24

## 2023-11-24 PROBLEM — E66.01 SEVERE OBESITY (BMI 35.0-39.9) WITH COMORBIDITY (HCC): Status: ACTIVE | Noted: 2018-04-24

## 2023-11-24 LAB
ALBUMIN SERPL-MCNC: 4.2 G/DL (ref 3.2–4.6)
ALBUMIN/GLOB SERPL: 0.9 (ref 0.4–1.6)
ALP SERPL-CCNC: 37 U/L (ref 50–136)
ALT SERPL-CCNC: 20 U/L (ref 12–65)
ANION GAP SERPL CALC-SCNC: 5 MMOL/L (ref 2–11)
APPEARANCE UR: CLEAR
AST SERPL-CCNC: 19 U/L (ref 15–37)
BILIRUB SERPL-MCNC: 0.5 MG/DL (ref 0.2–1.1)
BILIRUB UR QL: NEGATIVE
BUN SERPL-MCNC: 24 MG/DL (ref 8–23)
CALCIUM SERPL-MCNC: 11 MG/DL (ref 8.3–10.4)
CHLORIDE SERPL-SCNC: 104 MMOL/L (ref 101–110)
CO2 SERPL-SCNC: 31 MMOL/L (ref 21–32)
COLOR UR: NORMAL
CREAT SERPL-MCNC: 1.7 MG/DL (ref 0.8–1.5)
ERYTHROCYTE [DISTWIDTH] IN BLOOD BY AUTOMATED COUNT: 14.4 % (ref 11.9–14.6)
ETHANOL SERPL-MCNC: <3 MG/DL (ref 0–0.08)
FOLATE SERPL-MCNC: 41.7 NG/ML (ref 3.1–17.5)
GLOBULIN SER CALC-MCNC: 4.5 G/DL (ref 2.8–4.5)
GLUCOSE SERPL-MCNC: 133 MG/DL (ref 65–100)
GLUCOSE UR STRIP.AUTO-MCNC: NEGATIVE MG/DL
HCT VFR BLD AUTO: 38.8 % (ref 41.1–50.3)
HGB BLD-MCNC: 12.7 G/DL (ref 13.6–17.2)
HGB UR QL STRIP: NEGATIVE
KETONES UR QL STRIP.AUTO: NEGATIVE MG/DL
LEUKOCYTE ESTERASE UR QL STRIP.AUTO: NEGATIVE
LIPASE SERPL-CCNC: 1123 U/L (ref 73–393)
MAGNESIUM SERPL-MCNC: 1.9 MG/DL (ref 1.8–2.4)
MCH RBC QN AUTO: 30.7 PG (ref 26.1–32.9)
MCHC RBC AUTO-ENTMCNC: 32.7 G/DL (ref 31.4–35)
MCV RBC AUTO: 93.7 FL (ref 82–102)
NITRITE UR QL STRIP.AUTO: NEGATIVE
NRBC # BLD: 0 K/UL (ref 0–0.2)
PH UR STRIP: 6.5 (ref 5–9)
PLATELET # BLD AUTO: 330 K/UL (ref 150–450)
PMV BLD AUTO: 10.2 FL (ref 9.4–12.3)
POTASSIUM SERPL-SCNC: 3.8 MMOL/L (ref 3.5–5.1)
PROT SERPL-MCNC: 8.7 G/DL (ref 6.3–8.2)
PROT UR STRIP-MCNC: NEGATIVE MG/DL
RBC # BLD AUTO: 4.14 M/UL (ref 4.23–5.6)
SODIUM SERPL-SCNC: 140 MMOL/L (ref 133–143)
SP GR UR REFRACTOMETRY: 1.01 (ref 1–1.02)
UROBILINOGEN UR QL STRIP.AUTO: 0.2 EU/DL (ref 0.2–1)
VIT B12 SERPL-MCNC: 522 PG/ML (ref 193–986)
WBC # BLD AUTO: 11.2 K/UL (ref 4.3–11.1)

## 2023-11-24 PROCEDURE — A4216 STERILE WATER/SALINE, 10 ML: HCPCS | Performed by: FAMILY MEDICINE

## 2023-11-24 PROCEDURE — 76705 ECHO EXAM OF ABDOMEN: CPT

## 2023-11-24 PROCEDURE — 83690 ASSAY OF LIPASE: CPT

## 2023-11-24 PROCEDURE — 2500000003 HC RX 250 WO HCPCS: Performed by: EMERGENCY MEDICINE

## 2023-11-24 PROCEDURE — 82746 ASSAY OF FOLIC ACID SERUM: CPT

## 2023-11-24 PROCEDURE — 96374 THER/PROPH/DIAG INJ IV PUSH: CPT

## 2023-11-24 PROCEDURE — 85027 COMPLETE CBC AUTOMATED: CPT

## 2023-11-24 PROCEDURE — 80053 COMPREHEN METABOLIC PANEL: CPT

## 2023-11-24 PROCEDURE — 83735 ASSAY OF MAGNESIUM: CPT

## 2023-11-24 PROCEDURE — 1100000000 HC RM PRIVATE

## 2023-11-24 PROCEDURE — 99285 EMERGENCY DEPT VISIT HI MDM: CPT

## 2023-11-24 PROCEDURE — 6370000000 HC RX 637 (ALT 250 FOR IP): Performed by: EMERGENCY MEDICINE

## 2023-11-24 PROCEDURE — C9113 INJ PANTOPRAZOLE SODIUM, VIA: HCPCS | Performed by: FAMILY MEDICINE

## 2023-11-24 PROCEDURE — 82077 ASSAY SPEC XCP UR&BREATH IA: CPT

## 2023-11-24 PROCEDURE — 82607 VITAMIN B-12: CPT

## 2023-11-24 PROCEDURE — 6370000000 HC RX 637 (ALT 250 FOR IP): Performed by: FAMILY MEDICINE

## 2023-11-24 PROCEDURE — 96375 TX/PRO/DX INJ NEW DRUG ADDON: CPT

## 2023-11-24 PROCEDURE — 6360000002 HC RX W HCPCS: Performed by: FAMILY MEDICINE

## 2023-11-24 PROCEDURE — 2580000003 HC RX 258: Performed by: FAMILY MEDICINE

## 2023-11-24 PROCEDURE — 6360000002 HC RX W HCPCS: Performed by: EMERGENCY MEDICINE

## 2023-11-24 PROCEDURE — 2500000003 HC RX 250 WO HCPCS: Performed by: FAMILY MEDICINE

## 2023-11-24 PROCEDURE — 81003 URINALYSIS AUTO W/O SCOPE: CPT

## 2023-11-24 PROCEDURE — 2580000003 HC RX 258: Performed by: EMERGENCY MEDICINE

## 2023-11-24 PROCEDURE — 93005 ELECTROCARDIOGRAM TRACING: CPT | Performed by: EMERGENCY MEDICINE

## 2023-11-24 RX ORDER — FENOFIBRATE 160 MG/1
160 TABLET ORAL DAILY
Status: DISCONTINUED | OUTPATIENT
Start: 2023-11-25 | End: 2023-11-24

## 2023-11-24 RX ORDER — 0.9 % SODIUM CHLORIDE 0.9 %
1000 INTRAVENOUS SOLUTION INTRAVENOUS ONCE
Status: COMPLETED | OUTPATIENT
Start: 2023-11-24 | End: 2023-11-24

## 2023-11-24 RX ORDER — LORAZEPAM 2 MG/ML
4 INJECTION INTRAMUSCULAR
Status: DISCONTINUED | OUTPATIENT
Start: 2023-11-24 | End: 2023-11-28 | Stop reason: HOSPADM

## 2023-11-24 RX ORDER — ONDANSETRON 4 MG/1
4 TABLET, ORALLY DISINTEGRATING ORAL EVERY 8 HOURS PRN
Status: DISCONTINUED | OUTPATIENT
Start: 2023-11-24 | End: 2023-11-28 | Stop reason: HOSPADM

## 2023-11-24 RX ORDER — LORAZEPAM 1 MG/1
1 TABLET ORAL
Status: DISCONTINUED | OUTPATIENT
Start: 2023-11-24 | End: 2023-11-28 | Stop reason: HOSPADM

## 2023-11-24 RX ORDER — HYDROMORPHONE HYDROCHLORIDE 1 MG/ML
0.5 INJECTION, SOLUTION INTRAMUSCULAR; INTRAVENOUS; SUBCUTANEOUS
Status: DISCONTINUED | OUTPATIENT
Start: 2023-11-24 | End: 2023-11-28 | Stop reason: HOSPADM

## 2023-11-24 RX ORDER — TORSEMIDE 20 MG/1
20 TABLET ORAL 2 TIMES DAILY
Status: DISCONTINUED | OUTPATIENT
Start: 2023-11-25 | End: 2023-11-28 | Stop reason: HOSPADM

## 2023-11-24 RX ORDER — ONDANSETRON 2 MG/ML
4 INJECTION INTRAMUSCULAR; INTRAVENOUS EVERY 6 HOURS PRN
Status: DISCONTINUED | OUTPATIENT
Start: 2023-11-24 | End: 2023-11-28 | Stop reason: HOSPADM

## 2023-11-24 RX ORDER — LANOLIN ALCOHOL/MO/W.PET/CERES
1000 CREAM (GRAM) TOPICAL DAILY
Status: COMPLETED | OUTPATIENT
Start: 2023-11-24 | End: 2023-11-26

## 2023-11-24 RX ORDER — NALOXONE HYDROCHLORIDE 0.4 MG/ML
0.4 INJECTION, SOLUTION INTRAMUSCULAR; INTRAVENOUS; SUBCUTANEOUS PRN
Status: DISCONTINUED | OUTPATIENT
Start: 2023-11-24 | End: 2023-11-28 | Stop reason: HOSPADM

## 2023-11-24 RX ORDER — LORAZEPAM 1 MG/1
4 TABLET ORAL
Status: DISCONTINUED | OUTPATIENT
Start: 2023-11-24 | End: 2023-11-28 | Stop reason: HOSPADM

## 2023-11-24 RX ORDER — FOLIC ACID 1 MG/1
1 TABLET ORAL DAILY
Status: COMPLETED | OUTPATIENT
Start: 2023-11-24 | End: 2023-11-26

## 2023-11-24 RX ORDER — MULTIVITAMIN WITH IRON
1 TABLET ORAL DAILY
Status: COMPLETED | OUTPATIENT
Start: 2023-11-24 | End: 2023-11-26

## 2023-11-24 RX ORDER — SODIUM CHLORIDE, SODIUM LACTATE, POTASSIUM CHLORIDE, CALCIUM CHLORIDE 600; 310; 30; 20 MG/100ML; MG/100ML; MG/100ML; MG/100ML
INJECTION, SOLUTION INTRAVENOUS CONTINUOUS
Status: DISCONTINUED | OUTPATIENT
Start: 2023-11-25 | End: 2023-11-28 | Stop reason: HOSPADM

## 2023-11-24 RX ORDER — SODIUM CHLORIDE 0.9 % (FLUSH) 0.9 %
5-40 SYRINGE (ML) INJECTION PRN
Status: DISCONTINUED | OUTPATIENT
Start: 2023-11-24 | End: 2023-11-28 | Stop reason: HOSPADM

## 2023-11-24 RX ORDER — SUCRALFATE 1 G/1
1 TABLET ORAL
Status: COMPLETED | OUTPATIENT
Start: 2023-11-24 | End: 2023-11-24

## 2023-11-24 RX ORDER — LORAZEPAM 2 MG/ML
1 INJECTION INTRAMUSCULAR
Status: DISCONTINUED | OUTPATIENT
Start: 2023-11-24 | End: 2023-11-28 | Stop reason: HOSPADM

## 2023-11-24 RX ORDER — OXYCODONE HYDROCHLORIDE 5 MG/1
5 TABLET ORAL
Status: DISCONTINUED | OUTPATIENT
Start: 2023-11-24 | End: 2023-11-28 | Stop reason: HOSPADM

## 2023-11-24 RX ORDER — LACTULOSE 10 G/15ML
10 SOLUTION ORAL 3 TIMES DAILY PRN
Status: DISCONTINUED | OUTPATIENT
Start: 2023-11-24 | End: 2023-11-28 | Stop reason: HOSPADM

## 2023-11-24 RX ORDER — ALPRAZOLAM 0.5 MG/1
1 TABLET ORAL 4 TIMES DAILY PRN
Status: DISCONTINUED | OUTPATIENT
Start: 2023-11-24 | End: 2023-11-28 | Stop reason: HOSPADM

## 2023-11-24 RX ORDER — ATORVASTATIN CALCIUM 40 MG/1
80 TABLET, FILM COATED ORAL NIGHTLY
Status: DISCONTINUED | OUTPATIENT
Start: 2023-11-25 | End: 2023-11-28 | Stop reason: HOSPADM

## 2023-11-24 RX ORDER — ONDANSETRON 2 MG/ML
4 INJECTION INTRAMUSCULAR; INTRAVENOUS
Status: COMPLETED | OUTPATIENT
Start: 2023-11-24 | End: 2023-11-24

## 2023-11-24 RX ORDER — LORAZEPAM 2 MG/ML
2 INJECTION INTRAMUSCULAR
Status: DISCONTINUED | OUTPATIENT
Start: 2023-11-24 | End: 2023-11-28 | Stop reason: HOSPADM

## 2023-11-24 RX ORDER — METOPROLOL SUCCINATE 100 MG/1
200 TABLET, EXTENDED RELEASE ORAL DAILY
Status: DISCONTINUED | OUTPATIENT
Start: 2023-11-25 | End: 2023-11-28 | Stop reason: HOSPADM

## 2023-11-24 RX ORDER — HEPARIN SODIUM 5000 [USP'U]/ML
5000 INJECTION, SOLUTION INTRAVENOUS; SUBCUTANEOUS EVERY 8 HOURS SCHEDULED
Status: DISCONTINUED | OUTPATIENT
Start: 2023-11-24 | End: 2023-11-26

## 2023-11-24 RX ORDER — HYDROMORPHONE HYDROCHLORIDE 1 MG/ML
0.5 INJECTION, SOLUTION INTRAMUSCULAR; INTRAVENOUS; SUBCUTANEOUS
Status: COMPLETED | OUTPATIENT
Start: 2023-11-24 | End: 2023-11-24

## 2023-11-24 RX ORDER — LORAZEPAM 1 MG/1
3 TABLET ORAL
Status: DISCONTINUED | OUTPATIENT
Start: 2023-11-24 | End: 2023-11-28 | Stop reason: HOSPADM

## 2023-11-24 RX ORDER — SODIUM CHLORIDE 0.9 % (FLUSH) 0.9 %
5-40 SYRINGE (ML) INJECTION EVERY 12 HOURS SCHEDULED
Status: DISCONTINUED | OUTPATIENT
Start: 2023-11-24 | End: 2023-11-28 | Stop reason: HOSPADM

## 2023-11-24 RX ORDER — SODIUM CHLORIDE, SODIUM LACTATE, POTASSIUM CHLORIDE, CALCIUM CHLORIDE 600; 310; 30; 20 MG/100ML; MG/100ML; MG/100ML; MG/100ML
INJECTION, SOLUTION INTRAVENOUS CONTINUOUS
Status: ACTIVE | OUTPATIENT
Start: 2023-11-24 | End: 2023-11-25

## 2023-11-24 RX ORDER — LORAZEPAM 2 MG/ML
3 INJECTION INTRAMUSCULAR
Status: DISCONTINUED | OUTPATIENT
Start: 2023-11-24 | End: 2023-11-28 | Stop reason: HOSPADM

## 2023-11-24 RX ORDER — HYDROMORPHONE HYDROCHLORIDE 1 MG/ML
1 INJECTION, SOLUTION INTRAMUSCULAR; INTRAVENOUS; SUBCUTANEOUS
Status: DISCONTINUED | OUTPATIENT
Start: 2023-11-24 | End: 2023-11-24

## 2023-11-24 RX ORDER — POTASSIUM CHLORIDE 20 MEQ/1
40 TABLET, EXTENDED RELEASE ORAL PRN
Status: DISCONTINUED | OUTPATIENT
Start: 2023-11-24 | End: 2023-11-28 | Stop reason: HOSPADM

## 2023-11-24 RX ORDER — LORAZEPAM 1 MG/1
2 TABLET ORAL
Status: DISCONTINUED | OUTPATIENT
Start: 2023-11-24 | End: 2023-11-28 | Stop reason: HOSPADM

## 2023-11-24 RX ORDER — SUCRALFATE 1 G/1
1 TABLET ORAL 4 TIMES DAILY
Status: DISCONTINUED | OUTPATIENT
Start: 2023-11-24 | End: 2023-11-28 | Stop reason: HOSPADM

## 2023-11-24 RX ORDER — HYDROMORPHONE HYDROCHLORIDE 1 MG/ML
0.5 INJECTION, SOLUTION INTRAMUSCULAR; INTRAVENOUS; SUBCUTANEOUS
Status: DISCONTINUED | OUTPATIENT
Start: 2023-11-24 | End: 2023-11-24

## 2023-11-24 RX ORDER — ACETAMINOPHEN 325 MG/1
650 TABLET ORAL 3 TIMES DAILY
Status: DISCONTINUED | OUTPATIENT
Start: 2023-11-24 | End: 2023-11-28 | Stop reason: HOSPADM

## 2023-11-24 RX ORDER — SODIUM CHLORIDE 9 MG/ML
INJECTION, SOLUTION INTRAVENOUS PRN
Status: DISCONTINUED | OUTPATIENT
Start: 2023-11-24 | End: 2023-11-28 | Stop reason: HOSPADM

## 2023-11-24 RX ORDER — MAGNESIUM SULFATE IN WATER 40 MG/ML
2000 INJECTION, SOLUTION INTRAVENOUS PRN
Status: DISCONTINUED | OUTPATIENT
Start: 2023-11-24 | End: 2023-11-28 | Stop reason: HOSPADM

## 2023-11-24 RX ORDER — HYDROMORPHONE HYDROCHLORIDE 1 MG/ML
1 INJECTION, SOLUTION INTRAMUSCULAR; INTRAVENOUS; SUBCUTANEOUS
Status: DISCONTINUED | OUTPATIENT
Start: 2023-11-24 | End: 2023-11-28 | Stop reason: HOSPADM

## 2023-11-24 RX ORDER — MAGNESIUM HYDROXIDE/ALUMINUM HYDROXICE/SIMETHICONE 120; 1200; 1200 MG/30ML; MG/30ML; MG/30ML
30 SUSPENSION ORAL EVERY 6 HOURS PRN
Status: DISCONTINUED | OUTPATIENT
Start: 2023-11-24 | End: 2023-11-28 | Stop reason: HOSPADM

## 2023-11-24 RX ORDER — THIAMINE HYDROCHLORIDE 100 MG/ML
100 INJECTION, SOLUTION INTRAMUSCULAR; INTRAVENOUS DAILY
Status: COMPLETED | OUTPATIENT
Start: 2023-11-24 | End: 2023-11-26

## 2023-11-24 RX ORDER — POTASSIUM CHLORIDE 7.45 MG/ML
10 INJECTION INTRAVENOUS PRN
Status: DISCONTINUED | OUTPATIENT
Start: 2023-11-24 | End: 2023-11-28 | Stop reason: HOSPADM

## 2023-11-24 RX ADMIN — HYDROMORPHONE HYDROCHLORIDE 1 MG: 1 INJECTION, SOLUTION INTRAMUSCULAR; INTRAVENOUS; SUBCUTANEOUS at 23:01

## 2023-11-24 RX ADMIN — THIAMINE HYDROCHLORIDE 100 MG: 100 INJECTION, SOLUTION INTRAMUSCULAR; INTRAVENOUS at 21:11

## 2023-11-24 RX ADMIN — B-COMPLEX W/ C & FOLIC ACID TAB 1 TABLET: TAB at 21:11

## 2023-11-24 RX ADMIN — SODIUM CHLORIDE, POTASSIUM CHLORIDE, SODIUM LACTATE AND CALCIUM CHLORIDE: 600; 310; 30; 20 INJECTION, SOLUTION INTRAVENOUS at 20:11

## 2023-11-24 RX ADMIN — SODIUM CHLORIDE, PRESERVATIVE FREE 10 ML: 5 INJECTION INTRAVENOUS at 21:12

## 2023-11-24 RX ADMIN — ALPRAZOLAM 1 MG: 0.5 TABLET ORAL at 23:54

## 2023-11-24 RX ADMIN — ONDANSETRON 4 MG: 2 INJECTION INTRAMUSCULAR; INTRAVENOUS at 17:22

## 2023-11-24 RX ADMIN — FOLIC ACID 1 MG: 1 TABLET ORAL at 21:11

## 2023-11-24 RX ADMIN — SUCRALFATE 1 G: 1 TABLET ORAL at 21:10

## 2023-11-24 RX ADMIN — CYANOCOBALAMIN TAB 1000 MCG 1000 MCG: 1000 TAB at 21:11

## 2023-11-24 RX ADMIN — PANTOPRAZOLE SODIUM 40 MG: 40 INJECTION, POWDER, FOR SOLUTION INTRAVENOUS at 19:57

## 2023-11-24 RX ADMIN — SODIUM CHLORIDE 1000 ML: 9 INJECTION, SOLUTION INTRAVENOUS at 17:26

## 2023-11-24 RX ADMIN — HEPARIN SODIUM 5000 UNITS: 5000 INJECTION INTRAVENOUS; SUBCUTANEOUS at 23:01

## 2023-11-24 RX ADMIN — HYDROMORPHONE HYDROCHLORIDE 0.5 MG: 1 INJECTION, SOLUTION INTRAMUSCULAR; INTRAVENOUS; SUBCUTANEOUS at 17:21

## 2023-11-24 RX ADMIN — SUCRALFATE 1 G: 1 TABLET ORAL at 14:09

## 2023-11-24 ASSESSMENT — PAIN SCALES - GENERAL
PAINLEVEL_OUTOF10: 4
PAINLEVEL_OUTOF10: 10
PAINLEVEL_OUTOF10: 8
PAINLEVEL_OUTOF10: 10
PAINLEVEL_OUTOF10: 8

## 2023-11-24 ASSESSMENT — PAIN DESCRIPTION - LOCATION
LOCATION: ABDOMEN

## 2023-11-24 ASSESSMENT — PAIN DESCRIPTION - ORIENTATION: ORIENTATION: ANTERIOR

## 2023-11-24 ASSESSMENT — PAIN DESCRIPTION - DESCRIPTORS
DESCRIPTORS: ACHING
DESCRIPTORS: ACHING

## 2023-11-24 ASSESSMENT — PAIN - FUNCTIONAL ASSESSMENT: PAIN_FUNCTIONAL_ASSESSMENT: 0-10

## 2023-11-24 NOTE — ED PROVIDER NOTES
Emergency Department Provider Note       PCP: Gil Turpin MD   Age: 76 y.o. Sex: male     DISPOSITION       No diagnosis found. Medical Decision Making     Complexity of Problems Addressed:  1 or more acute illnesses that pose a threat to life or bodily function. Data Reviewed and Analyzed:  I independently ordered and reviewed each unique test.  Reviewed recent GI work-up and prior history of abdominal issues and evaluation and thoughts and old records  The patients assessment required an independent historian: Family. The reason they were needed is their insight. I interpreted the Ultrasound  ultrasound did not show any overt cholelithiasis or diagnostic answers. .  I interpreted the blood work shows significantly elevated lipase consistent with probable pancreatitis. .  EKG:  Heart rate 71, sinus rhythm, no significant ectopy, no evidence of acute injury. Discussion of management or test interpretation. Patient has had multiple work-up recently for GI issues reviewed extensive lab work and do not really see where lipases have been done in any recent work-up though I may not have had availability of all labs drawn. Does have acute pancreatitis type history with confirmation on labs. The patient was admitted and I have discussed patient management with the admitting provider. Risk of Complications and/or Morbidity of Patient Management:  Hospitalist is consulted patient will probably need further imaging and work-up to evaluate this more extensively. Of note patient has had recent upper GI work-up with no findings. This may be a totally new event. Patient used to be up until approximately May of this year a drinker of daily significant amounts of 100 proof vodka states he drank a quart of that per day. He declines any recent alcohol consumption. History       Patient here with recurring abdominal pain. Pain is mainly upper abdomen.   Saw his primary care doctor on

## 2023-11-24 NOTE — ED TRIAGE NOTES
Pt arrives to ed via car from home. Pt has complaints of abdominal pain the radiates up towards neck and wraps around both flanks to back. Pt was diagnosed with an ulcer at 15 and believes this to be the issue. Pt is having issues eating, gets very ill with intake. 10/13 had scopes. Was given additional meds from his dr but not effective.

## 2023-11-24 NOTE — H&P
- 4.5 g/dL    Albumin/Globulin Ratio 0.9 0.4 - 1.6     CBC    Collection Time: 11/24/23  2:15 PM   Result Value Ref Range    WBC 11.2 (H) 4.3 - 11.1 K/uL    RBC 4.14 (L) 4.23 - 5.6 M/uL    Hemoglobin 12.7 (L) 13.6 - 17.2 g/dL    Hematocrit 38.8 (L) 41.1 - 50.3 %    MCV 93.7 82 - 102 FL    MCH 30.7 26.1 - 32.9 PG    MCHC 32.7 31.4 - 35.0 g/dL    RDW 14.4 11.9 - 14.6 %    Platelets 126 868 - 048 K/uL    MPV 10.2 9.4 - 12.3 FL    nRBC 0.00 0.0 - 0.2 K/uL   Magnesium    Collection Time: 11/24/23  2:15 PM   Result Value Ref Range    Magnesium 1.9 1.8 - 2.4 mg/dL   Lipase    Collection Time: 11/24/23  2:15 PM   Result Value Ref Range    Lipase 1,123 (H) 73 - 393 U/L   Urinalysis    Collection Time: 11/24/23  4:19 PM   Result Value Ref Range    Color, UA YELLOW/STRAW      Appearance CLEAR      Specific Gravity, UA 1.014 1.001 - 1.023      pH, Urine 6.5 5.0 - 9.0      Protein, UA Negative NEG mg/dL    Glucose, UA Negative mg/dL    Ketones, Urine Negative NEG mg/dL    Bilirubin Urine Negative NEG      Blood, Urine Negative NEG      Urobilinogen, Urine 0.2 0.2 - 1.0 EU/dL    Nitrite, Urine Negative NEG      Leukocyte Esterase, Urine Negative NEG     EKG 12 Lead    Collection Time: 11/24/23  5:07 PM   Result Value Ref Range    Ventricular Rate 71 BPM    Atrial Rate 71 BPM    P-R Interval 241 ms    QRS Duration 100 ms    Q-T Interval 402 ms    QTc Calculation (Bazett) 437 ms    P Axis -25 degrees    R Axis 61 degrees    T Axis 19 degrees    Diagnosis Sinus rhythm  Prolonged WV interval      Ethanol    Collection Time: 11/24/23  6:07 PM   Result Value Ref Range    Ethanol Lvl <3 MG/DL   Folate    Collection Time: 11/24/23  6:07 PM   Result Value Ref Range    Folate 41.7 (H) 3.1 - 17.5 ng/mL   Vitamin B12    Collection Time: 11/24/23  6:07 PM   Result Value Ref Range    Vitamin B-12 522 193 - 986 pg/mL       I have personally reviewed imaging studies:  No results found.       Signed:  Con Barrett, DO    Part of this note may

## 2023-11-24 NOTE — ED NOTES
TRANSFER - OUT REPORT:    Verbal report given to [employer] on [address]  being transferred to [address] [place] [address] for routine progression of patient care       Report consisted of patient's Situation, Background, Assessment and   Recommendations(SBAR). Information from the following report(s) Nurse Handoff Report, Index, ED SBAR, MAR, and Recent Results was reviewed with the receiving nurse. [place] Fall Assessment:    Presents to emergency department  because of falls (Syncope, seizure, or loss of consciousness): No  Age > 70: No     Impaired Mobility: Ambulates or transfers with assistive devices or assistance; Unable to ambulate or transer.: No  Nursing Judgement: No          Lines:   Peripheral IV 11/24/23 Left Antecubital (Active)   Site Assessment Clean, dry & intact 11/24/23 1757   Phlebitis Assessment No symptoms 11/24/23 1757   Infiltration Assessment 0 11/24/23 1757        Opportunity for questions and clarification was provided.       Patient transported with:  NS 20 L .5 mL/hr           Kavita Sol RN  11/24/23 1090

## 2023-11-25 ENCOUNTER — APPOINTMENT (OUTPATIENT)
Dept: CT IMAGING | Age: 68
DRG: 438 | End: 2023-11-25
Payer: MEDICARE

## 2023-11-25 LAB
ALBUMIN SERPL-MCNC: 3.4 G/DL (ref 3.2–4.6)
ALBUMIN/GLOB SERPL: 0.8 (ref 0.4–1.6)
ALP SERPL-CCNC: 32 U/L (ref 50–136)
ALT SERPL-CCNC: 18 U/L (ref 12–65)
ANION GAP SERPL CALC-SCNC: 4 MMOL/L (ref 2–11)
AST SERPL-CCNC: 18 U/L (ref 15–37)
BASOPHILS # BLD: 0.1 K/UL (ref 0–0.2)
BASOPHILS NFR BLD: 1 % (ref 0–2)
BILIRUB SERPL-MCNC: 0.4 MG/DL (ref 0.2–1.1)
BUN SERPL-MCNC: 24 MG/DL (ref 8–23)
CALCIUM SERPL-MCNC: 10.1 MG/DL (ref 8.3–10.4)
CHLORIDE SERPL-SCNC: 107 MMOL/L (ref 101–110)
CO2 SERPL-SCNC: 27 MMOL/L (ref 21–32)
CREAT SERPL-MCNC: 1.6 MG/DL (ref 0.8–1.5)
DIFFERENTIAL METHOD BLD: ABNORMAL
EKG ATRIAL RATE: 71 BPM
EKG DIAGNOSIS: NORMAL
EKG P AXIS: -25 DEGREES
EKG P-R INTERVAL: 241 MS
EKG Q-T INTERVAL: 402 MS
EKG QRS DURATION: 100 MS
EKG QTC CALCULATION (BAZETT): 437 MS
EKG R AXIS: 61 DEGREES
EKG T AXIS: 19 DEGREES
EKG VENTRICULAR RATE: 71 BPM
EOSINOPHIL # BLD: 0.3 K/UL (ref 0–0.8)
EOSINOPHIL NFR BLD: 4 % (ref 0.5–7.8)
ERYTHROCYTE [DISTWIDTH] IN BLOOD BY AUTOMATED COUNT: 14.3 % (ref 11.9–14.6)
EST. AVERAGE GLUCOSE BLD GHB EST-MCNC: 128 MG/DL
GLOBULIN SER CALC-MCNC: 4.1 G/DL (ref 2.8–4.5)
GLUCOSE BLD STRIP.AUTO-MCNC: 106 MG/DL (ref 65–100)
GLUCOSE BLD STRIP.AUTO-MCNC: 98 MG/DL (ref 65–100)
GLUCOSE SERPL-MCNC: 115 MG/DL (ref 65–100)
HBA1C MFR BLD: 6.1 % (ref 4.8–5.6)
HCT VFR BLD AUTO: 36.5 % (ref 41.1–50.3)
HGB BLD-MCNC: 11.7 G/DL (ref 13.6–17.2)
IMM GRANULOCYTES # BLD AUTO: 0 K/UL (ref 0–0.5)
IMM GRANULOCYTES NFR BLD AUTO: 0 % (ref 0–5)
LYMPHOCYTES # BLD: 2 K/UL (ref 0.5–4.6)
LYMPHOCYTES NFR BLD: 22 % (ref 13–44)
MAGNESIUM SERPL-MCNC: 1.7 MG/DL (ref 1.8–2.4)
MCH RBC QN AUTO: 30.2 PG (ref 26.1–32.9)
MCHC RBC AUTO-ENTMCNC: 32.1 G/DL (ref 31.4–35)
MCV RBC AUTO: 94.3 FL (ref 82–102)
MONOCYTES # BLD: 0.6 K/UL (ref 0.1–1.3)
MONOCYTES NFR BLD: 7 % (ref 4–12)
NEUTS SEG # BLD: 6 K/UL (ref 1.7–8.2)
NEUTS SEG NFR BLD: 66 % (ref 43–78)
NRBC # BLD: 0 K/UL (ref 0–0.2)
PHOSPHATE SERPL-MCNC: 3.8 MG/DL (ref 2.3–3.7)
PLATELET # BLD AUTO: 271 K/UL (ref 150–450)
PMV BLD AUTO: 10.1 FL (ref 9.4–12.3)
POTASSIUM SERPL-SCNC: 3.8 MMOL/L (ref 3.5–5.1)
PROT SERPL-MCNC: 7.5 G/DL (ref 6.3–8.2)
RBC # BLD AUTO: 3.87 M/UL (ref 4.23–5.6)
SERVICE CMNT-IMP: ABNORMAL
SERVICE CMNT-IMP: NORMAL
SODIUM SERPL-SCNC: 138 MMOL/L (ref 133–143)
TRIGL SERPL-MCNC: 257 MG/DL (ref 35–150)
WBC # BLD AUTO: 9 K/UL (ref 4.3–11.1)

## 2023-11-25 PROCEDURE — 2580000003 HC RX 258: Performed by: FAMILY MEDICINE

## 2023-11-25 PROCEDURE — 97165 OT EVAL LOW COMPLEX 30 MIN: CPT

## 2023-11-25 PROCEDURE — 84478 ASSAY OF TRIGLYCERIDES: CPT

## 2023-11-25 PROCEDURE — 80053 COMPREHEN METABOLIC PANEL: CPT

## 2023-11-25 PROCEDURE — 6370000000 HC RX 637 (ALT 250 FOR IP): Performed by: FAMILY MEDICINE

## 2023-11-25 PROCEDURE — 85025 COMPLETE CBC W/AUTO DIFF WBC: CPT

## 2023-11-25 PROCEDURE — C9113 INJ PANTOPRAZOLE SODIUM, VIA: HCPCS | Performed by: FAMILY MEDICINE

## 2023-11-25 PROCEDURE — 82787 IGG 1 2 3 OR 4 EACH: CPT

## 2023-11-25 PROCEDURE — 93010 ELECTROCARDIOGRAM REPORT: CPT | Performed by: INTERNAL MEDICINE

## 2023-11-25 PROCEDURE — 6360000002 HC RX W HCPCS: Performed by: FAMILY MEDICINE

## 2023-11-25 PROCEDURE — 83036 HEMOGLOBIN GLYCOSYLATED A1C: CPT

## 2023-11-25 PROCEDURE — 97161 PT EVAL LOW COMPLEX 20 MIN: CPT

## 2023-11-25 PROCEDURE — 83735 ASSAY OF MAGNESIUM: CPT

## 2023-11-25 PROCEDURE — 1100000000 HC RM PRIVATE

## 2023-11-25 PROCEDURE — A4216 STERILE WATER/SALINE, 10 ML: HCPCS | Performed by: FAMILY MEDICINE

## 2023-11-25 PROCEDURE — 36415 COLL VENOUS BLD VENIPUNCTURE: CPT

## 2023-11-25 PROCEDURE — 74177 CT ABD & PELVIS W/CONTRAST: CPT

## 2023-11-25 PROCEDURE — 6360000004 HC RX CONTRAST MEDICATION: Performed by: INTERNAL MEDICINE

## 2023-11-25 PROCEDURE — 84100 ASSAY OF PHOSPHORUS: CPT

## 2023-11-25 PROCEDURE — 2500000003 HC RX 250 WO HCPCS: Performed by: FAMILY MEDICINE

## 2023-11-25 PROCEDURE — 82962 GLUCOSE BLOOD TEST: CPT

## 2023-11-25 RX ORDER — INSULIN LISPRO 100 [IU]/ML
0-4 INJECTION, SOLUTION INTRAVENOUS; SUBCUTANEOUS
Status: DISCONTINUED | OUTPATIENT
Start: 2023-11-25 | End: 2023-11-28 | Stop reason: HOSPADM

## 2023-11-25 RX ORDER — INSULIN LISPRO 100 [IU]/ML
0-4 INJECTION, SOLUTION INTRAVENOUS; SUBCUTANEOUS NIGHTLY
Status: DISCONTINUED | OUTPATIENT
Start: 2023-11-25 | End: 2023-11-28 | Stop reason: HOSPADM

## 2023-11-25 RX ORDER — DEXTROSE MONOHYDRATE 100 MG/ML
INJECTION, SOLUTION INTRAVENOUS CONTINUOUS PRN
Status: DISCONTINUED | OUTPATIENT
Start: 2023-11-25 | End: 2023-11-28 | Stop reason: HOSPADM

## 2023-11-25 RX ADMIN — SODIUM CHLORIDE, PRESERVATIVE FREE 10 ML: 5 INJECTION INTRAVENOUS at 21:43

## 2023-11-25 RX ADMIN — SUCRALFATE 1 G: 1 TABLET ORAL at 16:28

## 2023-11-25 RX ADMIN — HYDROMORPHONE HYDROCHLORIDE 1 MG: 1 INJECTION, SOLUTION INTRAMUSCULAR; INTRAVENOUS; SUBCUTANEOUS at 03:11

## 2023-11-25 RX ADMIN — ALUMINUM HYDROXIDE, MAGNESIUM HYDROXIDE, AND SIMETHICONE 30 ML: 200; 200; 20 SUSPENSION ORAL at 03:09

## 2023-11-25 RX ADMIN — HEPARIN SODIUM 5000 UNITS: 5000 INJECTION INTRAVENOUS; SUBCUTANEOUS at 06:15

## 2023-11-25 RX ADMIN — ALPRAZOLAM 1 MG: 0.5 TABLET ORAL at 21:28

## 2023-11-25 RX ADMIN — SUCRALFATE 1 G: 1 TABLET ORAL at 08:50

## 2023-11-25 RX ADMIN — SODIUM CHLORIDE, POTASSIUM CHLORIDE, SODIUM LACTATE AND CALCIUM CHLORIDE: 600; 310; 30; 20 INJECTION, SOLUTION INTRAVENOUS at 06:26

## 2023-11-25 RX ADMIN — PANTOPRAZOLE SODIUM 40 MG: 40 INJECTION, POWDER, FOR SOLUTION INTRAVENOUS at 08:50

## 2023-11-25 RX ADMIN — HYDROMORPHONE HYDROCHLORIDE 1 MG: 1 INJECTION, SOLUTION INTRAMUSCULAR; INTRAVENOUS; SUBCUTANEOUS at 21:27

## 2023-11-25 RX ADMIN — HYDROMORPHONE HYDROCHLORIDE 1 MG: 1 INJECTION, SOLUTION INTRAMUSCULAR; INTRAVENOUS; SUBCUTANEOUS at 07:53

## 2023-11-25 RX ADMIN — ATORVASTATIN CALCIUM 80 MG: 40 TABLET, FILM COATED ORAL at 21:16

## 2023-11-25 RX ADMIN — CYANOCOBALAMIN TAB 1000 MCG 1000 MCG: 1000 TAB at 08:50

## 2023-11-25 RX ADMIN — SODIUM CHLORIDE, PRESERVATIVE FREE 10 ML: 5 INJECTION INTRAVENOUS at 08:51

## 2023-11-25 RX ADMIN — PANTOPRAZOLE SODIUM 40 MG: 40 INJECTION, POWDER, FOR SOLUTION INTRAVENOUS at 21:19

## 2023-11-25 RX ADMIN — ACETAMINOPHEN 650 MG: 325 TABLET ORAL at 21:16

## 2023-11-25 RX ADMIN — DIATRIZOATE MEGLUMINE AND DIATRIZOATE SODIUM 15 ML: 660; 100 LIQUID ORAL; RECTAL at 07:46

## 2023-11-25 RX ADMIN — SODIUM CHLORIDE, POTASSIUM CHLORIDE, SODIUM LACTATE AND CALCIUM CHLORIDE: 600; 310; 30; 20 INJECTION, SOLUTION INTRAVENOUS at 18:21

## 2023-11-25 RX ADMIN — IOPAMIDOL 100 ML: 755 INJECTION, SOLUTION INTRAVENOUS at 09:39

## 2023-11-25 RX ADMIN — SUCRALFATE 1 G: 1 TABLET ORAL at 21:16

## 2023-11-25 RX ADMIN — HYDROMORPHONE HYDROCHLORIDE 1 MG: 1 INJECTION, SOLUTION INTRAMUSCULAR; INTRAVENOUS; SUBCUTANEOUS at 11:57

## 2023-11-25 RX ADMIN — FOLIC ACID 1 MG: 1 TABLET ORAL at 08:49

## 2023-11-25 RX ADMIN — TORSEMIDE 20 MG: 20 TABLET ORAL at 08:49

## 2023-11-25 RX ADMIN — HEPARIN SODIUM 5000 UNITS: 5000 INJECTION INTRAVENOUS; SUBCUTANEOUS at 14:04

## 2023-11-25 RX ADMIN — SUCRALFATE 1 G: 1 TABLET ORAL at 11:57

## 2023-11-25 RX ADMIN — SODIUM CHLORIDE, POTASSIUM CHLORIDE, SODIUM LACTATE AND CALCIUM CHLORIDE: 600; 310; 30; 20 INJECTION, SOLUTION INTRAVENOUS at 00:30

## 2023-11-25 RX ADMIN — B-COMPLEX W/ C & FOLIC ACID TAB 1 TABLET: TAB at 08:50

## 2023-11-25 RX ADMIN — ACETAMINOPHEN 650 MG: 325 TABLET ORAL at 14:04

## 2023-11-25 RX ADMIN — THIAMINE HYDROCHLORIDE 100 MG: 100 INJECTION, SOLUTION INTRAMUSCULAR; INTRAVENOUS at 08:50

## 2023-11-25 RX ADMIN — HEPARIN SODIUM 5000 UNITS: 5000 INJECTION INTRAVENOUS; SUBCUTANEOUS at 21:19

## 2023-11-25 RX ADMIN — METOPROLOL SUCCINATE 200 MG: 100 TABLET, EXTENDED RELEASE ORAL at 08:49

## 2023-11-25 RX ADMIN — ACETAMINOPHEN 650 MG: 325 TABLET ORAL at 08:49

## 2023-11-25 RX ADMIN — HYDROMORPHONE HYDROCHLORIDE 1 MG: 1 INJECTION, SOLUTION INTRAMUSCULAR; INTRAVENOUS; SUBCUTANEOUS at 18:17

## 2023-11-25 RX ADMIN — SODIUM CHLORIDE, POTASSIUM CHLORIDE, SODIUM LACTATE AND CALCIUM CHLORIDE: 600; 310; 30; 20 INJECTION, SOLUTION INTRAVENOUS at 11:59

## 2023-11-25 ASSESSMENT — PAIN DESCRIPTION - DESCRIPTORS
DESCRIPTORS: BURNING
DESCRIPTORS: BURNING
DESCRIPTORS: SHOOTING
DESCRIPTORS: SHOOTING
DESCRIPTORS: SHOOTING;BURNING

## 2023-11-25 ASSESSMENT — PAIN DESCRIPTION - LOCATION
LOCATION: ABDOMEN
LOCATION: ABDOMEN;CHEST
LOCATION: ABDOMEN

## 2023-11-25 ASSESSMENT — PAIN DESCRIPTION - ORIENTATION
ORIENTATION: ANTERIOR
ORIENTATION: ANTERIOR

## 2023-11-25 ASSESSMENT — PAIN SCALES - GENERAL
PAINLEVEL_OUTOF10: 4
PAINLEVEL_OUTOF10: 3
PAINLEVEL_OUTOF10: 2
PAINLEVEL_OUTOF10: 3
PAINLEVEL_OUTOF10: 10
PAINLEVEL_OUTOF10: 7
PAINLEVEL_OUTOF10: 8
PAINLEVEL_OUTOF10: 8
PAINLEVEL_OUTOF10: 3
PAINLEVEL_OUTOF10: 7
PAINLEVEL_OUTOF10: 7
PAINLEVEL_OUTOF10: 3

## 2023-11-25 NOTE — THERAPY EVALUATION
OCCUPATIONAL THERAPY Initial Assessment, Discharge, and AM       OT Visit Days: 1  Acknowledge Orders  Time  OT Charge Capture  Rehab Caseload Tracker      Germain Mujica is a 76 y.o. male   PRIMARY DIAGNOSIS: Acute pancreatitis without infection or necrosis, unspecified pancreatitis type  Abdominal pain, epigastric [R10.13]  Acute pancreatitis, unspecified complication status, unspecified pancreatitis type [K85.90]  Acute pancreatitis without infection or necrosis, unspecified pancreatitis type [K85.90]       Reason for Referral: Difficulty in walking, Not elsewhere classified (R26.2)  Other abnormalities of gait and mobility (R26.89)  Inpatient: Payor: [employer] MEDICARE / Plan: [employer] GOLD PLUS HMO / Product Type: *No Product type* /     ASSESSMENT:     REHAB RECOMMENDATIONS:   Recommendation to date pending progress:  Setting:  No further skilled occupational therapy after discharge from hospital    Equipment:    None     ASSESSMENT:  Mr. Cristiane Ruiz is a 76 y.o. male who presents to the hospital with severe upper abdominal pain radiating into chest and back. PMHx of CKD3b, former smoker, DM2, HHTN, CAD, acquired hypertrophic pyloric stenosis, esophageal dysphagia, GERD with esophageal stricture. Today, pt is received ambulating in room, agreeable to participate in the session. Denies SOB, dizziness, and pain throughout the session. Per patient report, he lives with his wife. He is independent with all ADLs/IADLs without assistive at baseline. Pt presents with good seated balance EOB requiring distant supervision. Pt able to don B shoes utilizing figure-four position with independence/supervision seated EOB. Pt performed sit<>stand transfers and functional mobility for ADLs with independence/supervision. Pt positioned comfortably up in chair at end of session with alarm on and all needs within reach. RN notified of pt's performance.     Pt presents as functioning at approximate baseline performance with no

## 2023-11-25 NOTE — CARE COORDINATION
CM reviewed patient medical chart for discharge planning. CM completed CMA. Patient alert/orient x4. Patient verified demographic, no changes needed. Patient provided physical address: [address] [place] [address] Dr. Nalini Babin.  Verified PCP (Dr. Otoniel Ramirez) was last seen in office on 11/13/23 and insurance [facility], [facility]). Patient states he lives with spouse in mobile home with four steps to enter into the home. Patient states he's independent with his ADL's and iADL's and has no DME's in the home. Patient states he's retired and independent with driving himself to appointments. Patient acknowledged that he has had [facility] in the last.  States it has been five years or more and unsure of agency. Patient has no consults placed at this time. Patient has no needs identified at this time. CM will continue to follow and remain available for any needs, concerns or questions that may arise. 11/25/23 1524   Service Assessment   Patient Orientation Alert and Oriented;Person;Place;Situation;Self   Cognition Alert   History Provided By Patient;Medical Record   Primary Caregiver Self   Support Systems Spouse/Significant Other   Patient's Healthcare Decision Maker is: Legal Next of Kin   PCP Verified by CM Yes   Last Visit to PCP Within last 3 months   Prior Functional Level Independent in ADLs/IADLs   Current Functional Level Independent in ADLs/IADLs   Can patient return to prior living arrangement Yes   Ability to make needs known: Good   Family able to assist with home care needs: Yes   Would you like for me to discuss the discharge plan with any other family members/significant others, and if so, who? Yes   Financial Resources Medicare   Community Resources None   Social/Functional History   Lives With Spouse   Type of Home Mobile home   Home Layout One level   Home Access Stairs to enter without rails   Entrance Stairs - Number of Steps 4   Bathroom Shower/Tub Walk-in shower; Tub/Shower unit   [employer]

## 2023-11-25 NOTE — PLAN OF CARE
Problem: Discharge Planning  Goal: Discharge to home or other facility with appropriate resources  11/25/2023 1429 by Joseluis Miller RN  Outcome: Progressing  11/25/2023 0102 by Raul Serna  Outcome: Progressing     Problem: Pain  Goal: Verbalizes/displays adequate comfort level or baseline comfort level  11/25/2023 1429 by Joseluis Miller RN  Outcome: Progressing  11/25/2023 0102 by Raul Serna  Outcome: Progressing     Problem: Safety - Adult  Goal: Free from fall injury  11/25/2023 1429 by Rolanda Sanchez RN  Outcome: Progressing  11/25/2023 0102 by Raul Serna  Outcome: Progressing

## 2023-11-25 NOTE — FLOWSHEET NOTE
11/25/23 0815   Handoff   Communication Given Shift Handoff   Handoff Received From [facility] AT [place] Communication Face to Face   Time Handoff Given 0700

## 2023-11-26 LAB
ALBUMIN SERPL-MCNC: 3.6 G/DL (ref 3.2–4.6)
ALBUMIN/GLOB SERPL: 0.8 (ref 0.4–1.6)
ALP SERPL-CCNC: 33 U/L (ref 50–136)
ALT SERPL-CCNC: 20 U/L (ref 12–65)
ANION GAP SERPL CALC-SCNC: 9 MMOL/L (ref 2–11)
APTT PPP: 35.3 SEC (ref 24.5–34.2)
AST SERPL-CCNC: 23 U/L (ref 15–37)
BASOPHILS # BLD: 0.1 K/UL (ref 0–0.2)
BASOPHILS NFR BLD: 1 % (ref 0–2)
BILIRUB SERPL-MCNC: 0.5 MG/DL (ref 0.2–1.1)
BUN SERPL-MCNC: 17 MG/DL (ref 8–23)
CALCIUM SERPL-MCNC: 10.7 MG/DL (ref 8.3–10.4)
CHLORIDE SERPL-SCNC: 105 MMOL/L (ref 101–110)
CO2 SERPL-SCNC: 26 MMOL/L (ref 21–32)
CREAT SERPL-MCNC: 1.4 MG/DL (ref 0.8–1.5)
DIFFERENTIAL METHOD BLD: ABNORMAL
EOSINOPHIL # BLD: 0.5 K/UL (ref 0–0.8)
EOSINOPHIL NFR BLD: 6 % (ref 0.5–7.8)
ERYTHROCYTE [DISTWIDTH] IN BLOOD BY AUTOMATED COUNT: 14 % (ref 11.9–14.6)
GLOBULIN SER CALC-MCNC: 4.4 G/DL (ref 2.8–4.5)
GLUCOSE BLD STRIP.AUTO-MCNC: 107 MG/DL (ref 65–100)
GLUCOSE BLD STRIP.AUTO-MCNC: 89 MG/DL (ref 65–100)
GLUCOSE BLD STRIP.AUTO-MCNC: 90 MG/DL (ref 65–100)
GLUCOSE BLD STRIP.AUTO-MCNC: 92 MG/DL (ref 65–100)
GLUCOSE BLD STRIP.AUTO-MCNC: 93 MG/DL (ref 65–100)
GLUCOSE SERPL-MCNC: 91 MG/DL (ref 65–100)
HCT VFR BLD AUTO: 36.6 % (ref 41.1–50.3)
HGB BLD-MCNC: 11.7 G/DL (ref 13.6–17.2)
IMM GRANULOCYTES # BLD AUTO: 0 K/UL (ref 0–0.5)
IMM GRANULOCYTES NFR BLD AUTO: 0 % (ref 0–5)
INR PPP: 1
LYMPHOCYTES # BLD: 1.7 K/UL (ref 0.5–4.6)
LYMPHOCYTES NFR BLD: 20 % (ref 13–44)
MAGNESIUM SERPL-MCNC: 1.8 MG/DL (ref 1.8–2.4)
MCH RBC QN AUTO: 30.1 PG (ref 26.1–32.9)
MCHC RBC AUTO-ENTMCNC: 32 G/DL (ref 31.4–35)
MCV RBC AUTO: 94.1 FL (ref 82–102)
MONOCYTES # BLD: 0.6 K/UL (ref 0.1–1.3)
MONOCYTES NFR BLD: 7 % (ref 4–12)
NEUTS SEG # BLD: 5.6 K/UL (ref 1.7–8.2)
NEUTS SEG NFR BLD: 66 % (ref 43–78)
NRBC # BLD: 0 K/UL (ref 0–0.2)
PHOSPHATE SERPL-MCNC: 3 MG/DL (ref 2.3–3.7)
PLATELET # BLD AUTO: 284 K/UL (ref 150–450)
PMV BLD AUTO: 10 FL (ref 9.4–12.3)
POTASSIUM SERPL-SCNC: 3.6 MMOL/L (ref 3.5–5.1)
PROT SERPL-MCNC: 8 G/DL (ref 6.3–8.2)
PROTHROMBIN TIME: 13.9 SEC (ref 12.6–14.3)
RBC # BLD AUTO: 3.89 M/UL (ref 4.23–5.6)
SERVICE CMNT-IMP: ABNORMAL
SERVICE CMNT-IMP: NORMAL
SODIUM SERPL-SCNC: 140 MMOL/L (ref 133–143)
UFH PPP CHRO-ACNC: 0.8 IU/ML (ref 0.3–0.7)
UFH PPP CHRO-ACNC: 0.89 IU/ML (ref 0.3–0.7)
UFH PPP CHRO-ACNC: <0.1 IU/ML (ref 0.3–0.7)
WBC # BLD AUTO: 8.5 K/UL (ref 4.3–11.1)

## 2023-11-26 PROCEDURE — 6360000002 HC RX W HCPCS: Performed by: FAMILY MEDICINE

## 2023-11-26 PROCEDURE — 85025 COMPLETE CBC W/AUTO DIFF WBC: CPT

## 2023-11-26 PROCEDURE — 1100000000 HC RM PRIVATE

## 2023-11-26 PROCEDURE — 36415 COLL VENOUS BLD VENIPUNCTURE: CPT

## 2023-11-26 PROCEDURE — 6360000002 HC RX W HCPCS: Performed by: INTERNAL MEDICINE

## 2023-11-26 PROCEDURE — 82962 GLUCOSE BLOOD TEST: CPT

## 2023-11-26 PROCEDURE — 83735 ASSAY OF MAGNESIUM: CPT

## 2023-11-26 PROCEDURE — 6370000000 HC RX 637 (ALT 250 FOR IP): Performed by: FAMILY MEDICINE

## 2023-11-26 PROCEDURE — 85730 THROMBOPLASTIN TIME PARTIAL: CPT

## 2023-11-26 PROCEDURE — A4216 STERILE WATER/SALINE, 10 ML: HCPCS | Performed by: FAMILY MEDICINE

## 2023-11-26 PROCEDURE — 84100 ASSAY OF PHOSPHORUS: CPT

## 2023-11-26 PROCEDURE — 80053 COMPREHEN METABOLIC PANEL: CPT

## 2023-11-26 PROCEDURE — 85610 PROTHROMBIN TIME: CPT

## 2023-11-26 PROCEDURE — 76937 US GUIDE VASCULAR ACCESS: CPT

## 2023-11-26 PROCEDURE — 99221 1ST HOSP IP/OBS SF/LOW 40: CPT | Performed by: INTERNAL MEDICINE

## 2023-11-26 PROCEDURE — C9113 INJ PANTOPRAZOLE SODIUM, VIA: HCPCS | Performed by: FAMILY MEDICINE

## 2023-11-26 PROCEDURE — 85520 HEPARIN ASSAY: CPT

## 2023-11-26 PROCEDURE — 2500000003 HC RX 250 WO HCPCS: Performed by: FAMILY MEDICINE

## 2023-11-26 PROCEDURE — 2580000003 HC RX 258: Performed by: FAMILY MEDICINE

## 2023-11-26 RX ORDER — HEPARIN SODIUM 1000 [USP'U]/ML
80 INJECTION, SOLUTION INTRAVENOUS; SUBCUTANEOUS PRN
Status: DISCONTINUED | OUTPATIENT
Start: 2023-11-26 | End: 2023-11-28 | Stop reason: ALTCHOICE

## 2023-11-26 RX ORDER — HEPARIN SODIUM 1000 [USP'U]/ML
80 INJECTION, SOLUTION INTRAVENOUS; SUBCUTANEOUS ONCE
Status: COMPLETED | OUTPATIENT
Start: 2023-11-26 | End: 2023-11-26

## 2023-11-26 RX ORDER — HEPARIN SODIUM 1000 [USP'U]/ML
40 INJECTION, SOLUTION INTRAVENOUS; SUBCUTANEOUS PRN
Status: DISCONTINUED | OUTPATIENT
Start: 2023-11-26 | End: 2023-11-28 | Stop reason: ALTCHOICE

## 2023-11-26 RX ORDER — HEPARIN SODIUM 10000 [USP'U]/100ML
5-30 INJECTION, SOLUTION INTRAVENOUS CONTINUOUS
Status: DISCONTINUED | OUTPATIENT
Start: 2023-11-26 | End: 2023-11-28

## 2023-11-26 RX ADMIN — HEPARIN SODIUM 8670 UNITS: 1000 INJECTION INTRAVENOUS; SUBCUTANEOUS at 09:42

## 2023-11-26 RX ADMIN — SODIUM CHLORIDE, POTASSIUM CHLORIDE, SODIUM LACTATE AND CALCIUM CHLORIDE: 600; 310; 30; 20 INJECTION, SOLUTION INTRAVENOUS at 18:27

## 2023-11-26 RX ADMIN — THIAMINE HYDROCHLORIDE 100 MG: 100 INJECTION, SOLUTION INTRAMUSCULAR; INTRAVENOUS at 08:03

## 2023-11-26 RX ADMIN — ALPRAZOLAM 1 MG: 0.5 TABLET ORAL at 22:54

## 2023-11-26 RX ADMIN — SODIUM CHLORIDE, POTASSIUM CHLORIDE, SODIUM LACTATE AND CALCIUM CHLORIDE: 600; 310; 30; 20 INJECTION, SOLUTION INTRAVENOUS at 08:10

## 2023-11-26 RX ADMIN — HEPARIN SODIUM 18 UNITS/KG/HR: 10000 INJECTION, SOLUTION INTRAVENOUS at 09:47

## 2023-11-26 RX ADMIN — PANTOPRAZOLE SODIUM 40 MG: 40 INJECTION, POWDER, FOR SOLUTION INTRAVENOUS at 06:22

## 2023-11-26 RX ADMIN — HYDROMORPHONE HYDROCHLORIDE 0.5 MG: 1 INJECTION, SOLUTION INTRAMUSCULAR; INTRAVENOUS; SUBCUTANEOUS at 15:28

## 2023-11-26 RX ADMIN — SODIUM CHLORIDE, POTASSIUM CHLORIDE, SODIUM LACTATE AND CALCIUM CHLORIDE: 600; 310; 30; 20 INJECTION, SOLUTION INTRAVENOUS at 14:25

## 2023-11-26 RX ADMIN — SODIUM CHLORIDE, POTASSIUM CHLORIDE, SODIUM LACTATE AND CALCIUM CHLORIDE: 600; 310; 30; 20 INJECTION, SOLUTION INTRAVENOUS at 02:41

## 2023-11-26 RX ADMIN — CYANOCOBALAMIN TAB 1000 MCG 1000 MCG: 1000 TAB at 08:02

## 2023-11-26 RX ADMIN — SUCRALFATE 1 G: 1 TABLET ORAL at 12:29

## 2023-11-26 RX ADMIN — SUCRALFATE 1 G: 1 TABLET ORAL at 08:03

## 2023-11-26 RX ADMIN — HYDROMORPHONE HYDROCHLORIDE 0.5 MG: 1 INJECTION, SOLUTION INTRAMUSCULAR; INTRAVENOUS; SUBCUTANEOUS at 18:25

## 2023-11-26 RX ADMIN — METOPROLOL SUCCINATE 200 MG: 100 TABLET, EXTENDED RELEASE ORAL at 08:03

## 2023-11-26 RX ADMIN — SUCRALFATE 1 G: 1 TABLET ORAL at 21:06

## 2023-11-26 RX ADMIN — HYDROMORPHONE HYDROCHLORIDE 0.5 MG: 1 INJECTION, SOLUTION INTRAMUSCULAR; INTRAVENOUS; SUBCUTANEOUS at 08:02

## 2023-11-26 RX ADMIN — HYDROMORPHONE HYDROCHLORIDE 1 MG: 1 INJECTION, SOLUTION INTRAMUSCULAR; INTRAVENOUS; SUBCUTANEOUS at 04:34

## 2023-11-26 RX ADMIN — PANTOPRAZOLE SODIUM 40 MG: 40 INJECTION, POWDER, FOR SOLUTION INTRAVENOUS at 18:25

## 2023-11-26 RX ADMIN — HYDROMORPHONE HYDROCHLORIDE 1 MG: 1 INJECTION, SOLUTION INTRAMUSCULAR; INTRAVENOUS; SUBCUTANEOUS at 00:38

## 2023-11-26 RX ADMIN — FOLIC ACID 1 MG: 1 TABLET ORAL at 08:03

## 2023-11-26 RX ADMIN — HYDROMORPHONE HYDROCHLORIDE 1 MG: 1 INJECTION, SOLUTION INTRAMUSCULAR; INTRAVENOUS; SUBCUTANEOUS at 21:06

## 2023-11-26 RX ADMIN — SODIUM CHLORIDE, PRESERVATIVE FREE 10 ML: 5 INJECTION INTRAVENOUS at 21:03

## 2023-11-26 RX ADMIN — SUCRALFATE 1 G: 1 TABLET ORAL at 15:29

## 2023-11-26 RX ADMIN — HYDROMORPHONE HYDROCHLORIDE 0.5 MG: 1 INJECTION, SOLUTION INTRAMUSCULAR; INTRAVENOUS; SUBCUTANEOUS at 12:28

## 2023-11-26 RX ADMIN — B-COMPLEX W/ C & FOLIC ACID TAB 1 TABLET: TAB at 08:03

## 2023-11-26 RX ADMIN — HEPARIN SODIUM 16 UNITS/KG/HR: 10000 INJECTION, SOLUTION INTRAVENOUS at 23:22

## 2023-11-26 RX ADMIN — HEPARIN SODIUM 5000 UNITS: 5000 INJECTION INTRAVENOUS; SUBCUTANEOUS at 06:22

## 2023-11-26 RX ADMIN — ATORVASTATIN CALCIUM 80 MG: 40 TABLET, FILM COATED ORAL at 21:06

## 2023-11-26 ASSESSMENT — PAIN DESCRIPTION - LOCATION
LOCATION: ABDOMEN
LOCATION: ABDOMEN;CHEST
LOCATION: ABDOMEN
LOCATION: ABDOMEN

## 2023-11-26 ASSESSMENT — PAIN DESCRIPTION - DESCRIPTORS: DESCRIPTORS: BURNING

## 2023-11-26 ASSESSMENT — PAIN SCALES - GENERAL
PAINLEVEL_OUTOF10: 4
PAINLEVEL_OUTOF10: 1
PAINLEVEL_OUTOF10: 10
PAINLEVEL_OUTOF10: 5
PAINLEVEL_OUTOF10: 8
PAINLEVEL_OUTOF10: 4
PAINLEVEL_OUTOF10: 5
PAINLEVEL_OUTOF10: 7
PAINLEVEL_OUTOF10: 1
PAINLEVEL_OUTOF10: 8
PAINLEVEL_OUTOF10: 8

## 2023-11-26 ASSESSMENT — PAIN - FUNCTIONAL ASSESSMENT: PAIN_FUNCTIONAL_ASSESSMENT: PREVENTS OR INTERFERES SOME ACTIVE ACTIVITIES AND ADLS

## 2023-11-26 ASSESSMENT — PAIN DESCRIPTION - ORIENTATION: ORIENTATION: UPPER

## 2023-11-27 PROBLEM — Z79.01 ANTICOAGULATED: Status: ACTIVE | Noted: 2023-11-27

## 2023-11-27 PROBLEM — K85.90 ACUTE PANCREATITIS: Status: ACTIVE | Noted: 2023-11-27

## 2023-11-27 PROBLEM — R10.13 ABDOMINAL PAIN, EPIGASTRIC: Status: ACTIVE | Noted: 2023-11-27

## 2023-11-27 PROBLEM — K55.069 MESENTERIC VEIN THROMBOSIS (HCC): Status: ACTIVE | Noted: 2023-11-27

## 2023-11-27 LAB
ALBUMIN SERPL-MCNC: 3.1 G/DL (ref 3.2–4.6)
ALBUMIN/GLOB SERPL: 0.8 (ref 0.4–1.6)
ALP SERPL-CCNC: 28 U/L (ref 50–136)
ALT SERPL-CCNC: 20 U/L (ref 12–65)
ANION GAP SERPL CALC-SCNC: 5 MMOL/L (ref 2–11)
AST SERPL-CCNC: 39 U/L (ref 15–37)
BASOPHILS # BLD: 0 K/UL (ref 0–0.2)
BASOPHILS NFR BLD: 1 % (ref 0–2)
BILIRUB SERPL-MCNC: 0.5 MG/DL (ref 0.2–1.1)
BUN SERPL-MCNC: 13 MG/DL (ref 8–23)
CALCIUM SERPL-MCNC: 10.1 MG/DL (ref 8.3–10.4)
CHLORIDE SERPL-SCNC: 107 MMOL/L (ref 101–110)
CO2 SERPL-SCNC: 29 MMOL/L (ref 21–32)
CREAT SERPL-MCNC: 1.2 MG/DL (ref 0.8–1.5)
DIFFERENTIAL METHOD BLD: ABNORMAL
EOSINOPHIL # BLD: 0.5 K/UL (ref 0–0.8)
EOSINOPHIL NFR BLD: 7 % (ref 0.5–7.8)
ERYTHROCYTE [DISTWIDTH] IN BLOOD BY AUTOMATED COUNT: 13.9 % (ref 11.9–14.6)
GLOBULIN SER CALC-MCNC: 4.1 G/DL (ref 2.8–4.5)
GLUCOSE BLD STRIP.AUTO-MCNC: 104 MG/DL (ref 65–100)
GLUCOSE BLD STRIP.AUTO-MCNC: 104 MG/DL (ref 65–100)
GLUCOSE BLD STRIP.AUTO-MCNC: 86 MG/DL (ref 65–100)
GLUCOSE BLD STRIP.AUTO-MCNC: 91 MG/DL (ref 65–100)
GLUCOSE SERPL-MCNC: 91 MG/DL (ref 65–100)
HCT VFR BLD AUTO: 32.5 % (ref 41.1–50.3)
HGB BLD-MCNC: 10.4 G/DL (ref 13.6–17.2)
IGG4 SER-MCNC: 76 MG/DL (ref 2–96)
IMM GRANULOCYTES # BLD AUTO: 0 K/UL (ref 0–0.5)
IMM GRANULOCYTES NFR BLD AUTO: 0 % (ref 0–5)
LYMPHOCYTES # BLD: 2.1 K/UL (ref 0.5–4.6)
LYMPHOCYTES NFR BLD: 29 % (ref 13–44)
MAGNESIUM SERPL-MCNC: 1.9 MG/DL (ref 1.8–2.4)
MCH RBC QN AUTO: 30 PG (ref 26.1–32.9)
MCHC RBC AUTO-ENTMCNC: 32 G/DL (ref 31.4–35)
MCV RBC AUTO: 93.7 FL (ref 82–102)
MONOCYTES # BLD: 0.5 K/UL (ref 0.1–1.3)
MONOCYTES NFR BLD: 7 % (ref 4–12)
NEUTS SEG # BLD: 4.1 K/UL (ref 1.7–8.2)
NEUTS SEG NFR BLD: 56 % (ref 43–78)
NRBC # BLD: 0 K/UL (ref 0–0.2)
PHOSPHATE SERPL-MCNC: 3.3 MG/DL (ref 2.3–3.7)
PLATELET # BLD AUTO: 257 K/UL (ref 150–450)
PMV BLD AUTO: 10.3 FL (ref 9.4–12.3)
POTASSIUM SERPL-SCNC: 3.8 MMOL/L (ref 3.5–5.1)
PROT SERPL-MCNC: 7.2 G/DL (ref 6.3–8.2)
RBC # BLD AUTO: 3.47 M/UL (ref 4.23–5.6)
SERVICE CMNT-IMP: ABNORMAL
SERVICE CMNT-IMP: ABNORMAL
SERVICE CMNT-IMP: NORMAL
SERVICE CMNT-IMP: NORMAL
SODIUM SERPL-SCNC: 141 MMOL/L (ref 133–143)
UFH PPP CHRO-ACNC: 0.6 IU/ML (ref 0.3–0.7)
UFH PPP CHRO-ACNC: 0.65 IU/ML (ref 0.3–0.7)
WBC # BLD AUTO: 7.4 K/UL (ref 4.3–11.1)

## 2023-11-27 PROCEDURE — 82962 GLUCOSE BLOOD TEST: CPT

## 2023-11-27 PROCEDURE — APPSS60 APP SPLIT SHARED TIME 46-60 MINUTES: Performed by: NURSE PRACTITIONER

## 2023-11-27 PROCEDURE — 6370000000 HC RX 637 (ALT 250 FOR IP): Performed by: FAMILY MEDICINE

## 2023-11-27 PROCEDURE — 2500000003 HC RX 250 WO HCPCS: Performed by: FAMILY MEDICINE

## 2023-11-27 PROCEDURE — 85025 COMPLETE CBC W/AUTO DIFF WBC: CPT

## 2023-11-27 PROCEDURE — 6360000002 HC RX W HCPCS: Performed by: FAMILY MEDICINE

## 2023-11-27 PROCEDURE — A4216 STERILE WATER/SALINE, 10 ML: HCPCS | Performed by: FAMILY MEDICINE

## 2023-11-27 PROCEDURE — 83735 ASSAY OF MAGNESIUM: CPT

## 2023-11-27 PROCEDURE — 1100000000 HC RM PRIVATE

## 2023-11-27 PROCEDURE — 36415 COLL VENOUS BLD VENIPUNCTURE: CPT

## 2023-11-27 PROCEDURE — C9113 INJ PANTOPRAZOLE SODIUM, VIA: HCPCS | Performed by: FAMILY MEDICINE

## 2023-11-27 PROCEDURE — 84100 ASSAY OF PHOSPHORUS: CPT

## 2023-11-27 PROCEDURE — 99223 1ST HOSP IP/OBS HIGH 75: CPT | Performed by: INTERNAL MEDICINE

## 2023-11-27 PROCEDURE — 6360000002 HC RX W HCPCS: Performed by: INTERNAL MEDICINE

## 2023-11-27 PROCEDURE — 2580000003 HC RX 258: Performed by: FAMILY MEDICINE

## 2023-11-27 PROCEDURE — 85520 HEPARIN ASSAY: CPT

## 2023-11-27 PROCEDURE — 80053 COMPREHEN METABOLIC PANEL: CPT

## 2023-11-27 RX ADMIN — SODIUM CHLORIDE, POTASSIUM CHLORIDE, SODIUM LACTATE AND CALCIUM CHLORIDE: 600; 310; 30; 20 INJECTION, SOLUTION INTRAVENOUS at 01:16

## 2023-11-27 RX ADMIN — ATORVASTATIN CALCIUM 80 MG: 40 TABLET, FILM COATED ORAL at 21:13

## 2023-11-27 RX ADMIN — PANTOPRAZOLE SODIUM 40 MG: 40 INJECTION, POWDER, FOR SOLUTION INTRAVENOUS at 06:06

## 2023-11-27 RX ADMIN — SUCRALFATE 1 G: 1 TABLET ORAL at 21:13

## 2023-11-27 RX ADMIN — SODIUM CHLORIDE, POTASSIUM CHLORIDE, SODIUM LACTATE AND CALCIUM CHLORIDE: 600; 310; 30; 20 INJECTION, SOLUTION INTRAVENOUS at 14:57

## 2023-11-27 RX ADMIN — HYDROMORPHONE HYDROCHLORIDE 1 MG: 1 INJECTION, SOLUTION INTRAMUSCULAR; INTRAVENOUS; SUBCUTANEOUS at 01:18

## 2023-11-27 RX ADMIN — ACETAMINOPHEN 650 MG: 325 TABLET ORAL at 15:01

## 2023-11-27 RX ADMIN — SODIUM CHLORIDE, POTASSIUM CHLORIDE, SODIUM LACTATE AND CALCIUM CHLORIDE: 600; 310; 30; 20 INJECTION, SOLUTION INTRAVENOUS at 09:03

## 2023-11-27 RX ADMIN — SUCRALFATE 1 G: 1 TABLET ORAL at 13:11

## 2023-11-27 RX ADMIN — HEPARIN SODIUM 15 UNITS/KG/HR: 10000 INJECTION, SOLUTION INTRAVENOUS at 14:52

## 2023-11-27 RX ADMIN — SODIUM CHLORIDE, PRESERVATIVE FREE 10 ML: 5 INJECTION INTRAVENOUS at 09:01

## 2023-11-27 RX ADMIN — SODIUM CHLORIDE, PRESERVATIVE FREE 5 ML: 5 INJECTION INTRAVENOUS at 21:14

## 2023-11-27 RX ADMIN — SODIUM CHLORIDE, POTASSIUM CHLORIDE, SODIUM LACTATE AND CALCIUM CHLORIDE: 600; 310; 30; 20 INJECTION, SOLUTION INTRAVENOUS at 21:36

## 2023-11-27 RX ADMIN — SUCRALFATE 1 G: 1 TABLET ORAL at 08:59

## 2023-11-27 RX ADMIN — SUCRALFATE 1 G: 1 TABLET ORAL at 16:59

## 2023-11-27 RX ADMIN — ALPRAZOLAM 1 MG: 0.5 TABLET ORAL at 22:27

## 2023-11-27 RX ADMIN — HYDROMORPHONE HYDROCHLORIDE 1 MG: 1 INJECTION, SOLUTION INTRAMUSCULAR; INTRAVENOUS; SUBCUTANEOUS at 21:13

## 2023-11-27 RX ADMIN — METOPROLOL SUCCINATE 200 MG: 100 TABLET, EXTENDED RELEASE ORAL at 09:00

## 2023-11-27 RX ADMIN — HYDROMORPHONE HYDROCHLORIDE 1 MG: 1 INJECTION, SOLUTION INTRAMUSCULAR; INTRAVENOUS; SUBCUTANEOUS at 05:29

## 2023-11-27 RX ADMIN — PANTOPRAZOLE SODIUM 40 MG: 40 INJECTION, POWDER, FOR SOLUTION INTRAVENOUS at 17:44

## 2023-11-27 ASSESSMENT — PAIN DESCRIPTION - ORIENTATION
ORIENTATION: MID
ORIENTATION: MID

## 2023-11-27 ASSESSMENT — PAIN DESCRIPTION - LOCATION
LOCATION: THROAT
LOCATION: CHEST
LOCATION: CHEST

## 2023-11-27 ASSESSMENT — PAIN SCALES - GENERAL
PAINLEVEL_OUTOF10: 9
PAINLEVEL_OUTOF10: 5
PAINLEVEL_OUTOF10: 8
PAINLEVEL_OUTOF10: 7
PAINLEVEL_OUTOF10: 8
PAINLEVEL_OUTOF10: 0

## 2023-11-27 ASSESSMENT — PAIN DESCRIPTION - DESCRIPTORS: DESCRIPTORS: BURNING;THROBBING

## 2023-11-27 NOTE — CONSULTS
[facility] Hematology & Oncology        Inpatient Hematology / Oncology Consult    Reason for Consult:  Abdominal pain, epigastric [R10.13]  Acute pancreatitis, unspecified complication status, unspecified pancreatitis type [K85.90]  Acute pancreatitis without infection or necrosis, unspecified pancreatitis type [K85.90]  Referring Physician:  Sofya Dawson MD    History of Present Illness:  Mr. Shelia Welch is a 76 y.o. male admitted on 11/24/2023. The primary encounter diagnosis was Acute pancreatitis, unspecified complication status, unspecified pancreatitis type. A diagnosis of Abdominal pain, epigastric was also pertinent to this visit. Norberto Number His PMH includes obesity, CKD3b, former smoker, DM, HTN, GIANCARLO, acquired hypertrophic pyloric stenosis, esophageal dysphagia, GERD with esophageal stricture, hx ETOH abuse, and THC use. He presented to ED with c/o severe upper abdominal pain, radiating into back and chest.  Lipase 1123. UA neg. Abd US with hepatic steatosis. CT AP with acute pancreatitis with SMV thrombosis and acute intrapancreatic head fluid collection. GI consulted. On heparin gtt. We were consulted for [facility] recommendations for SMV thrombosis. Allergies   Allergen Reactions    Beta Vulgaris Rash     Past Medical History:   Diagnosis Date    Acquired hypertrophic pyloric stenosis     Adverse effect of anesthesia     wife states had delayed awaken    Anxiety     takes alprazolam daily    Arthritis     Benign neoplasm of colon     Bilateral carotid artery stenosis 2/30/2014    COVID-19 vaccine series completed 03/23/2021    Stalin Zaman; first dose given 2/23/2021    Dysphagia     Edema     BLE; takes spironolactone daily    GERD (gastroesophageal reflux disease)     managed w/med    Hypertension     Inguinal hernia     left per pt    PUD (peptic ulcer disease) 40 yrs ago    pt states no current issues    Severe obesity (BMI 35.0-39. 9) with comorbidity ([address]) 4/24/2018    Stage 3b chronic kidney
sucralfate (CARAFATE) 1 GM tablet Take 1 tablet by mouth 4 times daily    Automatic Reconciliation, Ar   torsemide (DEMADEX) 20 MG tablet Take 1 tablet by mouth 2 times daily    Automatic Reconciliation, Ar        heparin (porcine) injection 8,670 Units, PRN  heparin (porcine) injection 4,340 Units, PRN  heparin 25,000 units in dextrose 5% 250 mL (premix) infusion, Continuous  diatrizoate meglumine-sodium (GASTROGRAFIN) 66-10 % solution 15 mL, ONCE PRN  glucose chewable tablet 16 g, PRN  dextrose bolus 10% 125 mL, PRN   Or  dextrose bolus 10% 250 mL, PRN  glucagon (rDNA) injection 1 mg, PRN  dextrose 10 % infusion, Continuous PRN  insulin lispro (HUMALOG) injection vial 0-4 Units, TID WC  insulin lispro (HUMALOG) injection vial 0-4 Units, Nightly  ALPRAZolam (XANAX) tablet 1 mg, 4x Daily PRN  atorvastatin (LIPITOR) tablet 80 mg, Nightly  pantoprazole (PROTONIX) 40 mg in sodium chloride (PF) 0.9 % 10 mL injection, Q12H  metoprolol succinate (TOPROL XL) extended release tablet 200 mg, Daily  sucralfate (CARAFATE) tablet 1 g, 4x Daily  [Held by provider] torsemide (DEMADEX) tablet 20 mg, BID  lactated ringers IV soln infusion, Continuous  sodium chloride flush 0.9 % injection 5-40 mL, 2 times per day  sodium chloride flush 0.9 % injection 5-40 mL, PRN  0.9 % sodium chloride infusion, PRN  potassium chloride (KLOR-CON M) extended release tablet 40 mEq, PRN   Or  potassium bicarb-citric acid (EFFER-K) effervescent tablet 40 mEq, PRN   Or  potassium chloride 10 mEq/100 mL IVPB (Peripheral Line), PRN  magnesium sulfate 2000 mg in 50 mL IVPB premix, PRN  ondansetron (ZOFRAN-ODT) disintegrating tablet 4 mg, Q8H PRN   Or  ondansetron (ZOFRAN) injection 4 mg, Q6H PRN  oxyCODONE (ROXICODONE) immediate release tablet 5 mg, Q3H PRN  naloxone (NARCAN) injection 0.4 mg, PRN  lactulose (CHRONULAC) 10 GM/15ML solution 10 g, TID PRN  acetaminophen (TYLENOL) tablet 650 mg, TID  aluminum & magnesium hydroxide-simethicone (MAALOX)

## 2023-11-27 NOTE — CARE COORDINATION
Patient is scheduled to discharge on Wednesday. Therapy is not recommending any further needs. CM is following for discharge needs.

## 2023-11-28 VITALS
HEART RATE: 67 BPM | WEIGHT: 233.3 LBS | BODY MASS INDEX: 35.36 KG/M2 | DIASTOLIC BLOOD PRESSURE: 75 MMHG | TEMPERATURE: 97.9 F | HEIGHT: 68 IN | OXYGEN SATURATION: 98 % | RESPIRATION RATE: 18 BRPM | SYSTOLIC BLOOD PRESSURE: 170 MMHG

## 2023-11-28 DIAGNOSIS — K55.069 MESENTERIC VEIN THROMBOSIS (HCC): Primary | ICD-10-CM

## 2023-11-28 LAB
ERYTHROCYTE [DISTWIDTH] IN BLOOD BY AUTOMATED COUNT: 13.9 % (ref 11.9–14.6)
GLUCOSE BLD STRIP.AUTO-MCNC: 99 MG/DL (ref 65–100)
HCT VFR BLD AUTO: 36.2 % (ref 41.1–50.3)
HGB BLD-MCNC: 11.8 G/DL (ref 13.6–17.2)
MCH RBC QN AUTO: 30.5 PG (ref 26.1–32.9)
MCHC RBC AUTO-ENTMCNC: 32.6 G/DL (ref 31.4–35)
MCV RBC AUTO: 93.5 FL (ref 82–102)
NRBC # BLD: 0 K/UL (ref 0–0.2)
PLATELET # BLD AUTO: 264 K/UL (ref 150–450)
PMV BLD AUTO: 10.5 FL (ref 9.4–12.3)
RBC # BLD AUTO: 3.87 M/UL (ref 4.23–5.6)
SERVICE CMNT-IMP: NORMAL
UFH PPP CHRO-ACNC: 0.57 IU/ML (ref 0.3–0.7)
WBC # BLD AUTO: 7.2 K/UL (ref 4.3–11.1)

## 2023-11-28 PROCEDURE — 6360000002 HC RX W HCPCS: Performed by: INTERNAL MEDICINE

## 2023-11-28 PROCEDURE — 85520 HEPARIN ASSAY: CPT

## 2023-11-28 PROCEDURE — 85027 COMPLETE CBC AUTOMATED: CPT

## 2023-11-28 PROCEDURE — 36415 COLL VENOUS BLD VENIPUNCTURE: CPT

## 2023-11-28 PROCEDURE — 82962 GLUCOSE BLOOD TEST: CPT

## 2023-11-28 PROCEDURE — 6360000002 HC RX W HCPCS: Performed by: FAMILY MEDICINE

## 2023-11-28 PROCEDURE — 2580000003 HC RX 258: Performed by: FAMILY MEDICINE

## 2023-11-28 PROCEDURE — 2500000003 HC RX 250 WO HCPCS: Performed by: FAMILY MEDICINE

## 2023-11-28 PROCEDURE — C9113 INJ PANTOPRAZOLE SODIUM, VIA: HCPCS | Performed by: FAMILY MEDICINE

## 2023-11-28 PROCEDURE — A4216 STERILE WATER/SALINE, 10 ML: HCPCS | Performed by: FAMILY MEDICINE

## 2023-11-28 RX ORDER — TORSEMIDE 20 MG/1
20 TABLET ORAL DAILY
Qty: 30 TABLET | Refills: 0
Start: 2023-11-28

## 2023-11-28 RX ORDER — OMEPRAZOLE 40 MG/1
40 CAPSULE, DELAYED RELEASE ORAL 2 TIMES DAILY
Qty: 60 CAPSULE | Refills: 1 | Status: SHIPPED | OUTPATIENT
Start: 2023-11-28

## 2023-11-28 RX ORDER — OXYCODONE HYDROCHLORIDE 5 MG/1
5 TABLET ORAL EVERY 8 HOURS PRN
Qty: 9 TABLET | Refills: 0 | Status: SHIPPED | OUTPATIENT
Start: 2023-11-28 | End: 2023-12-01

## 2023-11-28 RX ADMIN — SODIUM CHLORIDE, POTASSIUM CHLORIDE, SODIUM LACTATE AND CALCIUM CHLORIDE: 600; 310; 30; 20 INJECTION, SOLUTION INTRAVENOUS at 04:23

## 2023-11-28 RX ADMIN — HEPARIN SODIUM 15 UNITS/KG/HR: 10000 INJECTION, SOLUTION INTRAVENOUS at 06:28

## 2023-11-28 RX ADMIN — PANTOPRAZOLE SODIUM 40 MG: 40 INJECTION, POWDER, FOR SOLUTION INTRAVENOUS at 06:31

## 2023-11-28 RX ADMIN — HYDROMORPHONE HYDROCHLORIDE 1 MG: 1 INJECTION, SOLUTION INTRAMUSCULAR; INTRAVENOUS; SUBCUTANEOUS at 01:13

## 2023-11-28 ASSESSMENT — PAIN SCALES - GENERAL
PAINLEVEL_OUTOF10: 9
PAINLEVEL_OUTOF10: 0

## 2023-11-28 NOTE — CARE COORDINATION
11/28/23 1019   Services At/After Discharge   Transition of Care Consult (CM Consult) [facility] Discharge None   [employer] Information Provided? No   Mode of Transport at Discharge Other (see comment)   Hospital Transport Time of Discharge 1022   Confirm Follow Up Transport Family   Condition of Participation: Discharge Planning   The Plan for Transition of Care is related to the following treatment goals: CM did not identify any discharge needs   The Patient and/or Patient Representative was provided with a Choice of Provider? Patient   The Patient and/Or Patient Representative agree with the Discharge Plan? Yes   Freedom of Choice list was provided with basic dialogue that supports the patient's individualized plan of care/goals, treatment preferences, and shares the quality data associated with the providers? Yes     Patient is discharging home with support. CM did not identify any discharge needs.

## 2023-11-29 ASSESSMENT — ENCOUNTER SYMPTOMS
SHORTNESS OF BREATH: 0
BLOOD IN STOOL: 0
ABDOMINAL DISTENTION: 0
COUGH: 0
ANAL BLEEDING: 0
ABDOMINAL PAIN: 1

## 2024-01-18 ENCOUNTER — CLINICAL DOCUMENTATION (OUTPATIENT)
Dept: ONCOLOGY | Age: 69
End: 2024-01-18

## 2024-08-27 ENCOUNTER — HOSPITAL ENCOUNTER (OUTPATIENT)
Dept: CT IMAGING | Age: 69
Discharge: HOME | End: 2024-08-30
Payer: MEDICARE

## 2024-08-27 DIAGNOSIS — K86.1 CHRONIC PANCREATITIS, UNSPECIFIED PANCREATITIS TYPE (HCC): ICD-10-CM

## 2024-08-27 LAB — CREAT BLD-MCNC: 2.47 MG/DL (ref 0.8–1.5)

## 2024-08-27 PROCEDURE — 6360000004 HC RX CONTRAST MEDICATION: Performed by: FAMILY MEDICINE

## 2024-08-27 PROCEDURE — 74150 CT ABDOMEN W/O CONTRAST: CPT | Performed by: RADIOLOGY

## 2024-08-27 PROCEDURE — 74150 CT ABDOMEN W/O CONTRAST: CPT

## 2024-08-27 PROCEDURE — 82565 ASSAY OF CREATININE: CPT

## 2024-08-27 RX ORDER — IOPAMIDOL 755 MG/ML
100 INJECTION, SOLUTION INTRAVASCULAR
Status: DISCONTINUED | OUTPATIENT
Start: 2024-08-27 | End: 2024-08-27

## 2024-08-27 RX ADMIN — DIATRIZOATE MEGLUMINE AND DIATRIZOATE SODIUM 15 ML: 660; 100 LIQUID ORAL; RECTAL at 10:22

## 2024-10-18 NOTE — Clinical Note
10/18/24 1329   Prechemo Checklist   Has the patient been in the hospital, ED, or urgent care since last date of service No   Chemo/Immuno Consent Completed and Signed Yes   Protocol/Indications Verified Yes   Confirmed to previous date/time of medication Yes   Compared to previous dose Yes   All medications are dated accurately Yes   Pregnancy Test Negative Not applicable   Parameters Met Yes   BSA/Weight-Height Verified Yes   Dose Calculations Verified (current, total, cumulative) Yes        Resume daily lasix in the morning Stay off toprol till advised by dr Lianne Lyn Return to the e.r.  If worse in any way

## 2024-11-04 ENCOUNTER — HOSPITAL ENCOUNTER (OUTPATIENT)
Dept: CT IMAGING | Age: 69
Discharge: HOME | End: 2024-11-07
Attending: INTERNAL MEDICINE
Payer: MEDICARE

## 2024-11-04 DIAGNOSIS — K86.1 OTHER CHRONIC PANCREATITIS (HCC): ICD-10-CM

## 2024-11-04 LAB — CREAT BLD-MCNC: 1.21 MG/DL (ref 0.8–1.5)

## 2024-11-04 PROCEDURE — 82565 ASSAY OF CREATININE: CPT

## 2024-11-04 PROCEDURE — 6360000004 HC RX CONTRAST MEDICATION: Performed by: INTERNAL MEDICINE

## 2024-11-04 PROCEDURE — 74160 CT ABDOMEN W/CONTRAST: CPT

## 2024-11-04 RX ORDER — IOPAMIDOL 755 MG/ML
100 INJECTION, SOLUTION INTRAVASCULAR
Status: COMPLETED | OUTPATIENT
Start: 2024-11-04 | End: 2024-11-04

## 2024-11-04 RX ADMIN — IOPAMIDOL 100 ML: 755 INJECTION, SOLUTION INTRAVENOUS at 08:45

## 2025-01-20 ENCOUNTER — TRANSCRIBE ORDERS (OUTPATIENT)
Dept: SCHEDULING | Age: 70
End: 2025-01-20

## 2025-01-20 DIAGNOSIS — K86.1 CHRONIC PANCREATITIS, UNSPECIFIED PANCREATITIS TYPE (HCC): ICD-10-CM

## 2025-01-20 DIAGNOSIS — R10.32 LLQ ABDOMINAL PAIN: ICD-10-CM

## 2025-01-20 DIAGNOSIS — R10.12 LUQ ABDOMINAL PAIN: Primary | ICD-10-CM

## 2025-01-30 ENCOUNTER — HOSPITAL ENCOUNTER (OUTPATIENT)
Dept: CT IMAGING | Age: 70
Discharge: HOME | End: 2025-01-30
Attending: INTERNAL MEDICINE
Payer: MEDICARE

## 2025-01-30 DIAGNOSIS — R10.32 LLQ ABDOMINAL PAIN: ICD-10-CM

## 2025-01-30 DIAGNOSIS — R10.12 LUQ ABDOMINAL PAIN: ICD-10-CM

## 2025-01-30 DIAGNOSIS — K86.1 CHRONIC PANCREATITIS, UNSPECIFIED PANCREATITIS TYPE (HCC): ICD-10-CM

## 2025-01-30 LAB — CREAT BLD-MCNC: 2.77 MG/DL (ref 0.8–1.5)

## 2025-01-30 PROCEDURE — 82565 ASSAY OF CREATININE: CPT

## 2025-01-30 PROCEDURE — 74176 CT ABD & PELVIS W/O CONTRAST: CPT

## 2025-01-30 RX ORDER — IOPAMIDOL 755 MG/ML
100 INJECTION, SOLUTION INTRAVASCULAR
Status: DISCONTINUED | OUTPATIENT
Start: 2025-01-30 | End: 2025-01-30

## 2025-01-30 RX ORDER — DIATRIZOATE MEGLUMINE AND DIATRIZOATE SODIUM 660; 100 MG/ML; MG/ML
15 SOLUTION ORAL; RECTAL
Status: ACTIVE | OUTPATIENT
Start: 2025-01-30

## 2025-03-17 ENCOUNTER — TRANSCRIBE ORDERS (OUTPATIENT)
Dept: SCHEDULING | Age: 70
End: 2025-03-17

## 2025-03-17 DIAGNOSIS — K86.1: Primary | ICD-10-CM

## 2025-03-27 ENCOUNTER — HOSPITAL ENCOUNTER (OUTPATIENT)
Dept: CT IMAGING | Age: 70
Discharge: HOME | End: 2025-03-29
Attending: INTERNAL MEDICINE
Payer: MEDICARE

## 2025-03-27 DIAGNOSIS — K86.1: ICD-10-CM

## 2025-03-27 LAB — CREAT BLD-MCNC: 1.33 MG/DL (ref 0.8–1.5)

## 2025-03-27 PROCEDURE — 82565 ASSAY OF CREATININE: CPT

## 2025-03-27 PROCEDURE — 6360000004 HC RX CONTRAST MEDICATION: Performed by: INTERNAL MEDICINE

## 2025-03-27 PROCEDURE — 74160 CT ABDOMEN W/CONTRAST: CPT

## 2025-03-27 RX ORDER — IOPAMIDOL 755 MG/ML
100 INJECTION, SOLUTION INTRAVASCULAR
Status: COMPLETED | OUTPATIENT
Start: 2025-03-27 | End: 2025-03-27

## 2025-03-27 RX ADMIN — IOPAMIDOL 100 ML: 755 INJECTION, SOLUTION INTRAVENOUS at 10:39

## 2025-05-14 ENCOUNTER — ANESTHESIA EVENT (OUTPATIENT)
Dept: SURGERY | Age: 70
End: 2025-05-14
Payer: MEDICARE

## 2025-05-14 ENCOUNTER — HOSPITAL ENCOUNTER (EMERGENCY)
Age: 70
Discharge: HOME | End: 2025-05-14
Payer: MEDICARE

## 2025-05-14 ENCOUNTER — ANESTHESIA (OUTPATIENT)
Dept: SURGERY | Age: 70
End: 2025-05-14
Payer: MEDICARE

## 2025-05-14 VITALS
WEIGHT: 190 LBS | RESPIRATION RATE: 16 BRPM | TEMPERATURE: 98 F | SYSTOLIC BLOOD PRESSURE: 169 MMHG | HEIGHT: 67 IN | OXYGEN SATURATION: 100 % | DIASTOLIC BLOOD PRESSURE: 74 MMHG | BODY MASS INDEX: 29.82 KG/M2 | HEART RATE: 64 BPM

## 2025-05-14 DIAGNOSIS — T18.128A ESOPHAGEAL OBSTRUCTION DUE TO FOOD IMPACTION: Primary | ICD-10-CM
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
## 2025-05-14 PROBLEM — T18.108A ESOPHAGEAL FOREIGN BODY, INITIAL ENCOUNTER: Status: ACTIVE | Noted: 2025-05-14

## 2025-05-14 PROCEDURE — 2720000010 HC SURG SUPPLY STERILE: Performed by: INTERNAL MEDICINE

## 2025-05-14 PROCEDURE — 7100000011 HC PHASE II RECOVERY - ADDTL 15 MIN: Performed by: INTERNAL MEDICINE

## 2025-05-14 PROCEDURE — 7100000001 HC PACU RECOVERY - ADDTL 15 MIN: Performed by: INTERNAL MEDICINE

## 2025-05-14 PROCEDURE — 2709999900 HC NON-CHARGEABLE SUPPLY: Performed by: INTERNAL MEDICINE

## 2025-05-14 PROCEDURE — 7100000000 HC PACU RECOVERY - FIRST 15 MIN: Performed by: INTERNAL MEDICINE

## 2025-05-14 PROCEDURE — 6360000002 HC RX W HCPCS: Performed by: REGISTERED NURSE

## 2025-05-14 PROCEDURE — 99283 EMERGENCY DEPT VISIT LOW MDM: CPT

## 2025-05-14 PROCEDURE — C1713 ANCHOR/SCREW BN/BN,TIS/BN: HCPCS | Performed by: INTERNAL MEDICINE

## 2025-05-14 PROCEDURE — 3700000001 HC ADD 15 MINUTES (ANESTHESIA): Performed by: INTERNAL MEDICINE

## 2025-05-14 PROCEDURE — 7100000010 HC PHASE II RECOVERY - FIRST 15 MIN: Performed by: INTERNAL MEDICINE

## 2025-05-14 PROCEDURE — 99223 1ST HOSP IP/OBS HIGH 75: CPT | Performed by: INTERNAL MEDICINE

## 2025-05-14 PROCEDURE — 3700000000 HC ANESTHESIA ATTENDED CARE: Performed by: INTERNAL MEDICINE

## 2025-05-14 PROCEDURE — 3609012900 HC EGD FOREIGN BODY REMOVAL: Performed by: INTERNAL MEDICINE

## 2025-05-14 PROCEDURE — 43247 EGD REMOVE FOREIGN BODY: CPT | Performed by: INTERNAL MEDICINE

## 2025-05-14 PROCEDURE — 2580000003 HC RX 258: Performed by: REGISTERED NURSE

## 2025-05-14 RX ORDER — SUCCINYLCHOLINE CHLORIDE 20 MG/ML
INJECTION INTRAMUSCULAR; INTRAVENOUS
Status: DISCONTINUED | OUTPATIENT
Start: 2025-05-14 | End: 2025-05-14 | Stop reason: SDUPTHER

## 2025-05-14 RX ORDER — IBUPROFEN 600 MG/1
1 TABLET ORAL ONCE
Status: DISCONTINUED | OUTPATIENT
Start: 2025-05-14 | End: 2025-05-14 | Stop reason: HOSPADM

## 2025-05-14 RX ORDER — PROPOFOL 10 MG/ML
INJECTION, EMULSION INTRAVENOUS
Status: DISCONTINUED | OUTPATIENT
Start: 2025-05-14 | End: 2025-05-14 | Stop reason: SDUPTHER

## 2025-05-14 RX ORDER — SODIUM CHLORIDE, SODIUM LACTATE, POTASSIUM CHLORIDE, CALCIUM CHLORIDE 600; 310; 30; 20 MG/100ML; MG/100ML; MG/100ML; MG/100ML
INJECTION, SOLUTION INTRAVENOUS
Status: DISCONTINUED | OUTPATIENT
Start: 2025-05-14 | End: 2025-05-14 | Stop reason: SDUPTHER

## 2025-05-14 RX ORDER — LIDOCAINE HYDROCHLORIDE 20 MG/ML
INJECTION, SOLUTION EPIDURAL; INFILTRATION; INTRACAUDAL; PERINEURAL
Status: DISCONTINUED | OUTPATIENT
Start: 2025-05-14 | End: 2025-05-14 | Stop reason: SDUPTHER

## 2025-05-14 RX ADMIN — PROPOFOL 150 MG: 10 INJECTION, EMULSION INTRAVENOUS at 16:41

## 2025-05-14 RX ADMIN — Medication 140 MG: at 16:41

## 2025-05-14 RX ADMIN — LIDOCAINE HYDROCHLORIDE 100 MG: 20 INJECTION, SOLUTION EPIDURAL; INFILTRATION; INTRACAUDAL; PERINEURAL at 16:41

## 2025-05-14 RX ADMIN — SODIUM CHLORIDE, SODIUM LACTATE, POTASSIUM CHLORIDE, AND CALCIUM CHLORIDE: 600; 310; 30; 20 INJECTION, SOLUTION INTRAVENOUS at 16:36

## 2025-05-14 ASSESSMENT — ENCOUNTER SYMPTOMS
BLOOD IN STOOL: 0
TROUBLE SWALLOWING: 1
ABDOMINAL PAIN: 0
SHORTNESS OF BREATH: 1
DIARRHEA: 0
NAUSEA: 0
COLOR CHANGE: 0
ABDOMINAL DISTENTION: 0
VOICE CHANGE: 0
SHORTNESS OF BREATH: 0
STRIDOR: 0
VOMITING: 0
WHEEZING: 0
CONSTIPATION: 0

## 2025-05-14 ASSESSMENT — PAIN - FUNCTIONAL ASSESSMENT
PAIN_FUNCTIONAL_ASSESSMENT: 0-10
PAIN_FUNCTIONAL_ASSESSMENT: 0-10

## 2025-05-14 ASSESSMENT — PAIN SCALES - GENERAL: PAINLEVEL_OUTOF10: 0

## 2025-05-14 NOTE — ED TRIAGE NOTES
Pt ambulatory unassisted to triage. Pt complains of food bolus x3 days. States unable to eat or drink x3 days. States hx of this x8-9 times previously. Pt speaking in clear sentences at this time. Appears to be tolerating secretions appropriately.

## 2025-05-14 NOTE — ANESTHESIA POSTPROCEDURE EVALUATION
Department of Anesthesiology  Postprocedure Note    Patient: Ermias Mendenhall  MRN: [id number]  YOB: 1955  Date of evaluation: 5/14/2025    Procedure Summary       Date: 05/14/25 Room / Location: [facility] OP OR 01 / [facility]    Anesthesia Start: 1636 Anesthesia Stop:     Procedure: ESOPHAGOGASTRODUODENOSCOPY FOREIGN BODY REMOVAL (Upper GI Region) Diagnosis:       Food impaction of esophagus, initial encounter      (Food impaction of esophagus, initial encounter [T18.128A, W44.F3XA])    Surgeons: Isaac Chairez DO Responsible Provider: Africa Gill MD    Anesthesia Type: General ASA Status: 3 - Emergent            Anesthesia Type: General    Tony Phase I: Tony Score: 10    Tony Phase II:      Anesthesia Post Evaluation    Patient location during evaluation: PACU  Patient participation: complete - patient participated  Level of consciousness: awake and alert  Pain scale: pain adequately controlled.  Airway patency: patent  Nausea & Vomiting: no nausea and no vomiting  Cardiovascular status: hemodynamically stable  Respiratory status: acceptable  Hydration status: euvolemic  Multimodal analgesia pain management approach  Pain management: adequate    No notable events documented.

## 2025-05-14 NOTE — ED PROVIDER NOTES
Emergency Department Provider Note       [facility] EMERGENCY DEPT   PCP: Taz Warren Jr., MD   Age: 69 y.o.   Sex: male     DISPOSITION      ICD-10-CM    1. Esophageal obstruction due to food impaction  T18.128A     W44.F3XA           Medical Decision Making   Patient is a 69-year-old male with past medical history of CKD stage III, alcohol use disorder, GERD presenting emergency department with esophageal food bolus.  Patient reports food bolus of chicken.  O2 sat 98 upon arrival.  No adventitious sounds heard with auscultation of the lungs.  No reproducible abdominal pain on physical exam.  Will attempt soda with jumping.  Patient unable to tolerate soda, vomited.  Will attempt IV glucagon.  Reached out to GI JOSE ALFREDO Malone for further recommendation.  Said that she will reach out to Dr. Diaz.  IV glucagon ordered.  Informed by RN, patient taken to GI suite by RN.  Patient off the unit at this time.  Per documentation from Dr. Diaz, patient taken to EGD.     1 or more acute illnesses that pose a threat to life or bodily function.   Over the counter drug management performed.  Shared medical decision making was utilized in creating the patients health plan today.  I independently ordered and reviewed each unique test.                         History     HPI  Patient is a 69-year-old male with a past medical history of CKD stage III, alcohol use disorder GERD presenting the emergency department with esophageal food bolus.  Patient states that he ate chicken 3 days ago, since then has been unable to tolerate oral intake.  He does note that he has a history of frequent esophageal obstructions, has had to have EGDs for removal prior.  He denies shortness of breath.  He denies the use of blood thinners.  He has been having to spit up oral secretions.  ROS     Review of Systems   HENT:  Positive for trouble swallowing. Negative for congestion, drooling and voice change.    Respiratory:  Negative for shortness of

## 2025-05-14 NOTE — ANESTHESIA PRE PROCEDURE
04:52 AM       POC Tests: No results for input(s): \"POCGLU\", \"POCNA\", \"POCK\", \"POCCL\", \"POCBUN\", \"POCHEMO\", \"POCHCT\" in the last 72 hours.    Coags:   Lab Results   Component Value Date/Time    PROTIME 13.9 11/26/2023 08:17 AM    INR 1.0 11/26/2023 08:17 AM    APTT 35.3 11/26/2023 08:17 AM       HCG (If Applicable): No results found for: \"PREGTESTUR\", \"PREGSERUM\", \"HCG\", \"HCGQUANT\"     ABGs: No results found for: \"PHART\", \"PO2ART\", \"HLC8MEJ\", \"RLU3UQF\", \"BEART\", \"H0UZEOFW\"     Type & Screen (If Applicable):  Lab Results   Component Value Date    ABORH O POSITIVE 10/14/2015    LABANTI NEG 10/14/2015       Drug/Infectious Status (If Applicable):  No results found for: \"HIV\", \"HEPCAB\"    COVID-19 Screening (If Applicable):   Lab Results   Component Value Date/Time    COVID19 Performed 10/07/2021 01:17 AM    COVID19 Not Detected 10/07/2021 01:17 AM           Anesthesia Evaluation  Patient summary reviewed and Nursing notes reviewed  Airway: Mallampati: II  TM distance: >3 FB   Neck ROM: full  Mouth opening: > = 3 FB   Dental:    (+) edentulous      Pulmonary:normal exam    (+)   shortness of breath:                                    Cardiovascular:  Exercise tolerance: good (>4 METS)  (+) hypertension:      ECG reviewed  Rhythm: regular  Rate: normal  Echocardiogram reviewed               ROS comment: LE edema, spironolactone    Echo nml EF, mild LVH     Neuro/Psych:   (+) CVA (slight right side weakness, speech problems):depression/anxiety             GI/Hepatic/Renal:   (+) hiatal hernia, GERD:, PUD, renal disease: CRI         ROS comment: Hx of esophageal stricture, Esophageal food bolusx 3 days, hx of recurrent esophageal food bolus/food impactions        BMI 36.5    Acquired hypertrophic pyloric stenosis  .   Endo/Other: Negative Endo/Other ROS                    Abdominal:             Vascular:          Other Findings:         Anesthesia Plan      ASA 3 - emergent       Induction: intravenous and rapid

## 2025-05-14 NOTE — CONSULTS
Assessment/Plan  Mr. Ermias Mendenhall is a 69 y.o. male presenting with with an esophageal food impaction.  He said chicken stuck in his esophagus for about 3 days now.  Plan EGD with food removal.  He says he does not take any anticoagulation but he is prescribed Eliquis so I may not be able to complete dilation today.      MEDs:     Current Facility-Administered Medications   Medication Dose Route Frequency Provider Last Rate Last Admin    Glucagon Emergency KIT 1 mg  1 mg IntraVENous Once Kerwin Luong PA-C         Current Outpatient Medications   Medication Sig Dispense Refill    apixaban (ELIQUIS) 5 MG TABS tablet Take 1 tablet by mouth 2 times daily 60 tablet 2    omeprazole (PRILOSEC) 40 MG delayed release capsule Take 1 capsule by mouth in the morning and at bedtime 60 capsule 1    torsemide (DEMADEX) 20 MG tablet Take 1 tablet by mouth daily 30 tablet 0    ALPRAZolam (XANAX) 2 MG tablet 1/2 to 1 qid prn anxiety      atorvastatin (LIPITOR) 80 MG tablet 1 every day for cholesterol      cetirizine (ZYRTEC) 10 MG tablet Take by mouth daily as needed      ergocalciferol (ERGOCALCIFEROL) 1.25 MG (80676 UT) capsule Take 1 capsule by mouth      fenofibrate (TRIGLIDE) 160 MG tablet Take 1 tablet by mouth daily      losartan (COZAAR) 100 MG tablet Take 1 tablet by mouth daily      magnesium oxide (MAG-OX) 400 (240 Mg) MG tablet Take 1 tablet by mouth daily      Metoprolol Succinate 50 MG CS24 Take by mouth daily      sucralfate (CARAFATE) 1 GM tablet Take 1 tablet by mouth 4 times daily         ALLERGIES:    Allergies   Allergen Reactions    Beta Vulgaris Rash       ROS:     Review of Systems   Constitutional:  Negative for activity change and fatigue.   HENT:  Positive for trouble swallowing.    Respiratory:  Negative for shortness of breath.    Cardiovascular:  Negative for chest pain.   Gastrointestinal:  Negative for abdominal distention, abdominal pain, blood in stool, constipation, diarrhea, nausea

## (undated) DEVICE — CUP MED 1OZ CLR POLYPR FEED GRAD W/O LID

## (undated) DEVICE — SUTURE VCRL + SZ 3-0 L18IN ABSRB UD SH 1/2 CIR TAPERCUT NDL VCP864D

## (undated) DEVICE — 3M™ TEGADERM™ TRANSPARENT FILM DRESSING FRAME STYLE, 1628, 6 IN X 8 IN (15 CM X 20 CM), 10/CT 8CT/CASE: Brand: 3M™ TEGADERM™

## (undated) DEVICE — CANNULA NSL ORAL AD FOR CAPNOFLEX CO2 O2 AIRLFE

## (undated) DEVICE — RETRIEVER STONE REM W4XL5.5CM SHTH DIA2.5MM UNIV SPR LIKE

## (undated) DEVICE — GOWN,PREVENTION PLUS,2XL,ST,22/CS: Brand: MEDLINE

## (undated) DEVICE — GLOVE ORTHO 8   MSG9480

## (undated) DEVICE — AMD ANTIMICROBIAL GAUZE SPONGES,12 PLY USP TYPE VII, 0.2% POLYHEXAMETHYLENE BIGUANIDE HCI (PHMB): Brand: CURITY

## (undated) DEVICE — TISSUE FACE KLNX 9X8IN --

## (undated) DEVICE — ABDOMINAL PAD: Brand: DERMACEA

## (undated) DEVICE — KENDALL RADIOLUCENT FOAM MONITORING ELECTRODE RECTANGULAR SHAPE: Brand: KENDALL

## (undated) DEVICE — DURASEAL® DURAL SEALANT SYSTEM 5ML 5 PACK: Brand: DURASEAL®

## (undated) DEVICE — CAESAR GRASPING FORCEPS: Brand: CAESAR

## (undated) DEVICE — WAX SURG 2.5GM HEMSTAT BNE BEESWAX PARAFFIN ISO PALMITATE

## (undated) DEVICE — AMD ANTIMICROBIAL NON-ADHERENT PAD,0.2% POLYHEXAMETHYLENE BIGUANIDE HCI (PHMB): Brand: TELFA

## (undated) DEVICE — 2000CC GUARDIAN II: Brand: GUARDIAN

## (undated) DEVICE — SOLUTION EFT TEST GI VISCOUS

## (undated) DEVICE — X-RAY SPONGES,12 PLY: Brand: DERMACEA

## (undated) DEVICE — RETRIEVAL DEVICE: Brand: RESCUENET™

## (undated) DEVICE — SUT ETHLN 3-0 18IN PS1 BLK --

## (undated) DEVICE — SOLUTION IV 1000ML 0.9% SOD CHL

## (undated) DEVICE — SYR 50ML SLIP TIP NSAF LF STRL --

## (undated) DEVICE — SUTURE VCRL VIO BR 0 18IN C/R M04 J701D

## (undated) DEVICE — BASIN EMESIS 500CC ROSE 250/CS 60/PLT: Brand: MEDEGEN MEDICAL PRODUCTS, LLC

## (undated) DEVICE — 3M™ TEGADERM™ TRANSPARENT FILM DRESSING FRAME STYLE, 1626W, 4 IN X 4-3/4 IN (10 CM X 12 CM), 50/CT 4CT/CASE: Brand: 3M™ TEGADERM™

## (undated) DEVICE — CONNECTOR TBNG OD5-7MM O2 END DISP

## (undated) DEVICE — 3M™ TRANSPORE™ WHITE SURGICAL TAPE 1534-1, 1 INCH X 10 YARD (2,5CM X 9,1M), 12 ROLLS/CARTON 10 CARTONS/CASE: Brand: 3M™ TRANSPORE™

## (undated) DEVICE — DRSG AQUACEL SURG 3.5X6IN -- CONVERT TO ITEM 369227

## (undated) DEVICE — (D)PREP SKN CHLRAPRP APPL 26ML -- CONVERT TO ITEM 371833

## (undated) DEVICE — BIPOLAR SEALER 23-113-1 AQM 2.3 OM NEURO: Brand: AQUAMANTYS ®

## (undated) DEVICE — SINGLE-USE BIOPSY FORCEPS: Brand: RADIAL JAW 4

## (undated) DEVICE — BLOCK BITE AD 60FR W/ VELC STRP ADDRESSES MOST PT AND

## (undated) DEVICE — DRAPE TWL SURG 16X26IN BLU ORB04] ALLCARE INC]

## (undated) DEVICE — DRAPE XR C ARM 41X74IN LF --

## (undated) DEVICE — NET RETRV FB ENDOSCP 2.5MMX230 -- ROTH NET

## (undated) DEVICE — KENDALL SCD EXPRESS SLEEVES, KNEE LENGTH, MEDIUM: Brand: KENDALL SCD

## (undated) DEVICE — FLOSEAL HEMOSTATIC MATRIX, 10 ML: Brand: FLOSEAL

## (undated) DEVICE — SKIN CLOS DERMABND PRINEO 60CM -- DERMABOUND PRINEO

## (undated) DEVICE — SYR 3ML LL TIP 1/10ML GRAD --

## (undated) DEVICE — PACK PROCEDURE SURG POST LAMINECTOMY CDS

## (undated) DEVICE — REM POLYHESIVE ADULT PATIENT RETURN ELECTRODE: Brand: VALLEYLAB

## (undated) DEVICE — INTENDED FOR TISSUE SEPARATION, AND OTHER PROCEDURES THAT REQUIRE A SHARP SURGICAL BLADE TO PUNCTURE OR CUT.: Brand: BARD-PARKER SAFETY BLADES SIZE 15, STERILE

## (undated) DEVICE — AGENT HEMSTAT W3XL4IN OXIDIZED REGENERATED CELOS ABSRB FOR

## (undated) DEVICE — FORCEPS ENDOSCP L230CM 2.4MM GRSP RATCH TOOTH RETRV

## (undated) DEVICE — SYRINGE CATH TIP 50ML

## (undated) DEVICE — BLADE ASSEMB CLP HAIR FINE --

## (undated) DEVICE — SYR 10ML LUER LOK 1/5ML GRAD --

## (undated) DEVICE — 1010 S-DRAPE TOWEL DRAPE 10/BX: Brand: STERI-DRAPE™

## (undated) DEVICE — STANDARD HYPODERMIC NEEDLE,POLYPROPYLENE HUB: Brand: MONOJECT